# Patient Record
Sex: FEMALE | Race: WHITE | NOT HISPANIC OR LATINO | URBAN - METROPOLITAN AREA
[De-identification: names, ages, dates, MRNs, and addresses within clinical notes are randomized per-mention and may not be internally consistent; named-entity substitution may affect disease eponyms.]

---

## 2022-10-28 ENCOUNTER — APPOINTMENT (OUTPATIENT)
Dept: RADIOLOGY | Facility: MEDICAL CENTER | Age: 42
DRG: 964 | End: 2022-10-28
Attending: EMERGENCY MEDICINE
Payer: COMMERCIAL

## 2022-10-28 ENCOUNTER — APPOINTMENT (OUTPATIENT)
Dept: RADIOLOGY | Facility: MEDICAL CENTER | Age: 42
DRG: 964 | End: 2022-10-28
Attending: SURGERY
Payer: COMMERCIAL

## 2022-10-28 ENCOUNTER — HOSPITAL ENCOUNTER (INPATIENT)
Facility: MEDICAL CENTER | Age: 42
LOS: 5 days | DRG: 964 | End: 2022-11-02
Attending: EMERGENCY MEDICINE | Admitting: SURGERY
Payer: COMMERCIAL

## 2022-10-28 DIAGNOSIS — S06.5XAA SDH (SUBDURAL HEMATOMA) (HCC): ICD-10-CM

## 2022-10-28 DIAGNOSIS — S73.005A DISLOCATION OF LEFT HIP, INITIAL ENCOUNTER (HCC): ICD-10-CM

## 2022-10-28 DIAGNOSIS — T14.90XA TRAUMA: ICD-10-CM

## 2022-10-28 DIAGNOSIS — S22.43XA CLOSED FRACTURE OF MULTIPLE RIBS OF BOTH SIDES, INITIAL ENCOUNTER: ICD-10-CM

## 2022-10-28 DIAGNOSIS — S82.832A OTHER CLOSED FRACTURE OF DISTAL END OF LEFT FIBULA, INITIAL ENCOUNTER: ICD-10-CM

## 2022-10-28 DIAGNOSIS — S32.000A LUMBAR COMPRESSION FRACTURE, CLOSED, INITIAL ENCOUNTER (HCC): ICD-10-CM

## 2022-10-28 DIAGNOSIS — S12.9XXA FRACTURE OF CERVICAL SPINOUS PROCESS, INITIAL ENCOUNTER (HCC): ICD-10-CM

## 2022-10-28 PROBLEM — S06.0XAA CONCUSSION WITH UNKNOWN LOSS OF CONSCIOUSNESS STATUS: Status: ACTIVE | Noted: 2022-10-28

## 2022-10-28 PROBLEM — Z53.09 CONTRAINDICATION TO DEEP VEIN THROMBOSIS (DVT) PROPHYLAXIS: Status: ACTIVE | Noted: 2022-10-28

## 2022-10-28 PROBLEM — R91.8 ABNORMAL FINDING ON LUNG IMAGING: Status: ACTIVE | Noted: 2022-10-28

## 2022-10-28 PROBLEM — S02.2XXA NASAL SEPTUM FRACTURE, CLOSED, INITIAL ENCOUNTER: Status: ACTIVE | Noted: 2022-10-28

## 2022-10-28 LAB
ABO + RH BLD: NORMAL
ABO GROUP BLD: NORMAL
ALBUMIN SERPL BCP-MCNC: 4 G/DL (ref 3.2–4.9)
ALBUMIN/GLOB SERPL: 1.9 G/DL
ALP SERPL-CCNC: 48 U/L (ref 30–99)
ALT SERPL-CCNC: 82 U/L (ref 2–50)
ANION GAP SERPL CALC-SCNC: 13 MMOL/L (ref 7–16)
APTT PPP: 23.5 SEC (ref 24.7–36)
AST SERPL-CCNC: 99 U/L (ref 12–45)
BILIRUB SERPL-MCNC: 0.2 MG/DL (ref 0.1–1.5)
BLD GP AB SCN SERPL QL: NORMAL
BUN SERPL-MCNC: 17 MG/DL (ref 8–22)
CALCIUM SERPL-MCNC: 8.6 MG/DL (ref 8.5–10.5)
CHLORIDE SERPL-SCNC: 102 MMOL/L (ref 96–112)
CO2 SERPL-SCNC: 20 MMOL/L (ref 20–33)
CREAT SERPL-MCNC: 0.7 MG/DL (ref 0.5–1.4)
ERYTHROCYTE [DISTWIDTH] IN BLOOD BY AUTOMATED COUNT: 42.3 FL (ref 35.9–50)
ETHANOL BLD-MCNC: <10.1 MG/DL
GFR SERPLBLD CREATININE-BSD FMLA CKD-EPI: 110 ML/MIN/1.73 M 2
GLOBULIN SER CALC-MCNC: 2.1 G/DL (ref 1.9–3.5)
GLUCOSE SERPL-MCNC: 193 MG/DL (ref 65–99)
HCG SERPL QL: NEGATIVE
HCT VFR BLD AUTO: 42.1 % (ref 37–47)
HGB BLD-MCNC: 14.2 G/DL (ref 12–16)
INR PPP: 1.1 (ref 0.87–1.13)
MCH RBC QN AUTO: 32.7 PG (ref 27–33)
MCHC RBC AUTO-ENTMCNC: 33.7 G/DL (ref 33.6–35)
MCV RBC AUTO: 97 FL (ref 81.4–97.8)
PLATELET # BLD AUTO: 183 K/UL (ref 164–446)
PMV BLD AUTO: 9.7 FL (ref 9–12.9)
POTASSIUM SERPL-SCNC: 3.5 MMOL/L (ref 3.6–5.5)
PROT SERPL-MCNC: 6.1 G/DL (ref 6–8.2)
PROTHROMBIN TIME: 14.1 SEC (ref 12–14.6)
RBC # BLD AUTO: 4.34 M/UL (ref 4.2–5.4)
RH BLD: NORMAL
SODIUM SERPL-SCNC: 135 MMOL/L (ref 135–145)
WBC # BLD AUTO: 19.2 K/UL (ref 4.8–10.8)

## 2022-10-28 PROCEDURE — 90471 IMMUNIZATION ADMIN: CPT

## 2022-10-28 PROCEDURE — 85730 THROMBOPLASTIN TIME PARTIAL: CPT

## 2022-10-28 PROCEDURE — 770022 HCHG ROOM/CARE - ICU (200)

## 2022-10-28 PROCEDURE — 700102 HCHG RX REV CODE 250 W/ 637 OVERRIDE(OP): Performed by: SURGERY

## 2022-10-28 PROCEDURE — 86901 BLOOD TYPING SEROLOGIC RH(D): CPT

## 2022-10-28 PROCEDURE — 0SSBXZZ REPOSITION LEFT HIP JOINT, EXTERNAL APPROACH: ICD-10-PCS | Performed by: EMERGENCY MEDICINE

## 2022-10-28 PROCEDURE — 700105 HCHG RX REV CODE 258: Performed by: SURGERY

## 2022-10-28 PROCEDURE — 86900 BLOOD TYPING SEROLOGIC ABO: CPT

## 2022-10-28 PROCEDURE — 82077 ASSAY SPEC XCP UR&BREATH IA: CPT

## 2022-10-28 PROCEDURE — 72128 CT CHEST SPINE W/O DYE: CPT

## 2022-10-28 PROCEDURE — 700117 HCHG RX CONTRAST REV CODE 255: Performed by: EMERGENCY MEDICINE

## 2022-10-28 PROCEDURE — 96374 THER/PROPH/DIAG INJ IV PUSH: CPT

## 2022-10-28 PROCEDURE — 80053 COMPREHEN METABOLIC PANEL: CPT

## 2022-10-28 PROCEDURE — 72131 CT LUMBAR SPINE W/O DYE: CPT

## 2022-10-28 PROCEDURE — 51798 US URINE CAPACITY MEASURE: CPT

## 2022-10-28 PROCEDURE — 36415 COLL VENOUS BLD VENIPUNCTURE: CPT

## 2022-10-28 PROCEDURE — 70450 CT HEAD/BRAIN W/O DYE: CPT

## 2022-10-28 PROCEDURE — 700105 HCHG RX REV CODE 258: Performed by: EMERGENCY MEDICINE

## 2022-10-28 PROCEDURE — 3E0234Z INTRODUCTION OF SERUM, TOXOID AND VACCINE INTO MUSCLE, PERCUTANEOUS APPROACH: ICD-10-PCS | Performed by: EMERGENCY MEDICINE

## 2022-10-28 PROCEDURE — 700101 HCHG RX REV CODE 250: Performed by: SURGERY

## 2022-10-28 PROCEDURE — 73501 X-RAY EXAM HIP UNI 1 VIEW: CPT | Mod: LT

## 2022-10-28 PROCEDURE — 71045 X-RAY EXAM CHEST 1 VIEW: CPT

## 2022-10-28 PROCEDURE — 85610 PROTHROMBIN TIME: CPT

## 2022-10-28 PROCEDURE — 72170 X-RAY EXAM OF PELVIS: CPT

## 2022-10-28 PROCEDURE — 84703 CHORIONIC GONADOTROPIN ASSAY: CPT

## 2022-10-28 PROCEDURE — 96375 TX/PRO/DX INJ NEW DRUG ADDON: CPT

## 2022-10-28 PROCEDURE — 99233 SBSQ HOSP IP/OBS HIGH 50: CPT | Performed by: SURGERY

## 2022-10-28 PROCEDURE — 90715 TDAP VACCINE 7 YRS/> IM: CPT | Performed by: EMERGENCY MEDICINE

## 2022-10-28 PROCEDURE — 72125 CT NECK SPINE W/O DYE: CPT

## 2022-10-28 PROCEDURE — 700111 HCHG RX REV CODE 636 W/ 250 OVERRIDE (IP): Performed by: EMERGENCY MEDICINE

## 2022-10-28 PROCEDURE — A9270 NON-COVERED ITEM OR SERVICE: HCPCS | Performed by: SURGERY

## 2022-10-28 PROCEDURE — G0390 TRAUMA RESPONS W/HOSP CRITI: HCPCS

## 2022-10-28 PROCEDURE — 73030 X-RAY EXAM OF SHOULDER: CPT | Mod: RT

## 2022-10-28 PROCEDURE — 73610 X-RAY EXAM OF ANKLE: CPT | Mod: LT

## 2022-10-28 PROCEDURE — 99291 CRITICAL CARE FIRST HOUR: CPT

## 2022-10-28 PROCEDURE — 86850 RBC ANTIBODY SCREEN: CPT

## 2022-10-28 PROCEDURE — 71260 CT THORAX DX C+: CPT

## 2022-10-28 PROCEDURE — 85027 COMPLETE CBC AUTOMATED: CPT

## 2022-10-28 PROCEDURE — 27250 TREAT HIP DISLOCATION: CPT

## 2022-10-28 PROCEDURE — 99223 1ST HOSP IP/OBS HIGH 75: CPT | Performed by: SURGERY

## 2022-10-28 RX ORDER — ONDANSETRON 4 MG/1
4 TABLET, ORALLY DISINTEGRATING ORAL EVERY 4 HOURS PRN
Status: DISCONTINUED | OUTPATIENT
Start: 2022-10-28 | End: 2022-11-02 | Stop reason: HOSPADM

## 2022-10-28 RX ORDER — SODIUM CHLORIDE, SODIUM LACTATE, POTASSIUM CHLORIDE, CALCIUM CHLORIDE 600; 310; 30; 20 MG/100ML; MG/100ML; MG/100ML; MG/100ML
INJECTION, SOLUTION INTRAVENOUS CONTINUOUS
Status: DISCONTINUED | OUTPATIENT
Start: 2022-10-28 | End: 2022-10-30

## 2022-10-28 RX ORDER — DOCUSATE SODIUM 100 MG/1
100 CAPSULE, LIQUID FILLED ORAL 2 TIMES DAILY
Status: DISCONTINUED | OUTPATIENT
Start: 2022-10-28 | End: 2022-11-02 | Stop reason: HOSPADM

## 2022-10-28 RX ORDER — OXYCODONE HYDROCHLORIDE 10 MG/1
10 TABLET ORAL
Status: DISCONTINUED | OUTPATIENT
Start: 2022-10-28 | End: 2022-11-02 | Stop reason: HOSPADM

## 2022-10-28 RX ORDER — AMOXICILLIN 250 MG
1 CAPSULE ORAL NIGHTLY
Status: DISCONTINUED | OUTPATIENT
Start: 2022-10-28 | End: 2022-11-02 | Stop reason: HOSPADM

## 2022-10-28 RX ORDER — OXYCODONE HYDROCHLORIDE 5 MG/1
5 TABLET ORAL
Status: DISCONTINUED | OUTPATIENT
Start: 2022-10-28 | End: 2022-11-02 | Stop reason: HOSPADM

## 2022-10-28 RX ORDER — POLYETHYLENE GLYCOL 3350 17 G/17G
1 POWDER, FOR SOLUTION ORAL 2 TIMES DAILY
Status: DISCONTINUED | OUTPATIENT
Start: 2022-10-28 | End: 2022-11-02 | Stop reason: HOSPADM

## 2022-10-28 RX ORDER — LIDOCAINE 50 MG/G
1 PATCH TOPICAL EVERY 24 HOURS
Status: DISCONTINUED | OUTPATIENT
Start: 2022-10-28 | End: 2022-10-30

## 2022-10-28 RX ORDER — METAXALONE 800 MG/1
800 TABLET ORAL 3 TIMES DAILY
Status: DISCONTINUED | OUTPATIENT
Start: 2022-10-28 | End: 2022-11-02 | Stop reason: HOSPADM

## 2022-10-28 RX ORDER — ONDANSETRON 2 MG/ML
4 INJECTION INTRAMUSCULAR; INTRAVENOUS ONCE
Status: COMPLETED | OUTPATIENT
Start: 2022-10-28 | End: 2022-10-28

## 2022-10-28 RX ORDER — ACETAMINOPHEN 325 MG/1
650 TABLET ORAL 4 TIMES DAILY
Status: DISCONTINUED | OUTPATIENT
Start: 2022-10-28 | End: 2022-11-02 | Stop reason: HOSPADM

## 2022-10-28 RX ORDER — MODAFINIL 200 MG/1
200 TABLET ORAL
Status: ON HOLD | COMMUNITY
End: 2022-11-02

## 2022-10-28 RX ORDER — AMOXICILLIN 250 MG
1 CAPSULE ORAL
Status: DISCONTINUED | OUTPATIENT
Start: 2022-10-28 | End: 2022-11-02 | Stop reason: HOSPADM

## 2022-10-28 RX ORDER — BISACODYL 10 MG
10 SUPPOSITORY, RECTAL RECTAL
Status: DISCONTINUED | OUTPATIENT
Start: 2022-10-28 | End: 2022-11-02 | Stop reason: HOSPADM

## 2022-10-28 RX ORDER — GABAPENTIN 100 MG/1
100 CAPSULE ORAL EVERY 8 HOURS
Status: DISCONTINUED | OUTPATIENT
Start: 2022-10-28 | End: 2022-10-30

## 2022-10-28 RX ORDER — SODIUM CHLORIDE 9 MG/ML
1000 INJECTION, SOLUTION INTRAVENOUS ONCE
Status: COMPLETED | OUTPATIENT
Start: 2022-10-28 | End: 2022-10-28

## 2022-10-28 RX ORDER — NAPROXEN SODIUM 220 MG
440 TABLET ORAL
Status: ON HOLD | COMMUNITY
End: 2022-11-02

## 2022-10-28 RX ORDER — ENEMA 19; 7 G/133ML; G/133ML
1 ENEMA RECTAL
Status: DISCONTINUED | OUTPATIENT
Start: 2022-10-28 | End: 2022-11-02 | Stop reason: HOSPADM

## 2022-10-28 RX ORDER — MORPHINE SULFATE 4 MG/ML
4 INJECTION INTRAVENOUS
Status: DISCONTINUED | OUTPATIENT
Start: 2022-10-28 | End: 2022-10-28

## 2022-10-28 RX ORDER — HYDROMORPHONE HYDROCHLORIDE 1 MG/ML
0.5 INJECTION, SOLUTION INTRAMUSCULAR; INTRAVENOUS; SUBCUTANEOUS
Status: DISCONTINUED | OUTPATIENT
Start: 2022-10-28 | End: 2022-10-30

## 2022-10-28 RX ORDER — ENOXAPARIN SODIUM 100 MG/ML
30 INJECTION SUBCUTANEOUS EVERY 12 HOURS
Status: DISCONTINUED | OUTPATIENT
Start: 2022-10-29 | End: 2022-11-02 | Stop reason: HOSPADM

## 2022-10-28 RX ORDER — ONDANSETRON 2 MG/ML
4 INJECTION INTRAMUSCULAR; INTRAVENOUS EVERY 4 HOURS PRN
Status: DISCONTINUED | OUTPATIENT
Start: 2022-10-28 | End: 2022-10-30

## 2022-10-28 RX ADMIN — CLOSTRIDIUM TETANI TOXOID ANTIGEN (FORMALDEHYDE INACTIVATED), CORYNEBACTERIUM DIPHTHERIAE TOXOID ANTIGEN (FORMALDEHYDE INACTIVATED), BORDETELLA PERTUSSIS TOXOID ANTIGEN (GLUTARALDEHYDE INACTIVATED), BORDETELLA PERTUSSIS FILAMENTOUS HEMAGGLUTININ ANTIGEN (FORMALDEHYDE INACTIVATED), BORDETELLA PERTUSSIS PERTACTIN ANTIGEN, AND BORDETELLA PERTUSSIS FIMBRIAE 2/3 ANTIGEN 0.5 ML: 5; 2; 2.5; 5; 3; 5 INJECTION, SUSPENSION INTRAMUSCULAR at 06:28

## 2022-10-28 RX ADMIN — METAXALONE 800 MG: 800 TABLET ORAL at 17:04

## 2022-10-28 RX ADMIN — ONDANSETRON 4 MG: 2 INJECTION INTRAMUSCULAR; INTRAVENOUS at 05:45

## 2022-10-28 RX ADMIN — OXYCODONE HYDROCHLORIDE 10 MG: 10 TABLET ORAL at 23:57

## 2022-10-28 RX ADMIN — OXYCODONE 5 MG: 5 TABLET ORAL at 10:15

## 2022-10-28 RX ADMIN — ACETAMINOPHEN 650 MG: 325 TABLET, FILM COATED ORAL at 21:04

## 2022-10-28 RX ADMIN — SENNOSIDES AND DOCUSATE SODIUM 1 TABLET: 50; 8.6 TABLET ORAL at 21:04

## 2022-10-28 RX ADMIN — OXYCODONE HYDROCHLORIDE 10 MG: 10 TABLET ORAL at 15:26

## 2022-10-28 RX ADMIN — ACETAMINOPHEN 650 MG: 325 TABLET, FILM COATED ORAL at 12:36

## 2022-10-28 RX ADMIN — PROPOFOL 50 MG: 10 INJECTION, EMULSION INTRAVENOUS at 05:00

## 2022-10-28 RX ADMIN — SODIUM CHLORIDE 1000 ML: 9 INJECTION, SOLUTION INTRAVENOUS at 06:00

## 2022-10-28 RX ADMIN — IOHEXOL 100 ML: 350 INJECTION, SOLUTION INTRAVENOUS at 05:45

## 2022-10-28 RX ADMIN — GABAPENTIN 100 MG: 100 CAPSULE ORAL at 08:38

## 2022-10-28 RX ADMIN — GABAPENTIN 100 MG: 100 CAPSULE ORAL at 13:36

## 2022-10-28 RX ADMIN — SODIUM CHLORIDE, POTASSIUM CHLORIDE, SODIUM LACTATE AND CALCIUM CHLORIDE: 600; 310; 30; 20 INJECTION, SOLUTION INTRAVENOUS at 18:58

## 2022-10-28 RX ADMIN — MORPHINE SULFATE 4 MG: 4 INJECTION, SOLUTION INTRAMUSCULAR; INTRAVENOUS at 05:32

## 2022-10-28 RX ADMIN — METAXALONE 800 MG: 800 TABLET ORAL at 08:38

## 2022-10-28 RX ADMIN — METAXALONE 800 MG: 800 TABLET ORAL at 12:36

## 2022-10-28 RX ADMIN — ACETAMINOPHEN 650 MG: 325 TABLET, FILM COATED ORAL at 08:38

## 2022-10-28 RX ADMIN — LIDOCAINE 1 PATCH: 50 PATCH TOPICAL at 08:38

## 2022-10-28 RX ADMIN — ACETAMINOPHEN 650 MG: 325 TABLET, FILM COATED ORAL at 17:04

## 2022-10-28 RX ADMIN — SODIUM CHLORIDE, POTASSIUM CHLORIDE, SODIUM LACTATE AND CALCIUM CHLORIDE: 600; 310; 30; 20 INJECTION, SOLUTION INTRAVENOUS at 08:40

## 2022-10-28 RX ADMIN — GABAPENTIN 100 MG: 100 CAPSULE ORAL at 21:04

## 2022-10-28 ASSESSMENT — COGNITIVE AND FUNCTIONAL STATUS - GENERAL
DRESSING REGULAR LOWER BODY CLOTHING: A LITTLE
PERSONAL GROOMING: A LITTLE
EATING MEALS: A LITTLE
CLIMB 3 TO 5 STEPS WITH RAILING: A LITTLE
SUGGESTED CMS G CODE MODIFIER DAILY ACTIVITY: CK
SUGGESTED CMS G CODE MODIFIER MOBILITY: CK
MOVING FROM LYING ON BACK TO SITTING ON SIDE OF FLAT BED: A LITTLE
WALKING IN HOSPITAL ROOM: A LITTLE
MOVING TO AND FROM BED TO CHAIR: A LITTLE
STANDING UP FROM CHAIR USING ARMS: A LITTLE
TURNING FROM BACK TO SIDE WHILE IN FLAT BAD: A LITTLE
DRESSING REGULAR UPPER BODY CLOTHING: A LITTLE
MOBILITY SCORE: 18
HELP NEEDED FOR BATHING: A LITTLE
TOILETING: A LITTLE
DAILY ACTIVITIY SCORE: 18

## 2022-10-28 ASSESSMENT — LIFESTYLE VARIABLES
AVERAGE NUMBER OF DAYS PER WEEK YOU HAVE A DRINK CONTAINING ALCOHOL: 1
EVER HAD A DRINK FIRST THING IN THE MORNING TO STEADY YOUR NERVES TO GET RID OF A HANGOVER: NO
HAVE PEOPLE ANNOYED YOU BY CRITICIZING YOUR DRINKING: NO
DOES PATIENT WANT TO STOP DRINKING: NO
HAVE YOU EVER FELT YOU SHOULD CUT DOWN ON YOUR DRINKING: NO
CONSUMPTION TOTAL: NEGATIVE
ON A TYPICAL DAY WHEN YOU DRINK ALCOHOL HOW MANY DRINKS DO YOU HAVE: 1
HOW MANY TIMES IN THE PAST YEAR HAVE YOU HAD 5 OR MORE DRINKS IN A DAY: 0
TOTAL SCORE: 0
ALCOHOL_USE: YES
TOTAL SCORE: 0
TOTAL SCORE: 0
EVER FELT BAD OR GUILTY ABOUT YOUR DRINKING: NO

## 2022-10-28 ASSESSMENT — ENCOUNTER SYMPTOMS
BACK PAIN: 0
PALPITATIONS: 0
NECK PAIN: 1
MYALGIAS: 1
ABDOMINAL PAIN: 0
SPEECH CHANGE: 0
DIZZINESS: 0
TINGLING: 0
FEVER: 0
CHILLS: 0
SHORTNESS OF BREATH: 0
COUGH: 0
BLURRED VISION: 0
DOUBLE VISION: 0
SENSORY CHANGE: 0
HEADACHES: 0
JOINT SWELLING: 1
TREMORS: 0
NAUSEA: 0
VOMITING: 0

## 2022-10-28 ASSESSMENT — PAIN DESCRIPTION - PAIN TYPE
TYPE: ACUTE PAIN

## 2022-10-28 ASSESSMENT — PATIENT HEALTH QUESTIONNAIRE - PHQ9
2. FEELING DOWN, DEPRESSED, IRRITABLE, OR HOPELESS: NOT AT ALL
SUM OF ALL RESPONSES TO PHQ9 QUESTIONS 1 AND 2: 0
1. LITTLE INTEREST OR PLEASURE IN DOING THINGS: NOT AT ALL

## 2022-10-28 NOTE — ED NOTES
Pt transported to [facility] from CT, Pt placed on cardiac monitor, continuous pulse ox and BP. Call light in reach, side rails up, bed locked and in lowest position, multiple warm blanket provided. C/o chills, answering questions appropriately, c/o pain to R shoulder, lower back, R hip.

## 2022-10-28 NOTE — ASSESSMENT & PLAN NOTE
3.3 mm left posterior subdural hematoma.  mBIG1.  Repeat interval CT imaging brain demonstrated no significant change or progression.  Non-operative management.  Post traumatic pharmacologic seizure prophylaxis for 1 week.  10/31 Speech Language Pathology cognitive evaluation completed, no further needs.  Josias Schreiber MD. Neurosurgeon. [facility].
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
## 2022-10-28 NOTE — ASSESSMENT & PLAN NOTE
Left L4 superior facet fracture. Fracture through superolateral L4 superior endplate. Right T12, L1, L2, and L3 transverse process fractures.  10/29 MR completed.  Non-operative management.  Mobilize as tolerated with bracing. LSO, may don/doff at edge of bed.  Josias Schreiber MD. Neurosurgeon. [facility].
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
## 2022-10-28 NOTE — PROGRESS NOTES
ORTHO RESPONSE TIME:  I was contacted by Dr. Castillo at 6:08am requesting a formal orthopaedic consultation.  I responded to the consultation request at 6:10am.  I physically evaluated the patient at 9:10am.    Ministerio Watts M.D.

## 2022-10-28 NOTE — LETTER
"  FORM C-4:  EMPLOYEE’S CLAIM FOR COMPENSATION/ REPORT OF INITIAL TREATMENT  EMPLOYEE’S CLAIM - PROVIDE ALL INFORMATION REQUESTED   First Name Nerissa Last Name Sonny Birthdate 1980  Sex female Claim Number   Home Address [address]             [place] [address]                                   Age  42 y.o. Height  1.676 m (5' 6\") Weight  72.5 kg (159 lb 13.3 oz) [facility]     Mailing Address [address]              Zip [address] Telephone  [phone] (home)  Primary Language Spoken  ENGLISH   Insurer   Third Party   [employer] Employee's Occupation (Job Title) When Injury or Occupational Disease Occurred     Employer's Name [employer] Telephone [phone]    Employer Address [address]] Zip [address]   Date of Injury  10/27/2022       Hour of Injury  11:00 PM Date Employer Notified  10/27/2022 Last Day of Work after Injury or Occupational Disease  10/27/2022 Supervisor to Whom Injury Reported  Edson Gutierrez   Address or Location of Accident (if applicable) Work [1]   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 Be specific and answer in detail. Use additional sheet if necessary)  Rock fell on us   If you believe that you have an occupational disease, when did you first have knowledge of the disability and it relationship to your employment? N/A Witnesses to the Accident  John   Nature of Injury or Occupational Disease  Workers' Compensation Part(s) of Body Injured or Affected  Soft Tissue, N/A, N/A    I CERTIFY THAT THE ABOVE IS TRUE AND CORRECT TO THE BEST OF MY KNOWLEDGE AND THAT I HAVE PROVIDED THIS INFORMATION IN ORDER TO OBTAIN THE BENEFITS OF [place]  I HEREBY AUTHORIZE ANY PHYSICIAN, " CHIROPRACTOR, SURGEON, PRACTITIONER, OR OTHER PERSON, ANY HOSPITAL, INCLUDING [facility] OR [facility], ANY MEDICAL SERVICE ORGANIZATION, ANY INSURANCE COMPANY, OR OTHER INSTITUTION OR ORGANIZATION TO RELEASE TO EACH OTHER, ANY MEDICAL OR OTHER INFORMATION, INCLUDING BENEFITS PAID OR PAYABLE, PERTINENT TO THIS INJURY OR DISEASE, EXCEPT INFORMATION RELATIVE TO DIAGNOSIS, TREATMENT AND/OR COUNSELING FOR AIDS, PSYCHOLOGICAL CONDITIONS, ALCOHOL OR CONTROLLED SUBSTANCES, FOR WHICH I MUST GIVE SPECIFIC AUTHORIZATION.  A PHOTOSTAT OF THIS AUTHORIZATION SHALL BE AS VALID AS THE ORIGINAL.  Date                                      Place   [facility]                                    Employee’s Signature   THIS REPORT MUST BE COMPLETED AND MAILED WITHIN 3 WORKING DAYS OF TREATMENT   Place ORTHO/SPINE                       Name of Facility [facility]   Date  10/28/2022 Diagnosis  (S06.5XAA) SDH (subdural hematoma)  (S73.005A) Dislocation of left hip, initial encounter ([facility])  (S22.43XA) Closed fracture of multiple ribs of both sides, initial encounter  (S12.9XXA) Fracture of cervical spinous process, initial encounter ([facility])  (S32.000A) Lumbar compression fracture, closed, initial encounter ([facility])  (S82.832A) Other closed fracture of distal end of left fibula, initial encounter Is there evidence the injured employee was under the influence of alcohol and/or another controlled substance at the time of accident?   Hour  4:54 PM Description of Injury or Disease  SDH (subdural hematoma)  Dislocation of left hip, initial encounter ([facility])  Closed fracture of multiple ribs of both sides, initial encounter  Fracture of cervical spinous process, initial encounter ([facility])  Lumbar compression fracture, closed, initial encounter ([facility])  Other closed fracture of distal end of left fibula, initial encounter No   Treatment  Hip dislocation reduction, admission to the hospital  Have you advised the patient to  "remain off work five days or more?         No   X-Ray Findings  Positive If Yes   From Date    To Date      From information given by the employee, together with medical evidence, can you directly connect this injury or occupational disease as job incurred? Yes If No, is employee capable of: Full Duty  No Modified Duty  Yes   Is additional medical care by a physician indicated? Yes If Modified Duty, Specify any Limitations / Restrictions   Per further recommendations of admitting team   Do you know of any previous injury or disease contributing to this condition or occupational disease? No    Date 10/31/2022 Print Doctor’s Name Bob Castillo certify the employer’s copy of this form was mailed on:   Address [address] [place] [id number] [phone] INSURER’S USE ONLY   Provider’s Tax ID Number [id number] Telephone Dept: [phone]    Doctor’s Signature e-SignLAUREL CAMRAGO M.D. Degree     M.D.      Form C-4 (rev.10/07)                                                                         BRIEF DESCRIPTION OF RIGHTS AND BENEFITS  (Pursuant to [place] 616C.050)    Notice of Injury or Occupational Disease (Incident Report Form C-1): If an injury or occupational disease (OD) arises out of and in the course of employment, you must provide written notice to your employer as soon as practicable, but no later than 7 days after the accident or OD. Your employer shall maintain a sufficient supply of the required forms.    Claim for Compensation (Form C-4): If medical treatment is sought, the form C-4 is available at the place of initial treatment. A completed \"Claim for Compensation\" (Form C-4) must be filed within 90 days after an accident or OD. The treating physician or chiropractor must, within 3 working days after treatment, complete and mail to the employer, the employer's insurer and third-party , the Claim for Compensation.    Medical Treatment: If you require medical treatment for your " on-the-job injury or OD, you may be required to select a physician or chiropractor from a list provided by your workers’ compensation insurer, if it has contracted with an Organization for Managed Care (MCO) or Preferred Provider Organization (PPO) or providers of health care. If your employer has not entered into a contract with an MCO or PPO, you may select a physician or chiropractor from the Panel of Physicians and Chiropractors. Any medical costs related to your industrial injury or OD will be paid by your insurer.    Temporary Total Disability (TTD): If your doctor has certified that you are unable to work for a period of at least 5 consecutive days, or 5 cumulative days in a 20-day period, or places restrictions on you that your employer does not accommodate, you may be entitled to TTD compensation.    Temporary Partial Disability (TPD): If the wage you receive upon reemployment is less than the compensation for TTD to which you are entitled, the insurer may be required to pay you TPD compensation to make up the difference. TPD can only be paid for a maximum of 24 months.    Permanent Partial Disability (PPD): When your medical condition is stable and there is an indication of a PPD as a result of your injury or OD, within 30 days, your insurer must arrange for an evaluation by a rating physician or chiropractor to determine the degree of your PPD. The amount of your PPD award depends on the date of injury, the results of the PPD evaluation, your age and wage.    Permanent Total Disability (PTD): If you are medically certified by a treating physician or chiropractor as permanently and totally disabled and have been granted a PTD status by your insurer, you are entitled to receive monthly benefits not to exceed 66 2/3% of your average monthly wage. The amount of your PTD payments is subject to reduction if you previously received a lump-sum PPD award.    Vocational Rehabilitation Services: You may be eligible  for vocational rehabilitation services if you are unable to return to the job due to a permanent physical impairment or permanent restrictions as a result of your injury or occupational disease.    Transportation and Per Michaela Reimbursement: You may be eligible for travel expenses and per michaela associated with medical treatment.    Reopening: You may be able to reopen your claim if your condition worsens after claim closure.     Appeal Process: If you disagree with a written determination issued by the insurer or the insurer does not respond to your request, you may appeal to the Department of Administration, , by following the instructions contained in your determination letter. You must appeal the determination within 70 days from the date of the determination letter at [address], [address], [place], [place] [address], or [address], [place], [place] [address]. If you disagree with the  decision, you may appeal to the Department of Administration, . You must file your appeal within 30 days from the date of the  decision letter at [address], [address], [place], [place] [address], or [address], [address], [place], [place] [address]. If you disagree with a decision of an , you may file a petition for judicial review with the District Court. You must do so within 30 days of the Appeal Officer’s decision. You may be represented by an  at your own expense or you may contact the [facility] for possible representation.    [place]  for Injured Workers (NAIW): If you disagree with a  decision, you may request that NAIW represent you without charge at an  Hearing. For information regarding denial of benefits, you may contact the [facility] at: [address], [address], [place] [address], [phone], or [address], [address], [place] [address],  [phone]    To File a Complaint with the Division: If you wish to file a complaint with the  of the Division of Industrial Relations (DIR),  please contact the Workers’ Compensation Section, [address], [address], [place], [place] [address], telephone [phone], or [address], [address], [place], [place] [address], telephone [phone].    For assistance with Workers’ Compensation Issues: You may contact the [facility] Office for Consumer Health Assistance, [address], [address], [place], [place] [address], Toll Free 1-119.258.5426, Web site: http://[facility].[place].gov/Programs/MICHELLE E-mail: michelle@[facility].nv.gov  D-2 (rev. 10/20)              __________________________________________________________________                                    _________________            Employee Name / Signature                                                                                                                            Date

## 2022-10-28 NOTE — CARE PLAN
The patient is Watcher - Medium risk of patient condition declining or worsening    Shift Goals  Clinical Goals: Q2 neuro, pain control  Patient Goals: Comfort  Family Goals: Comfort    Progress made toward(s) clinical / shift goals:    Problem: Pain - Standard  Goal: Alleviation of pain or a reduction in pain to the patient’s comfort goal  Outcome: Progressing     Problem: Skin Integrity  Goal: Skin integrity is maintained or improved  Outcome: Progressing       Patient is not progressing towards the following goals:

## 2022-10-28 NOTE — ASSESSMENT & PLAN NOTE
Left distal fibula fracture.  Non-operative management.  Weight bearing status - Weightbearing as tolerated LLE in air splint.  Follow up 2-4 weeks.  Ministerio Watts MD. Orthopedic Surgeon. [facility].
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
## 2022-10-28 NOTE — PROGRESS NOTES
"      Mental status adequate for full examination?: Yes    Spine cleared (radiologically and/or clinically): No    REVIEW OF SYSTEMS:  Review of Systems   Constitutional:  Negative for chills and fever.   Eyes:  Negative for blurred vision and double vision.   Respiratory:  Negative for cough and shortness of breath.    Cardiovascular:  Negative for palpitations.   Gastrointestinal:  Negative for abdominal pain, nausea and vomiting.   Musculoskeletal:  Positive for joint pain, myalgias and neck pain. Negative for back pain.   Neurological:  Negative for dizziness, tingling, tremors, sensory change, speech change and headaches.     PHYSICAL EXAMINATION:  Blood Pressure 100/63   Pulse 71   Temperature 36.4 °C (97.5 °F) (Temporal)   Respiration 15   Height 1.676 m (5' 6\")   Weight 63.5 kg (140 lb)   Oxygen Saturation 95%   Body Mass Index 22.60 kg/m²   Physical Exam  Vitals and nursing note reviewed.   Constitutional:       Appearance: She is not toxic-appearing.   HENT:      Head:      Comments: Right sided facial abrasions     Nose:      Comments: Nasal abrasions     Mouth/Throat:      Mouth: Mucous membranes are moist.      Pharynx: Oropharynx is clear.   Eyes:      Conjunctiva/sclera: Conjunctivae normal.   Cardiovascular:      Rate and Rhythm: Normal rate and regular rhythm.      Pulses: Normal pulses.   Pulmonary:      Effort: Pulmonary effort is normal. No respiratory distress.   Chest:      Chest wall: Tenderness present.   Abdominal:      General: There is no distension.      Palpations: Abdomen is soft.      Tenderness: There is no abdominal tenderness. There is no guarding.   Musculoskeletal:         General: Tenderness present.      Cervical back: No tenderness.      Comments: Left hip and ankle tenderness   Skin:     General: Skin is warm and dry.      Capillary Refill: Capillary refill takes less than 2 seconds.   Neurological:      Mental Status: She is alert and oriented to person, place, and " time.       LABORATORY VALUES:  Recent Labs     10/28/22  0451   WBC 19.2*   RBC 4.34   HEMOGLOBIN 14.2   HEMATOCRIT 42.1   MCV 97.0   MCH 32.7   MCHC 33.7   RDW 42.3   PLATELETCT 183   MPV 9.7     Recent Labs     10/28/22  0451   SODIUM 135   POTASSIUM 3.5*   CHLORIDE 102   CO2 20   GLUCOSE 193*   BUN 17   CREATININE 0.70   CALCIUM 8.6     Recent Labs     10/28/22  0451 10/28/22  0712   ASTSGOT 99*  --    ALTSGPT 82*  --    TBILIRUBIN 0.2  --    ALKPHOSPHAT 48  --    GLOBULIN 2.1  --    INR  --  1.10     Recent Labs     10/28/22  0712   APTT 23.5*   INR 1.10       IMAGING:  DX-ANKLE 3+ VIEWS LEFT   Final Result      Fracture of the distal fibula      CT-TSPINE W/O PLUS RECONS   Final Result         1.  Right T12, L1, and L2 transverse process fractures      CT-LSPINE W/O PLUS RECONS   Final Result         1.  Left L4 superior facet fracture   2.  Fracture through the superolateral L4 superior endplate   3.  Right T12, L1, L2, and L3 transverse process fractures.      CT-CHEST,ABDOMEN,PELVIS WITH   Final Result         1.  Posterior left 10th and 11th rib fractures. Right posterior third rib fracture is seen.   2.  Right posterior 12th rib fracture at the costovertebral junction   3.  Thoracic and lumbar spinal fractures, see dedicated spinal CT for further characterization.   4.  Pulmonary nodules, see nodule follow-up recommendations below.      Fleischner Society pulmonary nodule recommendations:   Low Risk: No routine follow-up      High Risk: Optional CT at 12 months      Comments: Use most suspicious nodule as guide to management. Follow-up intervals may vary according to size and risk.      Low Risk - Minimal or absent history of smoking and of other known risk factors.      High Risk - History of smoking or of other known risk factors.      Note: These recommendations do not apply to lung cancer screening, patients with immunosuppression, or patients with known primary cancer.      Fleischner Society 2017  Guidelines for Management of Incidentally Detected Pulmonary Nodules in Adults      CT-HEAD W/O   Final Result         1.  3.3 mm left posterior subdural hematoma.   2.  Bilateral nasal bone fractures      Based solely on CT findings, the brain injury guideline category is mBIG 1.      SDH < 4mm   IPH < 4mm   SAH < 3 sulci and < 1mm      The original BIG retrospective analysis found radiographic progression in 0% of BIG 1 patients and 2.6% BIG 2.      These findings were discussed with the patient's clinician, Bob Castillo, on 10/28/2022 5:55 AM.      CT-CSPINE WITHOUT PLUS RECONS   Final Result         1.  C6 spinous process fracture      DX-HIP-UNILATERAL-WITHOUT PELVIS-1 VIEW LEFT   Final Result         1.  No radiographic evidence of acute traumatic injury.      DX-SHOULDER 2+ RIGHT   Final Result         1.  No acute traumatic bony injury. Examination limited due to overlying foreign bodies         DX-PELVIS-1 OR 2 VIEWS   Final Result         1.  Left hip dislocation.      DX-CHEST-LIMITED (1 VIEW)   Final Result         1.  No acute cardiopulmonary disease.   2.  Evaluation of the lungs is limited due to overlying materials.      CT-HEAD W/O    (Results Pending)   MR-CERVICAL SPINE-WITH & W/O    (Results Pending)   MR-LUMBAR SPINE-WITH & W/O    (Results Pending)       All current laboratory studies/radiology exams reviewed: Yes    Medications reconciliation has been reviewed: Yes    Completed Consultations:  Dr. Schreiber, neurosurgery  Dr. Watts, orthopedic surgery    Pending Consultations:  None    Newly Identified Diagnoses and Injuries:  None noted at time of exam    RAP Score Total: 6    CAGE Results: not completed Blood Alcohol>0.08: no     Discussed patient condition with Family, RN, Patient, and orthopedics and trauma surgery, Dr. Wall and Dr. Watts.

## 2022-10-28 NOTE — LETTER
"  FORM C-4:  EMPLOYEE’S CLAIM FOR COMPENSATION/ REPORT OF INITIAL TREATMENT  EMPLOYEE’S CLAIM - PROVIDE ALL INFORMATION REQUESTED   First Name Butter Last Name Eighty-One Birthdate 1980  Sex female Claim Number   Home Address [address]             [place] [address]                                   Age  42 y.o. Height  1.676 m (5' 6\") Weight  63.5 kg (140 lb) [facility]  [id number]   Mailing Address [address]              Zip [address] Telephone  [phone] (home)  Primary Language Spoken   Insurer  *** Third Party   MISC WORKERS COMP Employee's Occupation (Job Title) When Injury or Occupational Disease Occurred     Employer's Name  Telephone [phone]    Employer Address [address]] Zip [address]   Date of Injury  10/27/2022       Hour of Injury  11:00 PM Date Employer Notified  10/27/2022 Last Day of Work after Injury or Occupational Disease  10/27/2022 Supervisor to Whom Injury Reported  Edson Gutierrez   Address or Location of Accident (if applicable) Work [1]   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 Be specific and answer in detail. Use additional sheet if necessary)  Rock fell on us   If you believe that you have an occupational disease, when did you first have knowledge of the disability and it relationship to your employment? N/A Witnesses to the Accident  John   Nature of Injury or Occupational Disease  Workers' Compensation Part(s) of Body Injured or Affected  Soft Tissue, N/A, N/A    I CERTIFY THAT THE ABOVE IS TRUE AND CORRECT TO THE BEST OF MY KNOWLEDGE AND THAT I HAVE PROVIDED THIS INFORMATION IN ORDER TO OBTAIN THE BENEFITS OF [place]  I HEREBY AUTHORIZE ANY PHYSICIAN, CHIROPRACTOR, " SURGEON, PRACTITIONER, OR OTHER PERSON, ANY HOSPITAL, INCLUDING [facility] OR [facility], ANY MEDICAL SERVICE ORGANIZATION, ANY INSURANCE COMPANY, OR OTHER INSTITUTION OR ORGANIZATION TO RELEASE TO EACH OTHER, ANY MEDICAL OR OTHER INFORMATION, INCLUDING BENEFITS PAID OR PAYABLE, PERTINENT TO THIS INJURY OR DISEASE, EXCEPT INFORMATION RELATIVE TO DIAGNOSIS, TREATMENT AND/OR COUNSELING FOR AIDS, PSYCHOLOGICAL CONDITIONS, ALCOHOL OR CONTROLLED SUBSTANCES, FOR WHICH I MUST GIVE SPECIFIC AUTHORIZATION.  A PHOTOSTAT OF THIS AUTHORIZATION SHALL BE AS VALID AS THE ORIGINAL.  Date                                      Place                                                                             Employee’s Signature   THIS REPORT MUST BE COMPLETED AND MAILED WITHIN 3 WORKING DAYS OF TREATMENT   Place [facility], EMERGENCY DEPT                       Name of Facility [facility]   Date  10/21/2022 Diagnosis  (S06.5XAA) SDH (subdural hematoma)  (S73.005A) Dislocation of left hip, initial encounter ([facility]) Is there evidence the injured employee was under the influence of alcohol and/or another controlled substance at the time of accident?   Hour  6:39 AM Description of Injury or Disease  SDH (subdural hematoma)  Dislocation of left hip, initial encounter ([facility])     Treatment     Have you advised the patient to remain off work five days or more?             X-Ray Findings    If Yes   From Date    To Date      From information given by the employee, together with medical evidence, can you directly connect this injury or occupational disease as job incurred?   If No, is employee capable of: Full Duty    Modified Duty      Is additional medical care by a physician indicated?   If Modified Duty, Specify any Limitations / Restrictions       Do you know of any previous injury or disease contributing to this condition or occupational disease?      Date 10/28/2022 Print Doctor’s  "Name Bob Castillo GISSELLE certify the employer’s copy of this form was mailed on:   Address [address] [place] [address] [phone] INSURER’S USE ONLY   Provider’s Tax ID Number [id number] Telephone Dept: [phone]    Doctor’s Signature   Degree        Form C-4 (rev.10/07)                                                                         BRIEF DESCRIPTION OF RIGHTS AND BENEFITS  (Pursuant to [place] 616C.050)    Notice of Injury or Occupational Disease (Incident Report Form C-1): If an injury or occupational disease (OD) arises out of and in the course of employment, you must provide written notice to your employer as soon as practicable, but no later than 7 days after the accident or OD. Your employer shall maintain a sufficient supply of the required forms.    Claim for Compensation (Form C-4): If medical treatment is sought, the form C-4 is available at the place of initial treatment. A completed \"Claim for Compensation\" (Form C-4) must be filed within 90 days after an accident or OD. The treating physician or chiropractor must, within 3 working days after treatment, complete and mail to the employer, the employer's insurer and third-party , the Claim for Compensation.    Medical Treatment: If you require medical treatment for your on-the-job injury or OD, you may be required to select a physician or chiropractor from a list provided by your workers’ compensation insurer, if it has contracted with an Organization for Managed Care (MCO) or Preferred Provider Organization (PPO) or providers of health care. If your employer has not entered into a contract with an MCO or PPO, you may select a physician or chiropractor from the Panel of Physicians and Chiropractors. Any medical costs related to your industrial injury or OD will be paid by your insurer.    Temporary Total Disability (TTD): If your doctor has certified that you are unable to work for a period of at least 5 consecutive days, or " 5 cumulative days in a 20-day period, or places restrictions on you that your employer does not accommodate, you may be entitled to TTD compensation.    Temporary Partial Disability (TPD): If the wage you receive upon reemployment is less than the compensation for TTD to which you are entitled, the insurer may be required to pay you TPD compensation to make up the difference. TPD can only be paid for a maximum of 24 months.    Permanent Partial Disability (PPD): When your medical condition is stable and there is an indication of a PPD as a result of your injury or OD, within 30 days, your insurer must arrange for an evaluation by a rating physician or chiropractor to determine the degree of your PPD. The amount of your PPD award depends on the date of injury, the results of the PPD evaluation, your age and wage.    Permanent Total Disability (PTD): If you are medically certified by a treating physician or chiropractor as permanently and totally disabled and have been granted a PTD status by your insurer, you are entitled to receive monthly benefits not to exceed 66 2/3% of your average monthly wage. The amount of your PTD payments is subject to reduction if you previously received a lump-sum PPD award.    Vocational Rehabilitation Services: You may be eligible for vocational rehabilitation services if you are unable to return to the job due to a permanent physical impairment or permanent restrictions as a result of your injury or occupational disease.    Transportation and Per Michaela Reimbursement: You may be eligible for travel expenses and per michaela associated with medical treatment.    Reopening: You may be able to reopen your claim if your condition worsens after claim closure.     Appeal Process: If you disagree with a written determination issued by the insurer or the insurer does not respond to your request, you may appeal to the Department of Administration, , by following the instructions  contained in your determination letter. You must appeal the determination within 70 days from the date of the determination letter at [address], [address], [place], [place] [address], or [address], [address], [place], [place] [address]. If you disagree with the  decision, you may appeal to the Department of Administration, . You must file your appeal within 30 days from the date of the  decision letter at [address], [address], [place], [place] [address], or [address], [address], [place], [place] [address]. If you disagree with a decision of an , you may file a petition for judicial review with the District Court. You must do so within 30 days of the Appeal Officer’s decision. You may be represented by an  at your own expense or you may contact the [facility] for possible representation.    [place]  for Injured Workers (NAIW): If you disagree with a  decision, you may request that NAIW represent you without charge at an  Hearing. For information regarding denial of benefits, you may contact the [facility] at: [address], [address], [place] [address], [phone], or [address], [address], [place] [address], [phone]    To File a Complaint with the Division: If you wish to file a complaint with the  of the Division of Industrial Relations (DIR),  please contact the Workers’ Compensation Section, [address], [address], [place], [place] [address], telephone [phone], or [address], [address], [place], [place] [address], telephone [phone].    For assistance with Workers’ Compensation Issues: You may contact the [facility] Office for Consumer Health Assistance, [address], [address], [place], [place] [address], Toll Free 1-355.924.1680, Web site: http://[facility].[place].gov/Programs/MICHELLE E-mail:  marya@govcha.[place].gov  D-2 (rev. 10/20)              __________________________________________________________________                                    _________________            Employee Name / Signature                                                                                                                            Date

## 2022-10-28 NOTE — PROGRESS NOTES
Trauma / Surgical Daily Progress Note    Date of Service  10/28/2022    Chief Complaint  42 y.o. female admitted 10/28/2022 with SDH, rib fractures, C6 fx    Interval Events  Admitted after trauma. Alert. R arm/shoulder pain. On RA. Hemodynamically appropriate. Start diet. Await MRI spine and follow up head CT.     Review of Systems  Review of Systems   Musculoskeletal:  Positive for joint swelling and neck pain.      Vital Signs for last 24 hours  Temp:  [35.2 °C (95.3 °F)-36.4 °C (97.5 °F)] 36.4 °C (97.5 °F)  Pulse:  [52-90] 71  Resp:  [15-30] 15  BP: (100-122)/(54-86) 100/63  SpO2:  [95 %-97 %] 95 %    Hemodynamic parameters for last 24 hours       Respiratory Data     Respiration: 15, Pulse Oximetry: 95 %             Physical Exam  Physical Exam  HENT:      Head:      Comments: Abrasions to left face  Neck:      Comments: C collar  Cardiovascular:      Rate and Rhythm: Normal rate.   Pulmonary:      Effort: Pulmonary effort is normal.   Chest:      Chest wall: Tenderness present.   Abdominal:      General: There is no distension.      Palpations: Abdomen is soft.      Tenderness: There is no abdominal tenderness.   Genitourinary:     Comments: Castillo to gravity  Musculoskeletal:         General: Normal range of motion.      Comments: R arm decreased movement due to pain  No deformity  Moves fingers   Skin:     General: Skin is warm.   Neurological:      General: No focal deficit present.      Mental Status: She is alert.       Laboratory  Recent Results (from the past 24 hour(s))   HCG QUAL SERUM    Collection Time: 10/28/22  4:51 AM   Result Value Ref Range    Beta-Hcg Qualitative Serum Negative Negative   DIAGNOSTIC ALCOHOL    Collection Time: 10/28/22  4:51 AM   Result Value Ref Range    Diagnostic Alcohol <10.1 <10.1 mg/dL   Comp Metabolic Panel    Collection Time: 10/28/22  4:51 AM   Result Value Ref Range    Sodium 135 135 - 145 mmol/L    Potassium 3.5 (L) 3.6 - 5.5 mmol/L    Chloride 102 96 - 112 mmol/L     Co2 20 20 - 33 mmol/L    Anion Gap 13.0 7.0 - 16.0    Glucose 193 (H) 65 - 99 mg/dL    Bun 17 8 - 22 mg/dL    Creatinine 0.70 0.50 - 1.40 mg/dL    Calcium 8.6 8.5 - 10.5 mg/dL    AST(SGOT) 99 (H) 12 - 45 U/L    ALT(SGPT) 82 (H) 2 - 50 U/L    Alkaline Phosphatase 48 30 - 99 U/L    Total Bilirubin 0.2 0.1 - 1.5 mg/dL    Albumin 4.0 3.2 - 4.9 g/dL    Total Protein 6.1 6.0 - 8.2 g/dL    Globulin 2.1 1.9 - 3.5 g/dL    A-G Ratio 1.9 g/dL   CBC WITHOUT DIFFERENTIAL    Collection Time: 10/28/22  4:51 AM   Result Value Ref Range    WBC 19.2 (H) 4.8 - 10.8 K/uL    RBC 4.34 4.20 - 5.40 M/uL    Hemoglobin 14.2 12.0 - 16.0 g/dL    Hematocrit 42.1 37.0 - 47.0 %    MCV 97.0 81.4 - 97.8 fL    MCH 32.7 27.0 - 33.0 pg    MCHC 33.7 33.6 - 35.0 g/dL    RDW 42.3 35.9 - 50.0 fL    Platelet Count 183 164 - 446 K/uL    MPV 9.7 9.0 - 12.9 fL   COD - Adult (Type and Screen)    Collection Time: 10/28/22  4:51 AM   Result Value Ref Range    ABO Grouping Only A     Rh Grouping Only POS     Antibody Screen-Cod NEG    ESTIMATED GFR    Collection Time: 10/28/22  4:51 AM   Result Value Ref Range    GFR (CKD-EPI) 110 >60 mL/min/1.73 m 2   ABO Rh Confirm    Collection Time: 10/28/22  7:08 AM   Result Value Ref Range    ABO Rh Confirm A POS    Prothrombin Time    Collection Time: 10/28/22  7:12 AM   Result Value Ref Range    PT 14.1 12.0 - 14.6 sec    INR 1.10 0.87 - 1.13   APTT    Collection Time: 10/28/22  7:12 AM   Result Value Ref Range    APTT 23.5 (L) 24.7 - 36.0 sec       Fluids    Intake/Output Summary (Last 24 hours) at 10/28/2022 1019  Last data filed at 10/28/2022 1000  Gross per 24 hour   Intake 2133.33 ml   Output 0 ml   Net 2133.33 ml       Core Measures & Quality Metrics  Labs reviewed, Medications reviewed and Radiology images reviewed  Castillo catheter: Critically Ill - Requiring Accurate Measurement of Urinary Output      DVT Prophylaxis: Enoxaparin (Lovenox)  DVT prophylaxis - mechanical: SCDs  Ulcer prophylaxis: Yes    Assessed  for rehab: Patient unable to tolerate rehabilitation therapeutic regimen  RAP Score Total: 0  ETOH Screening    Assessment/Plan  Hip dislocation, left, initial encounter (HCC)- (present on admission)  Assessment & Plan  Left hip dislocation, reduced in ER  Definitive plan pending.  Weight bearing status - Nonweightbearing LLE.  Ministerio Watts MD. Orthopedic Surgeon. [facility].    Lumbar compression fracture, closed, initial encounter (HCC)- (present on admission)  Assessment & Plan  Left L4 superior facet fracture. Fracture through the superolateral L4 superior endplate. Right T12, L1, L2, and L3 transverse process fractures.  Non-operative management. with brace   Logroll precautions.  Josias Schreiber MD. Neurosurgeon. [facility].    Traumatic subdural hemorrhage, initial encounter- (present on admission)  Assessment & Plan  3.3 mm left posterior subdural hematoma, mBIG1  Neuro checks per protocol  Follow up head CT ordered  Speech Language Pathology cognitive evaluation.     Fracture of cervical spinous process, initial encounter (HCC)- (present on admission)  Assessment & Plan  C6 spinous process fracture.  Plan pending MRI.   Cervical spine immobilization  Josias Schreiber MD. Neurosurgeon. [facility].    Closed fracture of multiple ribs of both sides- (present on admission)  Assessment & Plan  Posterior left 10th and 11th rib fractures. Right posterior third rib fracture. Right posterior 12th rib fracture at the costovertebral junction.  Aggressive pulmonary hygiene and multimodal pain management.    Contraindication to deep vein thrombosis (DVT) prophylaxis- (present on admission)  Assessment & Plan  Prophylactic anticoagulation for thrombotic prevention initially contraindicated secondary to elevated bleeding risk.    Nasal septum fracture, closed, initial encounter- (present on admission)  Assessment & Plan  Bilateral nasal bone fractures.  Non operative management.      Abnormal finding on lung imaging- (present on admission)  Assessment & Plan  5 mm right lower lobe pulmonary nodule.  Follow up on outpatient basis.     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   Trauma Green Activation.        Discussed patient condition with RN, RT, Pharmacy, and Patient.  CRITICAL CARE TIME EXCLUDING PROCEDURES: 35    minutes

## 2022-10-28 NOTE — H&P
"    TRAUMA HISTORY AND PHYSICAL    DATE OF SERVICE: 10/28/2022    ACTIVATION LEVEL: Green.     HISTORY OF PRESENT ILLNESS: The patient is a 42 year-old female who was injured on the job after a large boulder fell onto her. She sustained a loss of consciousness, thought she was wearing a helmet.  When she awoke, she complained of left hip, low back, and neck pain.      The patient was triaged to [facility] [place] in accordance with established pre hospital protocols. An expeditious primary and secondary survey with required adjuncts was conducted. See Trauma Narrator for full details.    Upon arrival, the patient had recently received fentanyl and ketamine pre-hospital and propofol for reduction of an identified left hip dislocation, so history could not be obtained.  Her vitals are stable.      PAST MEDICAL HISTORY:   Unable to obtain due to patient condition    PAST SURGICAL HISTORY:   Unable to obtain due to patient condition     ALLERGIES:  Unable to obtain due to patient condition     CURRENT MEDICATIONS:   Unable to obtain due to patient condition    FAMILY HISTORY:   Unable to obtain due to patient condition    SOCIAL HISTORY:   Unable to obtain due to patient condition    REVIEW OF SYSTEMS:   Comprehensive review of systems is not able to be elicited from the patient secondary to the acuity of the clinical situation.    PHYSICAL EXAMINATION:   VITAL SIGNS:   /54   Pulse 85   Temp (!) 35.6 °C (96 °F)   Resp 17   Ht 1.676 m (5' 6\")   Wt 63.5 kg (140 lb)   SpO2 96%     GENERAL:   The patient is in no apparent distress    HEENT:  HEAD: Atraumatic, normocephalic.    EARS: The ear canals and tympanic membranes are normal.Normal pinna bilaterally.    EYES:  The pupils are equal, round, and reactive to light bilaterally.   NOSE: No rhinorrhea.  The bilateral nares are clear.  THROAT: Oral mucosa is moist.  The oropharynx are clear.    FACE:   The midface and jaw are stable. No malocclusion is " evident.    NECK:    The cervical spine was examined utilizing spinal motion restriction.   The cervical spine is immobilized with a hard collar. The trachea is midline. No significant abrasions, lacerations, contusions, punctures, or swelling. No crepitance..    CHEST:    Inspection: Unlabored respirations, no intercostal retractions, paradoxical motion, or accessory muscle use.  Palpation:  The chest is tender bilaterally over the posterior inferior ribs without crepitance. The clavicles are non deformed bilaterally..  Auscultation: clear to auscultation.    CARDIOVASCULAR:    Auscultation: regular rate and rhythm.  Peripheral Pulses: Normal.      ABDOMEN:    Abdomen is soft, nontender, without organomegaly or masses.    BACK/PELVIS:    The thoracolumbar spine was examined utilizing spinal motion restriction.   Inspection and palpation reveal no significant tenderness, swelling, or deformity in the thoracolumbar region.  The pelvis is stable.    RECTAL:  Deferred    GENITOURINARY:  The patient has normal external reproductive anatomy.    EXTREMITIES:  RIGHT ARM: Without deformities, wounds, lacerations, or excoriations.  Full passive and active range of motion without pain.  LEFT ARM: Without deformities, wounds, lacerations, or excoriations.  Full passive and active range of motion without pain.  RIGHT LEG: Without deformities, wounds, lacerations, or excoriations.  Full passive and active range of motion without pain.  LEFT LEG: Without deformities, wounds, lacerations, or excoriations.  Full passive and active range of motion without pain.    NEUROLOGIC:    [place] Coma Scale (GCS) 13 due to recent administration of IV sedation and opiate.   Neurologic examination revealed no focal deficits noted.    SKIN:  The skin is warm, dry, and well purfused.    LABORATORY VALUES:   Recent Labs     10/28/22  0451   WBC 19.2*   RBC 4.34   HEMOGLOBIN 14.2   HEMATOCRIT 42.1   MCV 97.0   MCH 32.7   MCHC 33.7   RDW 42.3    PLATELETCT 183   MPV 9.7     Recent Labs     10/28/22  0451   SODIUM 135   POTASSIUM 3.5*   CHLORIDE 102   CO2 20   GLUCOSE 193*   BUN 17   CREATININE 0.70   CALCIUM 8.6     Recent Labs     10/28/22  0451   ASTSGOT 99*   ALTSGPT 82*   TBILIRUBIN 0.2   ALKPHOSPHAT 48   GLOBULIN 2.1            IMAGING:   CT-TSPINE W/O PLUS RECONS   Final Result         1.  Right T12, L1, and L2 transverse process fractures      CT-LSPINE W/O PLUS RECONS   Final Result         1.  Left L4 superior facet fracture   2.  Fracture through the superolateral L4 superior endplate   3.  Right T12, L1, L2, and L3 transverse process fractures.      CT-CHEST,ABDOMEN,PELVIS WITH   Final Result         1.  Posterior left 10th and 11th rib fractures. Right posterior third rib fracture is seen.   2.  Right posterior 12th rib fracture at the costovertebral junction   3.  Thoracic and lumbar spinal fractures, see dedicated spinal CT for further characterization.   4.  Pulmonary nodules, see nodule follow-up recommendations below.      Fleischner Society pulmonary nodule recommendations:   Low Risk: No routine follow-up      High Risk: Optional CT at 12 months      Comments: Use most suspicious nodule as guide to management. Follow-up intervals may vary according to size and risk.      Low Risk - Minimal or absent history of smoking and of other known risk factors.      High Risk - History of smoking or of other known risk factors.      Note: These recommendations do not apply to lung cancer screening, patients with immunosuppression, or patients with known primary cancer.      Fleischner Society 2017 Guidelines for Management of Incidentally Detected Pulmonary Nodules in Adults      CT-HEAD W/O   Final Result         1.  3.3 mm left posterior subdural hematoma.   2.  Bilateral nasal bone fractures      Based solely on CT findings, the brain injury guideline category is mBIG 1.      SDH < 4mm   IPH < 4mm   SAH < 3 sulci and < 1mm      The original BIG  retrospective analysis found radiographic progression in 0% of BIG 1 patients and 2.6% BIG 2.      These findings were discussed with the patient's clinician, Bob Castillo, on 10/28/2022 5:55 AM.      CT-CSPINE WITHOUT PLUS RECONS   Final Result         1.  C6 spinous process fracture      DX-HIP-UNILATERAL-WITHOUT PELVIS-1 VIEW LEFT   Final Result         1.  No radiographic evidence of acute traumatic injury.      DX-SHOULDER 2+ RIGHT   Final Result         1.  No acute traumatic bony injury. Examination limited due to overlying foreign bodies         DX-PELVIS-1 OR 2 VIEWS   Final Result         1.  Left hip dislocation.      DX-CHEST-LIMITED (1 VIEW)   Final Result         1.  No acute cardiopulmonary disease.   2.  Evaluation of the lungs is limited due to overlying materials.          IMPRESSION AND PLAN:  Hip dislocation, left, initial encounter (HCC)- (present on admission)  Assessment & Plan  Left hip dislocation, reduced in ER  Definitive plan pending.  Weight bearing status - Nonweightbearing LLE.  Ministerio Watts MD. Orthopedic Surgeon. [facility].    Lumbar compression fracture, closed, initial encounter (HCC)- (present on admission)  Assessment & Plan  Left L4 superior facet fracture. Fracture through the superolateral L4 superior endplate. Right T12, L1, L2, and L3 transverse process fractures.  Definitive plan pending.   Logroll precautions.  Josias Schreiber MD. Neurosurgeon. [facility].    Traumatic subdural hemorrhage, initial encounter- (present on admission)  Assessment & Plan  3.3 mm left posterior subdural hematoma, mBIG1  Neuro checks per protocol  Speech Language Pathology cognitive evaluation.     Fracture of cervical spinous process, initial encounter (HCC)- (present on admission)  Assessment & Plan  C6 spinous process fracture.  Definitive plan pending.   Cervical spine immobilization  Josias Schreiber MD. Neurosurgeon. [facility].    Closed  fracture of multiple ribs of both sides- (present on admission)  Assessment & Plan  Posterior left 10th and 11th rib fractures. Right posterior third rib fracture. Right posterior 12th rib fracture at the costovertebral junction.  Aggressive pulmonary hygiene and multimodal pain management.    Contraindication to deep vein thrombosis (DVT) prophylaxis- (present on admission)  Assessment & Plan  Prophylactic anticoagulation for thrombotic prevention initially contraindicated secondary to elevated bleeding risk.    Nasal septum fracture, closed, initial encounter- (present on admission)  Assessment & Plan  Bilateral nasal bone fractures.  Non operative management.     Abnormal finding on lung imaging- (present on admission)  Assessment & Plan  5 mm right lower lobe pulmonary nodule.  Follow up on outpatient basis.     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   Trauma Green Activation.    I independently reviewed pertinent clinical lab tests since admission and ordered additional follow up clinical lab tests.  I independently reviewed pertinent radiographic images and the radiologist's reports since admission and ordered additional follow up radiographic studies.  The details of the available patient records in [facility] (including laboratory tests, culture data, medications, imaging, and other pertinent diagnostic tests) and that information was utilized as warranted in today's medical decision making for this patient.  I personally evaluated the patient condition at bedside.    Care interventions include:   Review of interval medical and surgical history, current medications and outpatient medication reconciliation, interval imaging studies and radiologist interpretation, and interval laboratory values.  Management of rib fractures and orthopedic trauma.  coordination, prioritization, and implementation of therapeutic  interventions for neurosurgical and orthopedic injuries  Management of deep venous thrombosis prophylaxis.    Aggregated care time spent evaluating, reassessing, reviewing documentation, providing care, and managing this patient exclusive of procedures: 65 minutes  ____________________________________   Alex Monge M.D.     [facility] / [facility]

## 2022-10-28 NOTE — ASSESSMENT & PLAN NOTE
Posterior left 10th and 11th rib fractures. Right posterior third rib fracture. Right posterior 12th rib fracture at costovertebral junction.  Aggressive pulmonary hygiene and multimodal pain management.

## 2022-10-28 NOTE — PROGRESS NOTES
Med rec completed per patient at bedside.  Allergies reviewed with patient. NKDA.  No outpatient antibiotics in the last 30 days.  Patient's preferred pharmacy: [facility] in [place], [place].

## 2022-10-28 NOTE — LETTER
"  FORM C-4:  EMPLOYEE’S CLAIM FOR COMPENSATION/ REPORT OF INITIAL TREATMENT  EMPLOYEE’S CLAIM - PROVIDE ALL INFORMATION REQUESTED   First Name Nerissa Last Name Sonny Birthdate 1980  Sex female Claim Number   Home Address [address]             [address] [address]                                   Age  42 y.o. Height  1.676 m (5' 6\") Weight  72.5 kg (159 lb 13.3 oz) [facility]  [id number]  [id number]   Mailing Address [address]              Zip [address] Telephone  [phone] (home)  Primary Language Spoken  English   Insurer   Third Party    Employee's Occupation (Job Title) When Injury or Occupational Disease Occurred     Employer's Name [employer] Telephone [phone]    Employer Address [address]] Zip [address]   Date of Injury  10/27/2022       Hour of Injury  11:00 PM Date Employer Notified  10/27/2022 Last Day of Work after Injury or Occupational Disease  10/27/2022 Supervisor to Whom Injury Reported  Edson Gutierrez   Address or Location of Accident (if applicable) Work [1]   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 Be specific and answer in detail. Use additional sheet if necessary)  Rock fell on us   If you believe that you have an occupational disease, when did you first have knowledge of the disability and it relationship to your employment? N/A Witnesses to the Accident  John   Nature of Injury or Occupational Disease  Workers' Compensation Part(s) of Body Injured or Affected  Soft Tissue, N/A, N/A    I CERTIFY THAT THE ABOVE IS TRUE AND CORRECT TO THE BEST OF MY KNOWLEDGE AND THAT I HAVE PROVIDED THIS INFORMATION IN ORDER TO OBTAIN THE [place]EF[place] CHAPTER 617 OF [place]).  I HEREBY AUTHORIZE ANY " PHYSICIAN, CHIROPRACTOR, SURGEON, PRACTITIONER, OR OTHER PERSON, ANY HOSPITAL, INCLUDING [facility] OR [facility], ANY MEDICAL SERVICE ORGANIZATION, ANY INSURANCE COMPANY, OR OTHER INSTITUTION OR ORGANIZATION TO RELEASE TO EACH OTHER, ANY MEDICAL OR OTHER INFORMATION, INCLUDING BENEFITS PAID OR PAYABLE, PERTINENT TO THIS INJURY OR DISEASE, EXCEPT INFORMATION RELATIVE TO DIAGNOSIS, TREATMENT AND/OR COUNSELING FOR AIDS, PSYCHOLOGICAL CONDITIONS, ALCOHOL OR CONTROLLED SUBSTANCES, FOR WHICH I MUST GIVE SPECIFIC AUTHORIZATION.  A PHOTOSTAT OF THIS AUTHORIZATION SHALL BE AS VALID AS THE ORIGINAL.  Date                                      Place  [facility]                              Employee’s Signature   THIS REPORT MUST BE COMPLETED AND MAILED WITHIN 3 WORKING DAYS OF TREATMENT   Place ORTHO/SPINE                       Name of Facility [facility]   Date  10/21/2022 Diagnosis  (S06.5XAA) SDH (subdural hematoma)  (S73.005A) Dislocation of left hip, initial encounter ([facility])  (S22.43XA) Closed fracture of multiple ribs of both sides, initial encounter  (S12.9XXA) Fracture of cervical spinous process, initial encounter ([facility])  (S32.000A) Lumbar compression fracture, closed, initial encounter ([facility])  (S82.832A) Other closed fracture of distal end of left fibula, initial encounter Is there evidence the injured employee was under the influence of alcohol and/or another controlled substance at the time of accident?   Hour  6:56 AM Description of Injury or Disease  SDH (subdural hematoma)  Dislocation of left hip, initial encounter ([facility])  Closed fracture of multiple ribs of both sides, initial encounter  Fracture of cervical spinous process, initial encounter ([facility])  Lumbar compression fracture, closed, initial encounter ([facility])  Other closed fracture of distal end of left fibula, initial encounter     Treatment     Have you advised the patient to remain off work five days or more?            "  X-Ray Findings    If Yes   From Date    To Date      From information given by the employee, together with medical evidence, can you directly connect this injury or occupational disease as job incurred?   If No, is employee capable of: Full Duty    Modified Duty      Is additional medical care by a physician indicated?   If Modified Duty, Specify any Limitations / Restrictions       Do you know of any previous injury or disease contributing to this condition or occupational disease?      Date 10/31/2022 Print Doctor’s Name Bob Castillo certify the employer’s copy of this form was mailed on:   Address [address] [place] [id number] [phone] INSURER’S USE ONLY   Provider’s Tax ID Number [id number] Telephone Dept: [phone]    Doctor’s Signature   Degree        Form C-4 (rev.10/07)                                                                         BRIEF DESCRIPTION OF RIGHTS AND BENEFITS  (Pursuant to [place]RS 616C.050)    Notice of Injury or Occupational Disease (Incident Report Form C-1): If an injury or occupational disease (OD) arises out of and in the course of employment, you must provide written notice to your employer as soon as practicable, but no later than 7 days after the accident or OD. Your employer shall maintain a sufficient supply of the required forms.    Claim for Compensation (Form C-4): If medical treatment is sought, the form C-4 is available at the place of initial treatment. A completed \"Claim for Compensation\" (Form C-4) must be filed within 90 days after an accident or OD. The treating physician or chiropractor must, within 3 working days after treatment, complete and mail to the employer, the employer's insurer and third-party , the Claim for Compensation.    Medical Treatment: If you require medical treatment for your on-the-job injury or OD, you may be required to select a physician or chiropractor from a list provided by your workers’ compensation insurer, " if it has contracted with an Organization for Managed Care (MCO) or Preferred Provider Organization (PPO) or providers of health care. If your employer has not entered into a contract with an MCO or PPO, you may select a physician or chiropractor from the Panel of Physicians and Chiropractors. Any medical costs related to your industrial injury or OD will be paid by your insurer.    Temporary Total Disability (TTD): If your doctor has certified that you are unable to work for a period of at least 5 consecutive days, or 5 cumulative days in a 20-day period, or places restrictions on you that your employer does not accommodate, you may be entitled to TTD compensation.    Temporary Partial Disability (TPD): If the wage you receive upon reemployment is less than the compensation for TTD to which you are entitled, the insurer may be required to pay you TPD compensation to make up the difference. TPD can only be paid for a maximum of 24 months.    Permanent Partial Disability (PPD): When your medical condition is stable and there is an indication of a PPD as a result of your injury or OD, within 30 days, your insurer must arrange for an evaluation by a rating physician or chiropractor to determine the degree of your PPD. The amount of your PPD award depends on the date of injury, the results of the PPD evaluation, your age and wage.    Permanent Total Disability (PTD): If you are medically certified by a treating physician or chiropractor as permanently and totally disabled and have been granted a PTD status by your insurer, you are entitled to receive monthly benefits not to exceed 66 2/3% of your average monthly wage. The amount of your PTD payments is subject to reduction if you previously received a lump-sum PPD award.    Vocational Rehabilitation Services: You may be eligible for vocational rehabilitation services if you are unable to return to the job due to a permanent physical impairment or permanent restrictions  as a result of your injury or occupational disease.    Transportation and Per Michaela Reimbursement: You may be eligible for travel expenses and per michaela associated with medical treatment.    Reopening: You may be able to reopen your claim if your condition worsens after claim closure.     Appeal Process: If you disagree with a written determination issued by the insurer or the insurer does not respond to your request, you may appeal to the Department of Administration, , by following the instructions contained in your determination letter. You must appeal the determination within 70 days from the date of the determination letter at [address], [address], [place], [place] [address], or [address], [address], [place], [place] [address]. If you disagree with the  decision, you may appeal to the Department of Administration, . You must file your appeal within 30 days from the date of the  decision letter at [address], [address], [place], [place] [address], or [address], [place], [place] [address]. If you disagree with a decision of an , you may file a petition for judicial review with the District Court. You must do so within 30 days of the Appeal Officer’s decision. You may be represented by an  at your own expense or you may contact the [facility] for possible representation.    [place]  for Injured Workers (NAIW): If you disagree with a  decision, you may request that NAIW represent you without charge at an  Hearing. For information regarding denial of benefits, you may contact the [facility] at: [address], [address], [place] [address], [phone], or [address], [address], [place] [address], [phone]    To File a Complaint with the Division: If you wish to file a complaint with the  of the Division of Industrial  Relations (DIR),  please contact the Workers’ Compensation Section, [address], [address], [place], [place] [address], telephone [phone], or [address], [address], [place], [place] [address], telephone [phone].    For assistance with Workers’ Compensation Issues: You may contact the [facility] Office for Consumer Health Assistance, [address], [address], [place], [place] [address], Toll Free 1-156.987.8897, Web site: http://[facility].[place].gov/Programs/MICHELLE E-mail: [email or web address].gov  D-2 (rev. 10/20)              __________________________________________________________________                                    _________________            Employee Name / Signature                                                                                                                            Date

## 2022-10-28 NOTE — ASSESSMENT & PLAN NOTE
Left hip dislocation.  Reduced in ED.  Non-operative management.  Weight bearing status - Weightbearing as tolerated LLE.  Posterior hip precautions.  Follow up 2-4 weeks.  Ministerio Watts MD. Orthopedic Surgeon. [facility].

## 2022-10-28 NOTE — ASSESSMENT & PLAN NOTE
Prophylactic anticoagulation for thrombotic prevention initially contraindicated secondary to elevated bleeding risk.  10/29 Prophylactic dose enoxaparin initiated.

## 2022-10-28 NOTE — ASSESSMENT & PLAN NOTE
C6 spinous process fracture.  10/29 MR with interspinous ligamentous stretch injury, no sign of nerve root injury.  Cervical spine immobilization for 6 weeks.  Josias Schreiber MD. Neurosurgeon. [facility].

## 2022-10-28 NOTE — PROGRESS NOTES
Hr: 80  BP: 110/64  Spo2: 97   Temp: 97.5f  Weight: 68.7 kg          Belongings:  Gold earrings, Pants, underwear, backpack with various items including clothes, food, and self care items.       4 Eyes Skin Assessment Completed by Jeff RN and Tamia RN.    Covered in black soot, skin assessment challenging.    Head multiple abrasions to forehead, right eye, right cheek, left cheek,   EarsWDL  Nose WDL  Mouth WDL  Neck R neck abrasion  Breast/Chest: underneath L breat abrasion, right flank abrasion.   Shoulder Blades: big abrasion to right shoulder, laceration to right clavicle.   Spine: abrasion and bruising in thoracic chest.  (R) Arm/Elbow/Hand: R shoulder bruising.   (L) Arm/Elbow/Hand: L forearm abrasion.   Abdomen WDL  Groin WDL  Scrotum/Coccyx/Buttocks WDL  (R) Leg WDL  (L) Leg: knee immobilizer padded with Mepilex  (R) Heel/Foot/Toe:    (L) Heel/Foot/Toe: Lateral malleolus swelling. Abrasion to the L heel          Devices In Places ECG, Blood Pressure Cuff, Pulse Ox, and SCD's      Interventions In Place Sacral Mepilex and Q2 Turns    Possible Skin Injury Yes    Pictures Uploaded Into Epic Yes  Wound Consult Placed Yes  RN Wound Prevention Protocol Ordered Yes

## 2022-10-28 NOTE — LETTER
"  FORM C-4:  EMPLOYEE’S CLAIM FOR COMPENSATION/ REPORT OF INITIAL TREATMENT  EMPLOYEE’S CLAIM - PROVIDE ALL INFORMATION REQUESTED   First Name Nerissa Last Name Sonny Birthdate 1980  Sex female Claim Number   Home Address [address]             [place] [address]                                   Age  42 y.o. Height  1.676 m (5' 6\") Weight  72.5 kg (159 lb 13.3 oz) [facility]     Mailing Address [address]              Zip [address] Telephone  [phone] (home)  Primary Language Spoken  ENGLISH   Insurer   Third Party   [employer] Employee's Occupation (Job Title) When Injury or Occupational Disease Occurred     Employer's Name [employer] Telephone [phone]    Employer Address [address]] Zip [address]   Date of Injury  10/27/2022       Hour of Injury  11:00 PM Date Employer Notified  10/27/2022 Last Day of Work after Injury or Occupational Disease  10/27/2022 Supervisor to Whom Injury Reported  Edson Gutierrez   Address or Location of Accident (if applicable) Work [1]   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 Be specific and answer in detail. Use additional sheet if necessary)  Rock fell on us   If you believe that you have an occupational disease, when did you first have knowledge of the disability and it relationship to your employment? N/A Witnesses to the Accident  John   Nature of Injury or Occupational Disease  Workers' Compensation Part(s) of Body Injured or Affected  Soft Tissue, N/A, N/A    I CERTIFY THAT THE ABOVE IS TRUE AND CORRECT TO THE BEST OF MY KNOWLEDGE AND THAT I HAVE PROVIDED THIS INFORMATION IN ORDER TO OBTAIN THE B[place]EF[place] OF [place], [place] CHAPTER 617 [place]).  I HEREBY AUTHORIZE ANY PHYSICIAN, " CHIROPRACTOR, SURGEON, PRACTITIONER, OR OTHER PERSON, ANY HOSPITAL, INCLUDING [facility] OR [facility], ANY MEDICAL SERVICE ORGANIZATION, ANY INSURANCE COMPANY, OR OTHER INSTITUTION OR ORGANIZATION TO RELEASE TO EACH OTHER, ANY MEDICAL OR OTHER INFORMATION, INCLUDING BENEFITS PAID OR PAYABLE, PERTINENT TO THIS INJURY OR DISEASE, EXCEPT INFORMATION RELATIVE TO DIAGNOSIS, TREATMENT AND/OR COUNSELING FOR AIDS, PSYCHOLOGICAL CONDITIONS, ALCOHOL OR CONTROLLED SUBSTANCES, FOR WHICH I MUST GIVE SPECIFIC AUTHORIZATION.  A PHOTOSTAT OF THIS AUTHORIZATION SHALL BE AS VALID AS THE ORIGINAL.  Date    10/31/2022              Place   [facility]                                           Employee’s Signature   THIS REPORT MUST BE COMPLETED AND MAILED WITHIN 3 WORKING DAYS OF TREATMENT   Place ORTHO/SPINE                       Name of Facility [facility]   Date  10/21/2022 Diagnosis  (S06.5XAA) SDH (subdural hematoma)  (S73.005A) Dislocation of left hip, initial encounter ([facility])  (S22.43XA) Closed fracture of multiple ribs of both sides, initial encounter  (S12.9XXA) Fracture of cervical spinous process, initial encounter ([facility])  (S32.000A) Lumbar compression fracture, closed, initial encounter ([facility])  (S82.832A) Other closed fracture of distal end of left fibula, initial encounter Is there evidence the injured employee was under the influence of alcohol and/or another controlled substance at the time of accident?   Hour  7:54 AM Description of Injury or Disease  SDH (subdural hematoma)  Dislocation of left hip, initial encounter ([facility])  Closed fracture of multiple ribs of both sides, initial encounter  Fracture of cervical spinous process, initial encounter ([facility])  Lumbar compression fracture, closed, initial encounter ([facility])  Other closed fracture of distal end of left fibula, initial encounter     Treatment     Have you advised the patient to remain off work five days or more?             X-Ray  "Findings    If Yes   From Date    To Date      From information given by the employee, together with medical evidence, can you directly connect this injury or occupational disease as job incurred?   If No, is employee capable of: Full Duty    Modified Duty      Is additional medical care by a physician indicated?   If Modified Duty, Specify any Limitations / Restrictions       Do you know of any previous injury or disease contributing to this condition or occupational disease?      Date 10/31/2022 Print Doctor’s Name Bob Castillo certify the employer’s copy of this form was mailed on:   Address [address] [place] [id number] [phone] INSURER’S USE ONLY   Provider’s Tax ID Number [id number] Telephone Dept: [phone]    Doctor’s Signature   Degree  M.D.      Form C-4 (rev.10/07)                                                                         BRIEF DESCRIPTION OF RIGHTS AND BENEFITS  ([place]ursuant to [place] 616C.050)    Notice of Injury or Occupational Disease (Incident Report Form C-1): If an injury or occupational disease (OD) arises out of and in the course of employment, you must provide written notice to your employer as soon as practicable, but no later than 7 days after the accident or OD. Your employer shall maintain a sufficient supply of the required forms.    Claim for Compensation (Form C-4): If medical treatment is sought, the form C-4 is available at the place of initial treatment. A completed \"Claim for Compensation\" (Form C-4) must be filed within 90 days after an accident or OD. The treating physician or chiropractor must, within 3 working days after treatment, complete and mail to the employer, the employer's insurer and third-party , the Claim for Compensation.    Medical Treatment: If you require medical treatment for your on-the-job injury or OD, you may be required to select a physician or chiropractor from a list provided by your workers’ compensation insurer, if " it has contracted with an Organization for Managed Care (MCO) or Preferred Provider Organization (PPO) or providers of health care. If your employer has not entered into a contract with an MCO or PPO, you may select a physician or chiropractor from the Panel of Physicians and Chiropractors. Any medical costs related to your industrial injury or OD will be paid by your insurer.    Temporary Total Disability (TTD): If your doctor has certified that you are unable to work for a period of at least 5 consecutive days, or 5 cumulative days in a 20-day period, or places restrictions on you that your employer does not accommodate, you may be entitled to TTD compensation.    Temporary Partial Disability (TPD): If the wage you receive upon reemployment is less than the compensation for TTD to which you are entitled, the insurer may be required to pay you TPD compensation to make up the difference. TPD can only be paid for a maximum of 24 months.    Permanent Partial Disability (PPD): When your medical condition is stable and there is an indication of a PPD as a result of your injury or OD, within 30 days, your insurer must arrange for an evaluation by a rating physician or chiropractor to determine the degree of your PPD. The amount of your PPD award depends on the date of injury, the results of the PPD evaluation, your age and wage.    Permanent Total Disability (PTD): If you are medically certified by a treating physician or chiropractor as permanently and totally disabled and have been granted a PTD status by your insurer, you are entitled to receive monthly benefits not to exceed 66 2/3% of your average monthly wage. The amount of your PTD payments is subject to reduction if you previously received a lump-sum PPD award.    Vocational Rehabilitation Services: You may be eligible for vocational rehabilitation services if you are unable to return to the job due to a permanent physical impairment or permanent restrictions as  a result of your injury or occupational disease.    Transportation and Per Michaela Reimbursement: You may be eligible for travel expenses and per michaela associated with medical treatment.    Reopening: You may be able to reopen your claim if your condition worsens after claim closure.     Appeal Process: If you disagree with a written determination issued by the insurer or the insurer does not respond to your request, you may appeal to the Department of Administration, , by following the instructions contained in your determination letter. You must appeal the determination within 70 days from the date of the determination letter at [address], [address], [place], [place] [address], or [address], [place], [place] [address]. If you disagree with the  decision, you may appeal to the Department of Administration, . You must file your appeal within 30 days from the date of the  decision letter at [address], [address], [place], [place] [address], or [address], [place], [place] [address]. If you disagree with a decision of an , you may file a petition for judicial review with the District Court. You must do so within 30 days of the Appeal Officer’s decision. You may be represented by an  at your own expense or you may contact the [facility] for possible representation.    [place]  for Injured Workers (NAIW): If you disagree with a  decision, you may request that NAIW represent you without charge at an  Hearing. For information regarding denial of benefits, you may contact the [facility] at: [address], [address], [place] [address], [phone], or [address] [address], [phone]    To File a Complaint with the Division: If you wish to file a complaint with the  of the Division of Industrial  Relations (DIR),  please contact the Workers’ Compensation Section, [address], [place], [place] [address], telephone ([phone]) [phone], or [address], [address], [place], [place] [address], telephone [phone].    For assistance with Workers’ Compensation Issues: You may contact the [facility] Office for Consumer Health Assistance, [address], [address], [place], [place] [address], Toll Free 1-584.102.7668, Web site: http://[facility].nv.gov/Programs/MICHELLE E-mail: michelle@[email or web address].[email or web address].gov  D-2 (rev. 10/20)              __________________________________________________________________                                    _________________            Employee Name / Signature                                                                                                                            Date

## 2022-10-28 NOTE — ASSESSMENT & PLAN NOTE
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   Trauma Green Activation.  Alex Bryant MD. Trauma Surgery.

## 2022-10-29 ENCOUNTER — APPOINTMENT (OUTPATIENT)
Dept: RADIOLOGY | Facility: MEDICAL CENTER | Age: 42
DRG: 964 | End: 2022-10-29
Attending: SURGERY
Payer: COMMERCIAL

## 2022-10-29 PROBLEM — R33.9 URINARY RETENTION: Status: ACTIVE | Noted: 2022-10-29

## 2022-10-29 PROBLEM — M25.511 ACUTE PAIN OF RIGHT SHOULDER: Status: ACTIVE | Noted: 2022-10-29

## 2022-10-29 PROBLEM — Z78.9 NO CONTRAINDICATION TO DEEP VEIN THROMBOSIS (DVT) PROPHYLAXIS: Status: ACTIVE | Noted: 2022-10-28

## 2022-10-29 LAB
ANION GAP SERPL CALC-SCNC: 9 MMOL/L (ref 7–16)
BASOPHILS # BLD AUTO: 0.5 % (ref 0–1.8)
BASOPHILS # BLD: 0.03 K/UL (ref 0–0.12)
BUN SERPL-MCNC: 12 MG/DL (ref 8–22)
CALCIUM SERPL-MCNC: 8.3 MG/DL (ref 8.5–10.5)
CHLORIDE SERPL-SCNC: 105 MMOL/L (ref 96–112)
CO2 SERPL-SCNC: 24 MMOL/L (ref 20–33)
CREAT SERPL-MCNC: 0.77 MG/DL (ref 0.5–1.4)
EOSINOPHIL # BLD AUTO: 0.04 K/UL (ref 0–0.51)
EOSINOPHIL NFR BLD: 0.7 % (ref 0–6.9)
ERYTHROCYTE [DISTWIDTH] IN BLOOD BY AUTOMATED COUNT: 44.4 FL (ref 35.9–50)
GFR SERPLBLD CREATININE-BSD FMLA CKD-EPI: 98 ML/MIN/1.73 M 2
GLUCOSE SERPL-MCNC: 103 MG/DL (ref 65–99)
HCT VFR BLD AUTO: 34.6 % (ref 37–47)
HGB BLD-MCNC: 11.5 G/DL (ref 12–16)
IMM GRANULOCYTES # BLD AUTO: 0.01 K/UL (ref 0–0.11)
IMM GRANULOCYTES NFR BLD AUTO: 0.2 % (ref 0–0.9)
LYMPHOCYTES # BLD AUTO: 2.02 K/UL (ref 1–4.8)
LYMPHOCYTES NFR BLD: 34.9 % (ref 22–41)
MCH RBC QN AUTO: 33.2 PG (ref 27–33)
MCHC RBC AUTO-ENTMCNC: 33.2 G/DL (ref 33.6–35)
MCV RBC AUTO: 100 FL (ref 81.4–97.8)
MONOCYTES # BLD AUTO: 0.46 K/UL (ref 0–0.85)
MONOCYTES NFR BLD AUTO: 7.9 % (ref 0–13.4)
NEUTROPHILS # BLD AUTO: 3.23 K/UL (ref 2–7.15)
NEUTROPHILS NFR BLD: 55.8 % (ref 44–72)
NRBC # BLD AUTO: 0 K/UL
NRBC BLD-RTO: 0 /100 WBC
PLATELET # BLD AUTO: 204 K/UL (ref 164–446)
PMV BLD AUTO: 9 FL (ref 9–12.9)
POTASSIUM SERPL-SCNC: 3.9 MMOL/L (ref 3.6–5.5)
RBC # BLD AUTO: 3.46 M/UL (ref 4.2–5.4)
SODIUM SERPL-SCNC: 138 MMOL/L (ref 135–145)
WBC # BLD AUTO: 5.8 K/UL (ref 4.8–10.8)

## 2022-10-29 PROCEDURE — 302135 SEQUENTIAL COMPRESSION MACHINE: Performed by: SURGERY

## 2022-10-29 PROCEDURE — 72158 MRI LUMBAR SPINE W/O & W/DYE: CPT

## 2022-10-29 PROCEDURE — A9576 INJ PROHANCE MULTIPACK: HCPCS | Performed by: SURGERY

## 2022-10-29 PROCEDURE — A9270 NON-COVERED ITEM OR SERVICE: HCPCS | Performed by: NURSE PRACTITIONER

## 2022-10-29 PROCEDURE — 72156 MRI NECK SPINE W/O & W/DYE: CPT

## 2022-10-29 PROCEDURE — 99233 SBSQ HOSP IP/OBS HIGH 50: CPT | Performed by: SURGERY

## 2022-10-29 PROCEDURE — 770001 HCHG ROOM/CARE - MED/SURG/GYN PRIV*

## 2022-10-29 PROCEDURE — A9270 NON-COVERED ITEM OR SERVICE: HCPCS | Performed by: SURGERY

## 2022-10-29 PROCEDURE — 85025 COMPLETE CBC W/AUTO DIFF WBC: CPT

## 2022-10-29 PROCEDURE — 700117 HCHG RX CONTRAST REV CODE 255: Performed by: SURGERY

## 2022-10-29 PROCEDURE — 700105 HCHG RX REV CODE 258: Performed by: SURGERY

## 2022-10-29 PROCEDURE — 71045 X-RAY EXAM CHEST 1 VIEW: CPT

## 2022-10-29 PROCEDURE — 700102 HCHG RX REV CODE 250 W/ 637 OVERRIDE(OP): Performed by: SURGERY

## 2022-10-29 PROCEDURE — 80048 BASIC METABOLIC PNL TOTAL CA: CPT

## 2022-10-29 PROCEDURE — 700102 HCHG RX REV CODE 250 W/ 637 OVERRIDE(OP): Performed by: NURSE PRACTITIONER

## 2022-10-29 PROCEDURE — 700111 HCHG RX REV CODE 636 W/ 250 OVERRIDE (IP): Performed by: SURGERY

## 2022-10-29 RX ORDER — LEVETIRACETAM 500 MG/1
500 TABLET ORAL 2 TIMES DAILY
Status: DISCONTINUED | OUTPATIENT
Start: 2022-10-29 | End: 2022-11-02 | Stop reason: HOSPADM

## 2022-10-29 RX ADMIN — METAXALONE 800 MG: 800 TABLET ORAL at 11:59

## 2022-10-29 RX ADMIN — DOCUSATE SODIUM 100 MG: 100 CAPSULE, LIQUID FILLED ORAL at 05:04

## 2022-10-29 RX ADMIN — GABAPENTIN 100 MG: 100 CAPSULE ORAL at 13:34

## 2022-10-29 RX ADMIN — GABAPENTIN 100 MG: 100 CAPSULE ORAL at 21:58

## 2022-10-29 RX ADMIN — ACETAMINOPHEN 650 MG: 325 TABLET, FILM COATED ORAL at 08:44

## 2022-10-29 RX ADMIN — HYDROMORPHONE HYDROCHLORIDE 0.5 MG: 1 INJECTION, SOLUTION INTRAMUSCULAR; INTRAVENOUS; SUBCUTANEOUS at 21:00

## 2022-10-29 RX ADMIN — OXYCODONE HYDROCHLORIDE 10 MG: 10 TABLET ORAL at 12:13

## 2022-10-29 RX ADMIN — ACETAMINOPHEN 650 MG: 325 TABLET, FILM COATED ORAL at 17:21

## 2022-10-29 RX ADMIN — ACETAMINOPHEN 650 MG: 325 TABLET, FILM COATED ORAL at 21:58

## 2022-10-29 RX ADMIN — OXYCODONE HYDROCHLORIDE 10 MG: 10 TABLET ORAL at 15:27

## 2022-10-29 RX ADMIN — LEVETIRACETAM 500 MG: 500 TABLET, FILM COATED ORAL at 17:21

## 2022-10-29 RX ADMIN — OXYCODONE HYDROCHLORIDE 10 MG: 10 TABLET ORAL at 08:42

## 2022-10-29 RX ADMIN — SODIUM CHLORIDE, POTASSIUM CHLORIDE, SODIUM LACTATE AND CALCIUM CHLORIDE: 600; 310; 30; 20 INJECTION, SOLUTION INTRAVENOUS at 04:57

## 2022-10-29 RX ADMIN — METAXALONE 800 MG: 800 TABLET ORAL at 17:21

## 2022-10-29 RX ADMIN — OXYCODONE HYDROCHLORIDE 10 MG: 10 TABLET ORAL at 04:57

## 2022-10-29 RX ADMIN — ENOXAPARIN SODIUM 30 MG: 30 INJECTION SUBCUTANEOUS at 17:21

## 2022-10-29 RX ADMIN — GABAPENTIN 100 MG: 100 CAPSULE ORAL at 05:04

## 2022-10-29 RX ADMIN — OXYCODONE HYDROCHLORIDE 10 MG: 10 TABLET ORAL at 19:00

## 2022-10-29 RX ADMIN — GADOTERIDOL 15 ML: 279.3 INJECTION, SOLUTION INTRAVENOUS at 20:17

## 2022-10-29 RX ADMIN — POLYETHYLENE GLYCOL 3350 1 PACKET: 17 POWDER, FOR SOLUTION ORAL at 05:04

## 2022-10-29 RX ADMIN — ACETAMINOPHEN 650 MG: 325 TABLET, FILM COATED ORAL at 12:13

## 2022-10-29 RX ADMIN — OXYCODONE 5 MG: 5 TABLET ORAL at 22:03

## 2022-10-29 RX ADMIN — METAXALONE 800 MG: 800 TABLET ORAL at 05:04

## 2022-10-29 ASSESSMENT — PAIN DESCRIPTION - PAIN TYPE
TYPE: ACUTE PAIN
TYPE: ACUTE PAIN;SURGICAL PAIN
TYPE: ACUTE PAIN;SURGICAL PAIN
TYPE: ACUTE PAIN
TYPE: ACUTE PAIN
TYPE: ACUTE PAIN;SURGICAL PAIN
TYPE: ACUTE PAIN

## 2022-10-29 ASSESSMENT — ENCOUNTER SYMPTOMS
NECK PAIN: 1
JOINT SWELLING: 1

## 2022-10-29 ASSESSMENT — FIBROSIS 4 INDEX: FIB4 SCORE: 2.25

## 2022-10-29 NOTE — CARE PLAN
Problem: Knowledge Deficit - Standard  Goal: Patient and family/care givers will demonstrate understanding of plan of care, disease process/condition, diagnostic tests and medications  Outcome: Progressing     Problem: Pain - Standard  Goal: Alleviation of pain or a reduction in pain to the patient’s comfort goal  Outcome: Progressing     Problem: Skin Integrity  Goal: Skin integrity is maintained or improved  Outcome: Progressing   The patient is Stable - Low risk of patient condition declining or worsening    Shift Goals  Clinical Goals: Pain control, stable neuro  Patient Goals: Comfort  Family Goals:  (No family present)    Progress made toward(s) clinical / shift goals:  Pt reports pain meds are controlling pain, pt verbalizes understanding of condition and treatment plan, skin integrity maintained.    Patient is not progressing towards the following goals:N/A

## 2022-10-29 NOTE — CARE PLAN
The patient is Watcher - Medium risk of patient condition declining or worsening    Shift Goals  Clinical Goals: Stable neuro, stable pain control  Patient Goals: Comfort  Family Goals: Updates, comfort    Progress made toward(s) clinical / shift goals:    Patient understands plan of care, pain managed per MAR and appropriate interventions, skin integrity maintained.    Problem: Knowledge Deficit - Standard  Goal: Patient and family/care givers will demonstrate understanding of plan of care, disease process/condition, diagnostic tests and medications  Outcome: Progressing     Problem: Pain - Standard  Goal: Alleviation of pain or a reduction in pain to the patient’s comfort goal  Outcome: Progressing     Problem: Skin Integrity  Goal: Skin integrity is maintained or improved  Outcome: Progressing       Patient is not progressing towards the following goals:

## 2022-10-29 NOTE — CARE PLAN
The patient is Watcher - Medium risk of patient condition declining or worsening    Shift Goals  Clinical Goals: Pain control, rest  Patient Goals: Pain control, rest.  Family Goals:  (No family present)    Progress made toward(s) clinical / shift goals:    Problem: Knowledge Deficit - Standard  Goal: Patient and family/care givers will demonstrate understanding of plan of care, disease process/condition, diagnostic tests and medications  Outcome: Progressing     Problem: Pain - Standard  Goal: Alleviation of pain or a reduction in pain to the patient’s comfort goal  Outcome: Progressing     Problem: Skin Integrity  Goal: Skin integrity is maintained or improved  Outcome: Progressing       Patient is not progressing towards the following goals:

## 2022-10-29 NOTE — PROGRESS NOTES
Pt transported via bed to Room T333 William, JEMMA.  at bedside. All personal belongings transported with pt. Pt wished well.

## 2022-10-29 NOTE — PROGRESS NOTES
Trauma / Surgical Daily Progress Note    Date of Service  10/29/2022    Chief Complaint  42 y.o. female admitted 10/28/2022 with SDH, rib fractures, C6 fx    Interval Events  No changes overnight. Pain issues. Awaiting MRI. Still  R arm/shoulder pain/numbness.  On RA. Hemodynamically appropriate. Approaching sexton care.      Review of Systems  Review of Systems   Musculoskeletal:  Positive for joint swelling and neck pain.      Vital Signs for last 24 hours  Temp:  [36.8 °C (98.2 °F)-37.4 °C (99.4 °F)] 37.3 °C (99.1 °F)  Pulse:  [67-97] 76  Resp:  [14-22] 19  BP: ()/(47-76) 105/76  SpO2:  [92 %-97 %] 94 %    Hemodynamic parameters for last 24 hours       Respiratory Data     Respiration: 19, Pulse Oximetry: 94 %        RUL Breath Sounds: Clear, RML Breath Sounds: Clear, RLL Breath Sounds: Diminished, ELAINE Breath Sounds: Clear, LLL Breath Sounds: Diminished    Physical Exam  Physical Exam  HENT:      Head:      Comments: Abrasions to left face  Neck:      Comments: C collar  Cardiovascular:      Rate and Rhythm: Normal rate.   Pulmonary:      Effort: Pulmonary effort is normal.   Chest:      Chest wall: Tenderness present.   Abdominal:      General: There is no distension.      Palpations: Abdomen is soft.      Tenderness: There is no abdominal tenderness.   Genitourinary:     Comments: Castillo to gravity  Musculoskeletal:         General: Normal range of motion.      Comments: R arm decreased movement due to pain at shoulder/elbow.  No deformity  Moves fingers   Skin:     General: Skin is warm.   Neurological:      General: No focal deficit present.      Mental Status: She is alert.       Laboratory  Recent Results (from the past 24 hour(s))   CBC WITH DIFFERENTIAL    Collection Time: 10/29/22  5:07 AM   Result Value Ref Range    WBC 5.8 4.8 - 10.8 K/uL    RBC 3.46 (L) 4.20 - 5.40 M/uL    Hemoglobin 11.5 (L) 12.0 - 16.0 g/dL    Hematocrit 34.6 (L) 37.0 - 47.0 %    .0 (H) 81.4 - 97.8 fL    MCH 33.2 (H)  27.0 - 33.0 pg    MCHC 33.2 (L) 33.6 - 35.0 g/dL    RDW 44.4 35.9 - 50.0 fL    Platelet Count 204 164 - 446 K/uL    MPV 9.0 9.0 - 12.9 fL    Neutrophils-Polys 55.80 44.00 - 72.00 %    Lymphocytes 34.90 22.00 - 41.00 %    Monocytes 7.90 0.00 - 13.40 %    Eosinophils 0.70 0.00 - 6.90 %    Basophils 0.50 0.00 - 1.80 %    Immature Granulocytes 0.20 0.00 - 0.90 %    Nucleated RBC 0.00 /100 WBC    Neutrophils (Absolute) 3.23 2.00 - 7.15 K/uL    Lymphs (Absolute) 2.02 1.00 - 4.80 K/uL    Monos (Absolute) 0.46 0.00 - 0.85 K/uL    Eos (Absolute) 0.04 0.00 - 0.51 K/uL    Baso (Absolute) 0.03 0.00 - 0.12 K/uL    Immature Granulocytes (abs) 0.01 0.00 - 0.11 K/uL    NRBC (Absolute) 0.00 K/uL   Basic Metabolic Panel    Collection Time: 10/29/22  5:07 AM   Result Value Ref Range    Sodium 138 135 - 145 mmol/L    Potassium 3.9 3.6 - 5.5 mmol/L    Chloride 105 96 - 112 mmol/L    Co2 24 20 - 33 mmol/L    Glucose 103 (H) 65 - 99 mg/dL    Bun 12 8 - 22 mg/dL    Creatinine 0.77 0.50 - 1.40 mg/dL    Calcium 8.3 (L) 8.5 - 10.5 mg/dL    Anion Gap 9.0 7.0 - 16.0   ESTIMATED GFR    Collection Time: 10/29/22  5:07 AM   Result Value Ref Range    GFR (CKD-EPI) 98 >60 mL/min/1.73 m 2       Fluids    Intake/Output Summary (Last 24 hours) at 10/29/2022 0950  Last data filed at 10/29/2022 0800  Gross per 24 hour   Intake 2883.33 ml   Output 3725 ml   Net -841.67 ml       Core Measures & Quality Metrics  Labs reviewed, Medications reviewed and Radiology images reviewed  Castillo catheter: Critically Ill - Requiring Accurate Measurement of Urinary Output      DVT Prophylaxis: Enoxaparin (Lovenox)  DVT prophylaxis - mechanical: SCDs  Ulcer prophylaxis: Yes    Assessed for rehab: Patient unable to tolerate rehabilitation therapeutic regimen  RAP Score Total: 6  CAGE Results: not completed Blood Alcohol>0.08: no     Assessment/Plan  Lumbar compression fracture, closed, initial encounter (HCC)- (present on admission)  Assessment & Plan  Left L4 superior  facet fracture. Fracture through superolateral L4 superior endplate. Right T12, L1, L2, and L3 transverse process fractures.  MRI pending  Non-operative management. with abdominal binder  Logroll precautions.  Josias Schreiber MD. Neurosurgeon. [facility].    Traumatic subdural hemorrhage, initial encounter- (present on admission)  Assessment & Plan  3.3 mm left posterior subdural hematoma, mBIG1  Repeat interval CT imaging brain demonstrated no significant change or progression.  Speech Language Pathology cognitive evaluation.     Fracture of cervical spinous process, initial encounter ([facility])- (present on admission)  Assessment & Plan  C6 spinous process fracture.  Pending MRI.   Cervical spine immobilization  Josias Schreiber MD. Neurosurgeon. [facility].    Closed fracture of multiple ribs of both sides- (present on admission)  Assessment & Plan  Posterior left 10th and 11th rib fractures. Right posterior third rib fracture. Right posterior 12th rib fracture at costovertebral junction.  Aggressive pulmonary hygiene and multimodal pain management.     Acute pain of right shoulder  Assessment & Plan  R shoulder pain/numbness  XR neg for fracture  Await C spine MRI  Pain management    Closed fracture of distal end of left fibula- (present on admission)  Assessment & Plan  Left distal fibula fracture  Non-operative management. Splint placed.  Weight bearing status - Weightbearing as tolerated LLE.  Ministerio Watts MD. Orthopedic Surgeon. [facility].     Contraindication to deep vein thrombosis (DVT) prophylaxis- (present on admission)  Assessment & Plan  Prophylactic anticoagulation for thrombotic prevention initially contraindicated secondary to elevated bleeding risk.    Hip dislocation, left, initial encounter (HCC)- (present on admission)  Assessment & Plan  Left hip dislocation, reduced in ER  Non-operative management.  Weight bearing status - Weightbearing as tolerated LLE.  Maintain posterior hip precautions  Ministerio Watts MD. Orthopedic Surgeon. [facility].    Nasal septum fracture, closed, initial encounter- (present on admission)  Assessment & Plan  Bilateral nasal bone fractures.  Non operative management.     Abnormal finding on lung imaging- (present on admission)  Assessment & Plan  5 mm right lower lobe pulmonary nodule.  Follow up on outpatient basis.     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   Trauma Green Activation.      Discussed patient condition with RN, RT, Pharmacy, and Patient.  CRITICAL CARE TIME EXCLUDING PROCEDURES: 37    minutes

## 2022-10-29 NOTE — ASSESSMENT & PLAN NOTE
Right shoulder pain/numbness. Motor function intact.  Plain film negative for fracture.  Analgesia.

## 2022-10-30 ENCOUNTER — APPOINTMENT (OUTPATIENT)
Dept: RADIOLOGY | Facility: MEDICAL CENTER | Age: 42
DRG: 964 | End: 2022-10-30
Attending: SURGERY
Payer: COMMERCIAL

## 2022-10-30 PROBLEM — Z02.9 DISCHARGE PLANNING ISSUES: Status: ACTIVE | Noted: 2022-10-30

## 2022-10-30 LAB
ANION GAP SERPL CALC-SCNC: 10 MMOL/L (ref 7–16)
BASOPHILS # BLD AUTO: 0.3 % (ref 0–1.8)
BASOPHILS # BLD: 0.02 K/UL (ref 0–0.12)
BUN SERPL-MCNC: 10 MG/DL (ref 8–22)
CALCIUM SERPL-MCNC: 8.4 MG/DL (ref 8.5–10.5)
CHLORIDE SERPL-SCNC: 102 MMOL/L (ref 96–112)
CO2 SERPL-SCNC: 26 MMOL/L (ref 20–33)
CREAT SERPL-MCNC: 0.67 MG/DL (ref 0.5–1.4)
EOSINOPHIL # BLD AUTO: 0.16 K/UL (ref 0–0.51)
EOSINOPHIL NFR BLD: 2.7 % (ref 0–6.9)
ERYTHROCYTE [DISTWIDTH] IN BLOOD BY AUTOMATED COUNT: 43.1 FL (ref 35.9–50)
GFR SERPLBLD CREATININE-BSD FMLA CKD-EPI: 112 ML/MIN/1.73 M 2
GLUCOSE SERPL-MCNC: 87 MG/DL (ref 65–99)
HCT VFR BLD AUTO: 34.7 % (ref 37–47)
HGB BLD-MCNC: 11.3 G/DL (ref 12–16)
IMM GRANULOCYTES # BLD AUTO: 0.03 K/UL (ref 0–0.11)
IMM GRANULOCYTES NFR BLD AUTO: 0.5 % (ref 0–0.9)
LYMPHOCYTES # BLD AUTO: 1.27 K/UL (ref 1–4.8)
LYMPHOCYTES NFR BLD: 21.7 % (ref 22–41)
MCH RBC QN AUTO: 32.5 PG (ref 27–33)
MCHC RBC AUTO-ENTMCNC: 32.6 G/DL (ref 33.6–35)
MCV RBC AUTO: 99.7 FL (ref 81.4–97.8)
MONOCYTES # BLD AUTO: 0.39 K/UL (ref 0–0.85)
MONOCYTES NFR BLD AUTO: 6.7 % (ref 0–13.4)
NEUTROPHILS # BLD AUTO: 3.98 K/UL (ref 2–7.15)
NEUTROPHILS NFR BLD: 68.1 % (ref 44–72)
NRBC # BLD AUTO: 0 K/UL
NRBC BLD-RTO: 0 /100 WBC
PLATELET # BLD AUTO: 190 K/UL (ref 164–446)
PMV BLD AUTO: 8.9 FL (ref 9–12.9)
POTASSIUM SERPL-SCNC: 3.9 MMOL/L (ref 3.6–5.5)
RBC # BLD AUTO: 3.48 M/UL (ref 4.2–5.4)
SODIUM SERPL-SCNC: 138 MMOL/L (ref 135–145)
WBC # BLD AUTO: 5.9 K/UL (ref 4.8–10.8)

## 2022-10-30 PROCEDURE — 97166 OT EVAL MOD COMPLEX 45 MIN: CPT

## 2022-10-30 PROCEDURE — 85025 COMPLETE CBC W/AUTO DIFF WBC: CPT

## 2022-10-30 PROCEDURE — 700102 HCHG RX REV CODE 250 W/ 637 OVERRIDE(OP): Performed by: NURSE PRACTITIONER

## 2022-10-30 PROCEDURE — 700102 HCHG RX REV CODE 250 W/ 637 OVERRIDE(OP): Performed by: SURGERY

## 2022-10-30 PROCEDURE — 97162 PT EVAL MOD COMPLEX 30 MIN: CPT

## 2022-10-30 PROCEDURE — 80048 BASIC METABOLIC PNL TOTAL CA: CPT

## 2022-10-30 PROCEDURE — 97535 SELF CARE MNGMENT TRAINING: CPT

## 2022-10-30 PROCEDURE — 36415 COLL VENOUS BLD VENIPUNCTURE: CPT

## 2022-10-30 PROCEDURE — A9270 NON-COVERED ITEM OR SERVICE: HCPCS | Performed by: SURGERY

## 2022-10-30 PROCEDURE — 770001 HCHG ROOM/CARE - MED/SURG/GYN PRIV*

## 2022-10-30 PROCEDURE — A9270 NON-COVERED ITEM OR SERVICE: HCPCS | Performed by: NURSE PRACTITIONER

## 2022-10-30 PROCEDURE — 99232 SBSQ HOSP IP/OBS MODERATE 35: CPT | Performed by: NURSE PRACTITIONER

## 2022-10-30 PROCEDURE — 71045 X-RAY EXAM CHEST 1 VIEW: CPT

## 2022-10-30 PROCEDURE — 99233 SBSQ HOSP IP/OBS HIGH 50: CPT | Performed by: NURSE PRACTITIONER

## 2022-10-30 PROCEDURE — 700111 HCHG RX REV CODE 636 W/ 250 OVERRIDE (IP): Performed by: SURGERY

## 2022-10-30 RX ORDER — GABAPENTIN 300 MG/1
300 CAPSULE ORAL EVERY 8 HOURS
Status: DISCONTINUED | OUTPATIENT
Start: 2022-10-30 | End: 2022-11-02 | Stop reason: HOSPADM

## 2022-10-30 RX ORDER — LIDOCAINE 50 MG/G
1-3 PATCH TOPICAL EVERY 24 HOURS
Status: DISCONTINUED | OUTPATIENT
Start: 2022-10-31 | End: 2022-11-02 | Stop reason: HOSPADM

## 2022-10-30 RX ADMIN — ACETAMINOPHEN 650 MG: 325 TABLET, FILM COATED ORAL at 22:19

## 2022-10-30 RX ADMIN — ACETAMINOPHEN 650 MG: 325 TABLET, FILM COATED ORAL at 17:26

## 2022-10-30 RX ADMIN — SENNOSIDES AND DOCUSATE SODIUM 1 TABLET: 50; 8.6 TABLET ORAL at 22:19

## 2022-10-30 RX ADMIN — METAXALONE 800 MG: 800 TABLET ORAL at 04:11

## 2022-10-30 RX ADMIN — METAXALONE 800 MG: 800 TABLET ORAL at 13:39

## 2022-10-30 RX ADMIN — DOCUSATE SODIUM 100 MG: 100 CAPSULE, LIQUID FILLED ORAL at 17:26

## 2022-10-30 RX ADMIN — ACETAMINOPHEN 650 MG: 325 TABLET, FILM COATED ORAL at 09:22

## 2022-10-30 RX ADMIN — ACETAMINOPHEN 650 MG: 325 TABLET, FILM COATED ORAL at 13:39

## 2022-10-30 RX ADMIN — OXYCODONE HYDROCHLORIDE 10 MG: 10 TABLET ORAL at 18:22

## 2022-10-30 RX ADMIN — METAXALONE 800 MG: 800 TABLET ORAL at 17:26

## 2022-10-30 RX ADMIN — GABAPENTIN 300 MG: 300 CAPSULE ORAL at 22:19

## 2022-10-30 RX ADMIN — LEVETIRACETAM 500 MG: 500 TABLET, FILM COATED ORAL at 17:29

## 2022-10-30 RX ADMIN — OXYCODONE HYDROCHLORIDE 10 MG: 10 TABLET ORAL at 22:19

## 2022-10-30 RX ADMIN — OXYCODONE HYDROCHLORIDE 10 MG: 10 TABLET ORAL at 13:40

## 2022-10-30 RX ADMIN — ENOXAPARIN SODIUM 30 MG: 30 INJECTION SUBCUTANEOUS at 17:25

## 2022-10-30 RX ADMIN — DOCUSATE SODIUM 100 MG: 100 CAPSULE, LIQUID FILLED ORAL at 04:12

## 2022-10-30 RX ADMIN — GABAPENTIN 100 MG: 100 CAPSULE ORAL at 04:12

## 2022-10-30 RX ADMIN — OXYCODONE HYDROCHLORIDE 10 MG: 10 TABLET ORAL at 04:12

## 2022-10-30 RX ADMIN — LEVETIRACETAM 500 MG: 500 TABLET, FILM COATED ORAL at 04:12

## 2022-10-30 RX ADMIN — ENOXAPARIN SODIUM 30 MG: 30 INJECTION SUBCUTANEOUS at 04:11

## 2022-10-30 RX ADMIN — OXYCODONE HYDROCHLORIDE 10 MG: 10 TABLET ORAL at 09:23

## 2022-10-30 RX ADMIN — GABAPENTIN 300 MG: 300 CAPSULE ORAL at 13:39

## 2022-10-30 RX ADMIN — POLYETHYLENE GLYCOL 3350 1 PACKET: 17 POWDER, FOR SOLUTION ORAL at 17:29

## 2022-10-30 ASSESSMENT — COGNITIVE AND FUNCTIONAL STATUS - GENERAL
EATING MEALS: A LITTLE
PERSONAL GROOMING: A LOT
HELP NEEDED FOR BATHING: A LOT
MOVING FROM LYING ON BACK TO SITTING ON SIDE OF FLAT BED: UNABLE
DRESSING REGULAR UPPER BODY CLOTHING: A LOT
TOILETING: A LOT
SUGGESTED CMS G CODE MODIFIER MOBILITY: CM
MOVING TO AND FROM BED TO CHAIR: UNABLE
TURNING FROM BACK TO SIDE WHILE IN FLAT BAD: UNABLE
CLIMB 3 TO 5 STEPS WITH RAILING: TOTAL
WALKING IN HOSPITAL ROOM: TOTAL
DAILY ACTIVITIY SCORE: 13
DRESSING REGULAR LOWER BODY CLOTHING: A LOT
MOBILITY SCORE: 8
STANDING UP FROM CHAIR USING ARMS: A LITTLE
SUGGESTED CMS G CODE MODIFIER DAILY ACTIVITY: CL

## 2022-10-30 ASSESSMENT — GAIT ASSESSMENTS: GAIT LEVEL OF ASSIST: UNABLE TO PARTICIPATE

## 2022-10-30 ASSESSMENT — ENCOUNTER SYMPTOMS
SPEECH CHANGE: 0
SHORTNESS OF BREATH: 0
BLURRED VISION: 0
DOUBLE VISION: 0
CHILLS: 0
NECK PAIN: 1
HEADACHES: 0
BACK PAIN: 1
MYALGIAS: 1
FOCAL WEAKNESS: 0
NAUSEA: 0
CONSTIPATION: 1
ABDOMINAL PAIN: 0
FEVER: 0
TINGLING: 0
DIZZINESS: 0
SENSORY CHANGE: 1
VOMITING: 0

## 2022-10-30 ASSESSMENT — PAIN DESCRIPTION - PAIN TYPE
TYPE: ACUTE PAIN

## 2022-10-30 ASSESSMENT — ACTIVITIES OF DAILY LIVING (ADL): TOILETING: INDEPENDENT

## 2022-10-30 NOTE — PROGRESS NOTES
4 Eyes Skin Assessment Completed by AZRA Lawrence and AZRA Aguilar.    Head Bruising and Scratch  Ears WDL  Nose WDL  Mouth WDL  Neck Redness and Blanching  Breast/Chest WDL  Shoulder Blades   Spine Bruising and Abrasions  (R) Arm/Elbow/Hand WDL  (L) Arm/Elbow/Hand Bruising and Swelling  Abdomen WDL  Groin WDL  Scrotum/Coccyx/Buttocks Redness and Blanching  (R) Leg WDL  (L) Leg Swelling  (R) Heel/Foot/Toe WDL  (L) Heel/Foot/Toe Swelling          Devices In Places L Ankle Brace, Blood Pressure Cuff, Pulse Ox, Castillo, and Cervical Collar      Interventions In Place Mepilex under C Collar, Sacral Mepilex, Pillows, Dri-Cody Pads, and Pressure Redistribution Mattress    Possible Skin Injury No    Pictures Uploaded Into Epic N/A  Wound Consult Placed N/A  RN Wound Prevention Protocol Ordered No

## 2022-10-30 NOTE — CARE PLAN
The patient is Watcher - Medium risk of patient condition declining or worsening    Shift Goals  Clinical Goals: Pain management, q 4 neuro checks, safety  Patient Goals: pain control, rest  Family Goals: DONAVON    Progress made toward(s) clinical / shift goals:       Problem: Knowledge Deficit - Standard  Goal: Patient and family/care givers will demonstrate understanding of plan of care, disease process/condition, diagnostic tests and medications  Outcome: Progressing     Problem: Pain - Standard  Goal: Alleviation of pain or a reduction in pain to the patient’s comfort goal  Outcome: Progressing     Problem: Skin Integrity  Goal: Skin integrity is maintained or improved  Outcome: Progressing     Problem: Gastrointestinal Irritability  Goal: Nausea and vomiting will be absent or improve  Outcome: Progressing       Patient is not progressing towards the following goals:

## 2022-10-30 NOTE — THERAPY
Physical Therapy   Initial Evaluation     Patient Name: Nerissa Dennis  Age:  42 y.o., Sex:  female  Medical Record #: [id number]  Today's Date: 10/30/2022     Precautions  Precautions: Fall Risk;Weight Bearing As Tolerated Left Lower Extremity;Posterior Hip Precautions;Cervical Collar  ;Spinal / Back Precautions ;Lumbosacral Orthosis;  Comments: c-colar on AAT, LSO donned EOB, LLE posterior hip precautions s/p closed reduction    Assessment  Patient is 42 y.o. female admitted after a large rock fell on her while [place] the [place]. Pt sustained SDH, bilateral nasal bone fxs, C6 SP fracture, bilateral rib fractures, L4 facet/endplate fractures, T12-L3 TP fractures, L posterior hip dislocation, and L distal fibula fracture. All injuries are currently being managed non-op with bracing. Pt had yet to mobilize. Mod assist required for bed mobility via log roll. Min assist with STS and SPT to recliner with report of significant pain in L LE with attempt to weight bear. Pain and weakness noted in R UE which limited use on FWW to offload L LE. PT will cont while pt is in acute care setting.     Additional education provided in the form of post op lumbar spine packet to review spine precautions, brace, and log roll as well as posterior hip precaution handout. Pt encouraged and educated on use of ice and elevation on L ankle as well as gentle ROM exercises to introduce as pain allows. Discussed possibility of dc home with family assist and WC vs rehab. Pt and SO currently interested in pursuing option of rehab at this time.     Plan    Recommend Physical Therapy 4 times per week until therapy goals are met for the following treatments:  Bed Mobility, Gait Training, Neuro Re-Education / Balance, Self Care/Home Evaluation, Stair Training, Therapeutic Activities, and Therapeutic Exercises    DC Equipment Recommendations: Unable to determine at this time  Discharge Recommendations: Recommend post-acute placement for additional  physical therapy services prior to discharge home          10/30/22 0915   Prior Living Situation   Prior Services Home-Independent   Housing / Facility 1 Story House   Steps Into Home 2   Steps In Home 0   Bathroom Set up Walk In Shower;Bathtub / Shower Combination   Equipment Owned None   Lives with - Patient's Self Care Capacity Spouse   Comments father lives close and can help some if needed   Prior Level of Functional Mobility   Bed Mobility Independent   Transfer Status Independent   Ambulation Independent   Distance Ambulation (Feet)   (community)   Assistive Devices Used None   Stairs Independent   Comments working in the [employer]   Cognition    Level of Consciousness Alert   Comments receptive   Active ROM Lower Body    Active ROM Lower Body  X   Comments L ankle ROM limited by pain and edema   Strength Lower Body   Lower Body Strength  X   Comments L LE not assessed due to restrictions and precautions   Sensation Lower Body   Lower Extremity Sensation   WDL   Balance Assessment   Sitting Balance (Static) Fair   Sitting Balance (Dynamic) Fair -   Standing Balance (Static) Poor +   Standing Balance (Dynamic) Poor   Weight Shift Sitting Fair   Weight Shift Standing Poor   Comments FWW, difficulty weightbearing on L LE   Gait Analysis   Gait Level Of Assist Unable to Participate   Weight Bearing Status WBAT LLE   Bed Mobility    Supine to Sit Moderate Assist   Scooting Moderate Assist   Rolling Moderate Assist to Lt.   Comments limited by pain   Functional Mobility   Sit to Stand Minimal Assist   Bed, Chair, Wheelchair Transfer Minimal Assist   Transfer Method Stand Pivot   Mobility transfer to recliner with FWW   Patient / Family Goals    Patient / Family Goal #1 none stated   Short Term Goals    Short Term Goal # 1 pt will be able to compelte supine<>Sitting from flat bed with SPV in 6tx in order to dc home   Short Term Goal # 2 pt will be able to compelte functional transfers with FWW and SPV in 6tx in order  to dc home   Short Term Goal # 3 pt will be able to ambulate 50ft with FWW and SPV in 6tx in order to dc home   Short Term Goal # 4 pt will be able to negotiate 2 steps with min assist in 6tx in order to enter and exit home with spouse   Education Group   Education Provided Role of Physical Therapist;Cervical Precautions;Spine Precautions;Weight Bearing Status   Spine Precautions Patient Response Patient;Acceptance;Explanation;Demonstration;Handout;Verbal Demonstration;Action Demonstration;Reinforcement Needed   Cervical Precautions Patient Response Patient;Acceptance;Explanation;Verbal Demonstration   Role of Physical Therapist Patient Response Patient;Acceptance;Explanation;Verbal Demonstration   Weight Bearing Status Patient Response Patient;Acceptance;Demonstration;Explanation;Verbal Demonstration;Action Demonstration   Brace Wear & Care Patient Response Patient;Acceptance;Demonstration;Action Demonstration   Additional Comments encouraged and educated pt to apply ice to L ankle and perform gentle ROM exercises as well as elevate   Anticipated Discharge Equipment and Recommendations   DC Equipment Recommendations Unable to determine at this time   Discharge Recommendations Recommend post-acute placement for additional physical therapy services prior to discharge home

## 2022-10-30 NOTE — DISCHARGE SUMMARY
Trauma Discharge Summary    DATE OF ADMISSION: 10/28/2022    DATE OF DISCHARGE: 11/2/2022    LENGTH OF STAY: 5 days    ATTENDING PHYSICIAN: Alex Monge M.D.    CONSULTING PHYSICIAN:   1. Dr. Josias Schreiber, neurosurgery  2. Dr. Ministerio Watts, orthopedic surgery    DISCHARGE DIAGNOSIS:  Active Problems:    Closed fracture of multiple ribs of both sides POA: Yes    Fracture of cervical spinous process, initial encounter ([facility]) POA: Yes    Traumatic subdural hemorrhage, initial encounter POA: Yes    Lumbar compression fracture, closed, initial encounter ([facility]) POA: Yes    Hip dislocation, left, initial encounter ([facility]) POA: Yes    Closed fracture of distal end of left fibula POA: Yes    Acute pain of right shoulder POA: Yes    Urinary retention POA: Yes    Trauma POA: Yes    Abnormal finding on lung imaging POA: Yes    Nasal septum fracture, closed, initial encounter POA: Yes    No contraindication to deep vein thrombosis (DVT) prophylaxis POA: Yes    Discharge planning issues POA: Yes  Resolved Problems:    * No resolved hospital problems. *      PROCEDURES: None    HISTORY OF PRESENT ILLNESS: The patient is a 42 y.o. 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 She was transferred to [facility] in [place], [place].    HOSPITAL COURSE: The patient was triaged as a consult activation. She had a subdural hematoma, bilateral nasal bone fractures, C6 spinous process fracture, bilateral rib fractures, L4 facet/endplate fractures, T12-L3 transverse process fractures, left posterior hip dislocation, and left distal fibula fracture.  Dislocation was reduced in the emergency department.  The patient was transported to the trauma intensive care unit.  She was evaluated by Dr. Ministerio Watts with orthopedic surgery.  Posterior hip precautions were continued and her left distal fibula fracture was managed nonoperatively.  She may be weightbearing as  tolerated to the left lower extremity in an ankle splint.  She was evaluated by Dr. Josias Schreiber with neurosurgery and an MRI of her spine was completed.  Her head bleed was managed nonoperatively and a repeat head CT was stable.  She was placed on posttraumatic pharmacologic seizure prophylaxis.  Her C-spine fracture was managed nonoperatively with cervical immobilization.  Her thoracic and L-spine transverse process fractures were managed nonoperatively and she may have an LSO brace when out of bed.  She may don and doff the brace at the edge of bed..  She did have a nasal bone fracture and a facial surgery consult was not indicated.  For her blunt chest trauma she was provided aggressive pulmonary hygiene and a multimodal pain regimen.  She was made sexton status and transferred to the orthospine unit.  An LSO brace was ordered for comfort and the patient mobilized with physical and occupational therapies.  A cognitive evaluation was completed and demonstrates no further acute therapy needs.  Patient was seen by physiatry and an excellent candidate for postacute services.    Date of discharge she is tolerating room air and a regular diet.  She is ambulating with the use of a front wheel walker reporting adequate pain control with the current regimen.  She has been provided education regarding her cervical collar.  She is to wear this at all times and may remove it for hygiene.  She may wear an LSO when out of bed and may don and doff the brace at the edge of bed.     HOSPITAL PROBLEM LIST:  Brachial plexus injury- (present on admission)  Assessment & Plan  Plan film negative for acute fracture.  10/31 MR shoulder completed. Decadron initiated per neurosurgery.  Plan for EMG in 3 weeks if not improving.  Right arm weakness.  Josias Schreiber MD. Neurosurgeon. [facility].    Closed fracture of distal end of left fibula- (present on admission)  Assessment & Plan  Left distal fibula  fracture.  Non-operative management.  Weight bearing status - Weightbearing as tolerated LLE in air splint.  Follow up 2-4 weeks.  Ministerio Watts MD. Orthopedic Surgeon. [facility].    Hip dislocation, left, initial encounter (HCC)- (present on admission)  Assessment & Plan  Left hip dislocation.  Reduced in ED.  Non-operative management.  Weight bearing status - Weightbearing as tolerated LLE.  Posterior hip precautions.  Follow up 2-4 weeks.  Ministerio Watts MD. Orthopedic Surgeon. [facility].    Lumbar compression fracture, closed, initial encounter (HCC)- (present on admission)  Assessment & Plan  Left L4 superior facet fracture. Fracture through superolateral L4 superior endplate. Right T12, L1, L2, and L3 transverse process fractures.  10/29 MR completed.  Non-operative management.  Mobilize as tolerated with bracing. LSO, may don/doff at edge of bed.  Josias Schreiber MD. Neurosurgeon. [facility].    Traumatic subdural hemorrhage, initial encounter- (present on admission)  Assessment & Plan  3.3 mm left posterior subdural hematoma.  mBIG1.  Repeat interval CT imaging brain demonstrated no significant change or progression.  Non-operative management.  Post traumatic pharmacologic seizure prophylaxis for 1 week.  10/31 Speech Language Pathology cognitive evaluation completed, no further needs.  Josias Schreiber MD. Neurosurgeon. [facility].    Fracture of cervical spinous process, initial encounter (HCC)- (present on admission)  Assessment & Plan  C6 spinous process fracture.  10/29 MR with interspinous ligamentous stretch injury, no sign of nerve root injury.  Cervical spine immobilization for 6 weeks.  Josias Schreiber MD. Neurosurgeon. [facility].    Closed fracture of multiple ribs of both sides- (present on admission)  Assessment & Plan  Posterior left 10th and 11th rib fractures. Right posterior third rib fracture. Right posterior 12th rib  fracture at costovertebral junction.  Aggressive pulmonary hygiene and multimodal pain management.    Urinary retention-resolved as of 11/2/2022, (present on admission)  Assessment & Plan  10/28 Beck placed for retention.  11/1 Trial beck removal  Voiding    Discharge planning issues- (present on admission)  Assessment & Plan  Date of admission: 10/28/2022.  10/29 Transfer orders from SICU.  10/30 Rehab referral.  Workman's Comp case.  Cleared for discharge: Yes - Date: 11/1.  Discharge delayed: No.  Discharge date: tbd.    No contraindication to deep vein thrombosis (DVT) prophylaxis- (present on admission)  Assessment & Plan  Prophylactic anticoagulation for thrombotic prevention initially contraindicated secondary to elevated bleeding risk.  10/29 Prophylactic dose enoxaparin initiated.     Nasal septum fracture, closed, initial encounter- (present on admission)  Assessment & Plan  Bilateral nasal bone fractures.  Non operative management.    Abnormal finding on lung imaging- (present on admission)  Assessment & Plan  Incidental finding of 5 mm right lower lobe pulmonary nodule.  Follow up with PCP.    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   Trauma Green Activation.  Alex Bryant MD. Trauma Surgery.        DISPOSITION: Discharged to rehab on 11/2/2022. The patient and family were counseled and questions were answered. Specifically, signs and symptoms of infection, respiratory decompensation, change in condition or worsening condition and persistent or worsening pain were discussed and the patient agrees to seek medical attention if any of these develop.    DISCHARGE MEDICATIONS:  The patients controlled substance history was reviewed and a controlled substance use informed consent (if applicable) was provided by [facility] and the patient has been prescribed.     Medication  List        START taking these medications        Instructions   acetaminophen 325 MG Tabs  Commonly known as: Tylenol   Take 2 Tablets by mouth 4 times a day.  Dose: 650 mg     bisacodyl 10 MG Supp  Commonly known as: DULCOLAX   Insert 1 Suppository into the rectum every 24 hours as needed (if magnesium hydroxide ineffective).  Dose: 10 mg     * dexamethasone 6 MG Tabs  Commonly known as: DECADRON   Take 1 Tablet by mouth every 12 hours.  Dose: 6 mg     * dexamethasone 4 MG Tabs  Start taking on: November 3, 2022  Commonly known as: DECADRON   Take 1 Tablet by mouth every 12 hours.  Dose: 4 mg     * dexamethasone 2 MG tablet  Start taking on: November 4, 2022  Commonly known as: DECADRON   Take 1 Tablet by mouth every 12 hours.  Dose: 2 mg     * dexamethasone 2 MG tablet  Start taking on: November 6, 2022  Commonly known as: DECADRON   Take 1 Tablet by mouth every day.  Dose: 2 mg     docusate sodium 100 MG Caps   Take 100 mg by mouth 2 times a day.  Dose: 100 mg     enoxaparin 30 MG/0.3ML Sosy inj  Commonly known as: Lovenox   Inject 0.3 mL under the skin every 12 hours.  Dose: 30 mg     gabapentin 300 MG Caps  Commonly known as: NEURONTIN   Take 1 Capsule by mouth every 8 hours.  Dose: 300 mg     levETIRAcetam 500 MG Tabs  Commonly known as: KEPPRA   Take 1 Tablet by mouth 2 times a day for 3 days.  Dose: 500 mg     lidocaine 5 % Ptch  Start taking on: November 3, 2022  Commonly known as: LIDODERM   Place 1-3 Patches on the skin every 24 hours.  Dose: 1-3 Patch     magnesium hydroxide 400 MG/5ML Susp  Start taking on: November 3, 2022  Commonly known as: MILK OF MAGNESIA   Take 30 mL by mouth every day.  Dose: 30 mL     metaxalone 800 MG Tabs  Commonly known as: Skelaxin   Take 1 Tablet by mouth 3 times a day.  Dose: 800 mg     ondansetron 4 MG Tbdp  Commonly known as: ZOFRAN ODT   Take 1 Tablet by mouth every four hours as needed for Nausea.  Dose: 4 mg     oxyCODONE immediate-release 5 MG Tabs  Commonly known  as: ROXICODONE   Take 1 Tablet by mouth every 3 hours as needed for Severe Pain for up to 7 days.  Dose: 5 mg     polyethylene glycol/lytes 17 g Pack  Commonly known as: MIRALAX   Take 1 Packet by mouth 2 times a day.  Dose: 17 g     * senna-docusate 8.6-50 MG Tabs  Commonly known as: PERICOLACE or SENOKOT S   Take 1 Tablet by mouth every evening.  Dose: 1 Tablet     * senna-docusate 8.6-50 MG Tabs  Commonly known as: PERICOLACE or SENOKOT S   Take 1 Tablet by mouth every 24 hours as needed for Constipation.  Dose: 1 Tablet     sodium phosphate 7-19 GM/118ML Enem   Insert 133 mL into the rectum one time as needed (if bisacodyl ineffective).  Dose: 1 Each           * This list has 6 medication(s) that are the same as other medications prescribed for you. Read the directions carefully, and ask your doctor or other care provider to review them with you.                STOP taking these medications      Aleve 220 MG tablet  Generic drug: naproxen     modafinil 200 MG Tabs  Commonly known as: PROVIGIL              ACTIVITY:  Must wear cervical collar at all times. WBAT in LLE air splint until by orthopedics    WOUND CARE:  Topical ABX ointment twice a day for 7 days    DIET:  Orders Placed This Encounter   Procedures    Diet Order Diet: Regular     Standing Status:   Standing     Number of Occurrences:   1     Order Specific Question:   Diet:     Answer:   Regular [1]       FOLLOW UP:  Josias Schreiber M.D.  [address]  [facility] [address]  [phone]    Schedule an appointment as soon as possible for a visit in 6 week(s)  Continue cervical collar at all times, LSO when out of bed (may don/doff at edge of bed).    Ministerio Watts M.D.  [address]  [address]  [facility] [id number] [phone]    Schedule an appointment as soon as possible for a visit in 4 week(s)  May continue weightbearing as tolerated to the left lower extremity in an ankle splint. Continue left posterior hip precautions.    Primary  Care Provider    Schedule an appointment as soon as possible for a visit  Follow up with primary care provider regarding incidental finding of small pulmonary nodule.    TIME SPENT ON DISCHARGE: 35 minutes      ____________________________________________  PATRICIA Barry    DD: 10/30/2022 12:06 PM

## 2022-10-30 NOTE — DISCHARGE PLANNING
Case Management Discharge Planning    Admission Date: 10/28/2022  GMLOS: 4.3  ALOS: 2    6-Clicks ADL Score: 13  6-Clicks Mobility Score: 8  PT and/or OT Eval ordered: Yes  Post-acute Referrals Ordered: Yes  Post-acute Choice Obtained: Yes  Has referral(s) been sent to post-acute provider:  Yes      Anticipated Discharge Dispo: Discharge Disposition: Disch to  rehab facility or distinct part unit (62).  Patient wants to research rehab facilities.  Advised Rehab referral placed by MD but patient has choice.      Home address verified as correct.   Discharge Address: [address] [place]  [address]  Discharge Contact Phone Number: [phone]    DME Needed: No, Unknown at this time.   DME Ordered: No    Action(s) Taken: DC Assessment Complete (See below)    Escalations Completed: None    Medically Clear: No    Next Steps: Pending patient's progress.  Anticipate Acute Rehab prior to returning home.  CM will follow.     Barriers to Discharge: Medical clearance    Is the patient up for discharge tomorrow: No

## 2022-10-30 NOTE — PROGRESS NOTES
Neurosurgery Progress Note    Subjective:  No events    bedside   Feels sore all over   Sitting in chair bedside, C collar and LSO     Exam:  AAO, fluent speech   Facial abrasions   3 PERRL, EOMI   RUE  2/5, tricep/bicep 2/5, deltoid 3/5   Stated numb before the accident, continues    LUE /tricep/bicep 4/5   RLE 5/5  LLE IP/quad 5/5, pf/df 4/5 Left ankle brace   Cast[place]     BP  Min: 97/62  Max: 117/89  Pulse  Av.2  Min: 68  Max: 86  Resp  Av  Min: 16  Max: 24  Temp  Av.3 °C (97.3 °F)  Min: 36.1 °C (97 °F)  Max: 36.7 °C (98 °F)  Monitored Temp 2  Av.9 °C (98.4 °F)  Min: 36.9 °C (98.4 °F)  Max: 36.9 °C (98.4 °F)  SpO2  Av.3 %  Min: 91 %  Max: 97 %    No data recorded    Recent Labs     10/28/22  0451 10/29/22  0507 10/30/22  0643   WBC 19.2* 5.8 5.9   RBC 4.34 3.46* 3.48*   HEMOGLOBIN 14.2 11.5* 11.3*   HEMATOCRIT 42.1 34.6* 34.7*   MCV 97.0 100.0* 99.7*   MCH 32.7 33.2* 32.5   MCHC 33.7 33.2* 32.6*   RDW 42.3 44.4 43.1   PLATELETCT 183 204 190   MPV 9.7 9.0 8.9*     Recent Labs     10/28/22  0451 10/29/22  0507 10/30/22  0643   SODIUM 135 138 138   POTASSIUM 3.5* 3.9 3.9   CHLORIDE 102 105 102   CO2 20 24 26   GLUCOSE 193* 103* 87   BUN 17 12 10   CREATININE 0.70 0.77 0.67   CALCIUM 8.6 8.3* 8.4*     Recent Labs     10/28/22  0712   APTT 23.5*   INR 1.10           Intake/Output                         10/29/22 0700 - 10/30/22 0659 10/30/22 07 - 10/31/22 0659     1900-0659 Total  Total                 Intake    P.O.  --  240 240  --  -- --    P.O. -- 240 240 -- -- --    I.V.  471.5  -- 471.5  --  -- --    Volume (mL) (LR infusion) 471.5 -- 471.5 -- -- --    Total Intake 471.5 240 711.5 -- -- --       Output    Urine  850  1280 2130  --  -- --    Output (mL) (Urethral Catheter) 850 1280 2130 -- -- --    Total Output 850 1280 2130 -- -- --       Net I/O     -378.5 -1040 -1418.5 -- -- --              Intake/Output Summary (Last 24 hours) at 10/30/2022  1223  Last data filed at 10/30/2022 0528  Gross per 24 hour   Intake 258.17 ml   Output 1730 ml   Net -1471.83 ml             gabapentin  300 mg Q8HRS    [START ON 10/31/2022] lidocaine  1-3 Patch Q24HRS    levETIRAcetam  500 mg BID    Respiratory Therapy Consult   Continuous RT    Pharmacy Consult Request  1 Each PHARMACY TO DOSE    ondansetron  4 mg Q4HRS PRN    docusate sodium  100 mg BID    senna-docusate  1 Tablet Nightly    senna-docusate  1 Tablet Q24HRS PRN    polyethylene glycol/lytes  1 Packet BID    bisacodyl  10 mg Q24HRS PRN    sodium phosphate  1 Each Once PRN    enoxaparin (LOVENOX) injection  30 mg Q12HRS    oxyCODONE immediate-release  5 mg Q3HRS PRN    Or    oxyCODONE immediate-release  10 mg Q3HRS PRN    metaxalone  800 mg TID    acetaminophen  650 mg 4X/DAY       Assessment and Plan:  Hospital day #2  POD #NA   Prophylactic anticoagulation: no         Start date/time: TBD     Plan:  Stable neuro  MRI Cspine  reviewed by Dr. Camarena-stable   Cont. C collar at all times   LSO when OOB   PT/OT   Pain control per trauma   Cont Keppra

## 2022-10-30 NOTE — PROGRESS NOTES
Trauma / Surgical Daily Progress Note    Date of Service  10/30/2022    Chief Complaint  42 y.o. 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    Interval Events  Transfer from SICU to [facility]  Modest pain control, hasn't mobilized yet, no appetite, beck placed for retention    - Gabapentin increased  - LSO ordered  - May mobilize with staff  - PT/OT  - SLP for cog eval  - Shower  - Continue beck for now  - Physiatry consulted ordered, Workman's Comp case    Review of Systems  Review of Systems   Constitutional:  Positive for malaise/fatigue. Negative for chills and fever.   HENT:  Negative for hearing loss.    Eyes:  Negative for blurred vision and double vision.   Respiratory:  Negative for shortness of breath.    Cardiovascular:  Negative for chest pain.   Gastrointestinal:  Positive for constipation (BM prior to arrival). Negative for abdominal pain, nausea and vomiting.   Musculoskeletal:  Positive for back pain, joint pain, myalgias and neck pain.   Skin:  Negative for rash.   Neurological:  Positive for sensory change (RUE). Negative for dizziness, tingling, speech change, focal weakness and headaches.      Vital Signs  Temp:  [36.1 °C (97 °F)] 36.1 °C (97 °F)  Pulse:  [68-86] 75  Resp:  [11-24] 18  BP: ()/(53-89) 102/67  SpO2:  [91 %-96 %] 95 %    Physical Exam  Physical Exam  Vitals and nursing note reviewed.   Constitutional:       General: She is awake. She is not in acute distress.     Appearance: She is well-developed. She is not ill-appearing.      Interventions: Cervical collar in place.   HENT:      Head: Normocephalic.      Comments: Scattered facial abrasions     Nose: Signs of injury and nasal tenderness present.       Mouth/Throat:      Mouth: Mucous membranes are moist.      Pharynx: Oropharynx is clear.   Eyes:      Pupils: Pupils are equal, round, and reactive to light.   Pulmonary:      Effort: Pulmonary effort is normal. No respiratory distress.   Chest:      Chest wall: Tenderness present.   Musculoskeletal:         General: Swelling, tenderness and signs of injury present.      Left lower leg: Edema present.      Comments: Moves all extremities   Skin:     General: Skin is warm and dry.      Capillary Refill: Capillary refill takes less than 2 seconds.      Comments: Scattered abrasions and ecchymosis   Neurological:      Mental Status: She is alert.      GCS: GCS eye subscore is 4. GCS verbal subscore is 5. GCS motor subscore is 6.   Psychiatric:         Mood and Affect: Mood normal. Affect is flat.         Behavior: Behavior normal. Behavior is cooperative.       Laboratory  Recent Results (from the past 24 hour(s))   CBC WITH DIFFERENTIAL    Collection Time: 10/30/22  6:43 AM   Result Value Ref Range    WBC 5.9 4.8 - 10.8 K/uL    RBC 3.48 (L) 4.20 - 5.40 M/uL    Hemoglobin 11.3 (L) 12.0 - 16.0 g/dL    Hematocrit 34.7 (L) 37.0 - 47.0 %    MCV 99.7 (H) 81.4 - 97.8 fL    MCH 32.5 27.0 - 33.0 pg    MCHC 32.6 (L) 33.6 - 35.0 g/dL    RDW 43.1 35.9 - 50.0 fL    Platelet Count 190 164 - 446 K/uL    MPV 8.9 (L) 9.0 - 12.9 fL    Neutrophils-Polys 68.10 44.00 - 72.00 %    Lymphocytes 21.70 (L) 22.00 - 41.00 %    Monocytes 6.70 0.00 - 13.40 %    Eosinophils 2.70 0.00 - 6.90 %    Basophils 0.30 0.00 - 1.80 %    Immature Granulocytes 0.50 0.00 - 0.90 %    Nucleated RBC 0.00 /100 WBC    Neutrophils (Absolute) 3.98 2.00 - 7.15 K/uL    Lymphs (Absolute) 1.27 1.00 - 4.80 K/uL    Monos (Absolute) 0.39 0.00 - 0.85 K/uL    Eos (Absolute) 0.16 0.00 - 0.51 K/uL    Baso (Absolute) 0.02 0.00 - 0.12 K/uL    Immature Granulocytes (abs) 0.03 0.00 - 0.11 K/uL    NRBC (Absolute) 0.00 K/uL   Basic Metabolic Panel    Collection Time: 10/30/22   6:43 AM   Result Value Ref Range    Sodium 138 135 - 145 mmol/L    Potassium 3.9 3.6 - 5.5 mmol/L    Chloride 102 96 - 112 mmol/L    Co2 26 20 - 33 mmol/L    Glucose 87 65 - 99 mg/dL    Bun 10 8 - 22 mg/dL    Creatinine 0.67 0.50 - 1.40 mg/dL    Calcium 8.4 (L) 8.5 - 10.5 mg/dL    Anion Gap 10.0 7.0 - 16.0   ESTIMATED GFR    Collection Time: 10/30/22  6:43 AM   Result Value Ref Range    GFR (CKD-EPI) 112 >60 mL/min/1.73 m 2       Fluids    Intake/Output Summary (Last 24 hours) at 10/30/2022 0843  Last data filed at 10/30/2022 0528  Gross per 24 hour   Intake 511.51 ml   Output 2005 ml   Net -1493.49 ml       Core Measures & Quality Metrics  Labs reviewed, Medications reviewed and Radiology images reviewed  Castillo catheter: Urinary Tract Retention or Urinary Tract Obstruction      DVT Prophylaxis: Enoxaparin (Lovenox)  DVT prophylaxis - mechanical: SCDs  Ulcer prophylaxis: Not indicated    Assessed for rehab: Patient was assess for and/or received rehabilitation services during this hospitalization  RAP Score Total: 9  CAGE Results: negative Blood Alcohol>0.08: no     Assessment/Plan  Lumbar compression fracture, closed, initial encounter ([facility])- (present on admission)  Assessment & Plan  Left L4 superior facet fracture. Fracture through superolateral L4 superior endplate. Right T12, L1, L2, and L3 transverse process fractures.  10/29 MR completed, awaiting neurosurgery review.  Non-operative management.  Mobilize as tolerated with bracing. LSO, may don/doff at edge of bed.  Josias Schreiber MD. Neurosurgeon. [facility].    Traumatic subdural hemorrhage, initial encounter- (present on admission)  Assessment & Plan  3.3 mm left posterior subdural hematoma.  mBIG1.  Repeat interval CT imaging brain demonstrated no significant change or progression.  Non-operative management.  Post traumatic pharmacologic seizure prophylaxis for 1 week.  Speech Language Pathology cognitive evaluation.  Josias Schreiber MD.  Neurosurgeon. [facility].    Fracture of cervical spinous process, initial encounter (HCC)- (present on admission)  Assessment & Plan  C6 spinous process fracture.  10/29 MR completed, awaiting neurosurgery review.  Cervical spine immobilization.  Josias Schreiber MD. Neurosurgeon. [facility].    Closed fracture of multiple ribs of both sides- (present on admission)  Assessment & Plan  Posterior left 10th and 11th rib fractures. Right posterior third rib fracture. Right posterior 12th rib fracture at costovertebral junction.  Aggressive pulmonary hygiene and multimodal pain management.    Urinary retention- (present on admission)  Assessment & Plan  10/28 Castillo placed for retention.  Continue Castillo until mobilizing more.    Acute pain of right shoulder- (present on admission)  Assessment & Plan  Right shoulder pain/numbness. Motor function intact.  Plain film negative for fracture.  Analgesia.    Closed fracture of distal end of left fibula- (present on admission)  Assessment & Plan  Left distal fibula fracture.  Non-operative management.  Weight bearing status - Weightbearing as tolerated LLE in air splint.  Follow up 2-4 weeks.  Ministerio Watts MD. Orthopedic Surgeon. [facility].    Hip dislocation, left, initial encounter (HCC)- (present on admission)  Assessment & Plan  Left hip dislocation.  Reduced in ED.  Non-operative management.  Weight bearing status - Weightbearing as tolerated LLE.  Posterior hip precautions.  Follow up 2-4 weeks.  Ministerio Watts MD. Orthopedic Surgeon. [facility].    Discharge planning issues- (present on admission)  Assessment & Plan  Date of admission: 10/28/2022.  10/29 Transfer orders from SICU.  10/30 Rehab referral.  Workman's Comp case.  Cleared for discharge: No.  Discharge delayed: No.  Discharge date: tbd.    No contraindication to deep vein thrombosis (DVT) prophylaxis- (present on admission)  Assessment &  Plan  Prophylactic anticoagulation for thrombotic prevention initially contraindicated secondary to elevated bleeding risk.  10/29 Prophylactic dose enoxaparin initiated.     Nasal septum fracture, closed, initial encounter- (present on admission)  Assessment & Plan  Bilateral nasal bone fractures.  Non operative management.    Abnormal finding on lung imaging- (present on admission)  Assessment & Plan  Incidental finding of 5 mm right lower lobe pulmonary nodule.  Follow up with PCP.    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   Trauma Green Activation.  Alex Bryant MD. Trauma Surgery.      Discussed patient condition with RN, , , Patient, and trauma surgery. Dr. Monge

## 2022-10-30 NOTE — DISCHARGE PLANNING
Care Transition Team Assessment    Information Source  Orientation Level: Oriented X4  Information Given By: Patient  Informant's Name: BONITA BURDEN  Who is responsible for making decisions for patient? : Patient    Readmission Evaluation  Is this a readmission?: No    Elopement Risk  Legal Hold: No  Ambulatory or Self Mobile in Wheelchair: No-Not an Elopement Risk  Disoriented: No  Psychiatric Symptoms: None  History of Wandering: No  Elopement this Admit: No  Vocalizing Wanting to Leave: No  Displays Behaviors, Body Language Wanting to Leave: No-Not at Risk for Elopement  Elopement Risk: Not at Risk for Elopement    Interdisciplinary Discharge Planning  Primary Care Physician: MARYBETH MAJANO IN [place].  Lives with - Patient's Self Care Capacity: Spouse  Patient or legal guardian wants to designate a caregiver: No  Support Systems: Spouse / Significant Other  Housing / Facility: [address] [facility]  Prior Services: None  Patient Prefers to be Discharged to:: Rehab but wants to research facilities.  Assistance Needed: Unknown at this Time  Durable Medical Equipment: Not Applicable    Discharge Preparedness  What is your plan after discharge?: Uncertain - pending medical team collaboration  What are your discharge supports?: Spouse  Prior Functional Level: Ambulatory    Functional Assesment  Prior Functional Level: Ambulatory         Vision / Hearing Impairment  Vision Impairment : No  Hearing Impairment : No         Advance Directive  Advance Directive?: None    Domestic Abuse  Have you ever been the victim of abuse or violence?: No  Physical Abuse or Sexual Abuse: No  Verbal Abuse or Emotional Abuse: No  Possible Abuse/Neglect Reported to:: Not Applicable              Anticipated Discharge Information  Discharge Disposition: Disch to  rehab facility or distinct part unit (62)  Discharge Address: [address] [place] [address]  [address]  Discharge Contact Phone Number: [phone]

## 2022-10-30 NOTE — THERAPY
"Occupational Therapy   Initial Evaluation     Patient Name: Nerissa Dennis  Age:  42 y.o., Sex:  female  Medical Record #: [id number]  Today's Date: 10/30/2022     Precautions: Fall Risk, Lumbosacral Orthosis, Left Ankle Splint, Cervical Collar, Posterior Hip Precautions, Weight Bearing As Tolerated Left Lower Extremity    Assessment    Patient is 42 y.o. 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 Reviewed PHP, neutral spine and weight bearing precautions, brace wearing instructions and provided education on compensatory strategies for ADLs including equipment recommendations. Information packets provided for reference of education. Pt assisted to bedside chair, required extra time and cues for breathing strategies to minimize pain. Pt spouse at bedside reports hope to bring pt home following acute stay, provided spouse DME recommendations to submit to worker's comp claim.    Plan    Recommend Occupational Therapy 4 times per week until therapy goals are met for the following treatments:  Adaptive Equipment, Self Care/Activities of Daily Living, Therapeutic Activities, and Therapeutic Exercises.    DC Equipment Recommendations: Tub / Shower Seat, Bed Side Commode, Other: hip kit  Discharge Recommendations: Recommend post-acute placement for additional occupational therapy services prior to discharge home (will update recommendation pending progress in acute setting, pt prefers to DC home)     Subjective    \"I am so stiff from laying in that bed.\"     Objective       10/30/22 0921   Prior Living Situation   Prior Services None   Housing / Facility [address]   Steps Into " "Home 2   Steps In Home 0   Bathroom Set up Walk In Shower;Bathtub / Shower Combination   Equipment Owned None   Lives with - Patient's Self Care Capacity Spouse   Prior Level of ADL Function   Self Feeding Independent   Grooming / Hygiene Independent   Bathing Independent   Dressing Independent   Toileting Independent   Prior Level of IADL Function   Medication Management Independent   Laundry Independent   Kitchen Mobility Independent   Finances Independent   Home Management Independent   Shopping Independent   Prior Level Of Mobility Independent Without Device in Community   Driving / Transportation Driving Independent   Occupation (Pre-Hospital Vocational) Employed Full Time  ([employer] near [employer])   Precautions   Precautions Fall Risk   Pain 0 - 10 Group   Therapist Pain Assessment During Activity;Nurse Notified  (\"everywhere\" low back, ribs, L ankle and hip)   Cognition    Level of Consciousness Alert   Strength Upper Body   Upper Body Strength  X   Comments RUE limited by pain   Balance Assessment   Sitting Balance (Static) Fair   Sitting Balance (Dynamic) Fair -   Standing Balance (Static) Poor +   Standing Balance (Dynamic) Poor   Weight Shift Sitting Fair   Weight Shift Standing Poor   Comments FWW in standing   Bed Mobility    Supine to Sit Moderate Assist   Scooting Maximal Assist   Rolling Moderate Assist to Lt.   Comments unable to roll to R d/t pain   ADL Assessment   Eating Supervision   Grooming Maximal Assist  (hair care)   Upper Body Dressing Maximal Assist   Lower Body Dressing Maximal Assist   Comments edu regarding AE to ADLs d/t PHP   How much help from another person does the patient currently need...   Putting on and taking off regular lower body clothing? 2   Bathing (including washing, rinsing, and drying)? 2   Toileting, which includes using a toilet, bedpan, or urinal? 2   Putting on and taking off regular upper body clothing? 2   Taking care of personal grooming such as brushing teeth? 2 " "  Eating meals? 3   6 Clicks Daily Activity Score 13   Functional Mobility   Sit to Stand Minimal Assist   Bed, Chair, Wheelchair Transfer Minimal Assist   Transfer Method Stand Pivot   Mobility w/FWW   Activity Tolerance   Sitting in Chair 10+min   Sitting Edge of Bed 6min   Standing transfer only   Patient / Family Goals   Patient / Family Goal #1 \"I want to get cleaned up\"   Short Term Goals   Short Term Goal # 1 pt will demo functional transfers at SPV level   Short Term Goal # 2 pt will demo LB dress with AE in order to maintain PHP without cues   Short Term Goal # 3 pt will demo toileting ADL with SPV   Education Group   Education Provided Home Safety;Role of Occupational Therapist;Hip Precautions;Brace Wear and Care;Weight Bearing Precautions;Adaptive Equipment;Activities of Daily Living   Role of Occupational Therapist Patient Response Patient;Significant Other;Acceptance;Explanation;Verbal Demonstration   Hip Precautions Patient Response Patient;Significant Other;Acceptance;Explanation;Verbal Demonstration;Handout   Brace Wear & Care Patient Response Patient;Significant Other;Acceptance;Explanation;Verbal Demonstration;Handout   Home Safety Patient Response Patient;Significant Other;Acceptance;Explanation;Verbal Demonstration;Handout   ADL Patient Response Patient;Significant Other;Acceptance;Explanation;Verbal Demonstration;Handout   Weight Bearing Precautions Patient Response Patient;Significant Other;Acceptance;Explanation;Verbal Demonstration   Problem List   Problem List Decreased Homemaking Skills;Decreased Active Daily Living Skills;Decreased Upper Extremity Strength Right;Decreased Upper Extremity AROM Right;Decreased Functional Mobility;Decreased Activity Tolerance;Impaired Coordination Right Upper Extremity;Limited Knowledge of Post Op Precautions   Anticipated Discharge Equipment and Recommendations   DC Equipment Recommendations Tub / Shower Seat;Bed Side Commode;Other (Comments)  (hip kit) "   Discharge Recommendations Recommend post-acute placement for additional occupational therapy services prior to discharge home  (will update recommendation pending progress in acute setting, pt prefers to DC home)

## 2022-10-30 NOTE — ASSESSMENT & PLAN NOTE
Date of admission: 10/28/2022.  10/29 Transfer orders from SICU.  10/30 Rehab referral.  Workman's Comp case.  Cleared for discharge: Yes - Date: 11/1.  Discharge delayed: No.  Discharge date: tbd.

## 2022-10-31 ENCOUNTER — APPOINTMENT (OUTPATIENT)
Dept: RADIOLOGY | Facility: MEDICAL CENTER | Age: 42
DRG: 964 | End: 2022-10-31
Attending: NURSE PRACTITIONER
Payer: COMMERCIAL

## 2022-10-31 PROBLEM — R29.898 WEAKNESS OF RIGHT UPPER EXTREMITY: Status: ACTIVE | Noted: 2022-10-31

## 2022-10-31 LAB
ANION GAP SERPL CALC-SCNC: 10 MMOL/L (ref 7–16)
BASOPHILS # BLD AUTO: 0.8 % (ref 0–1.8)
BASOPHILS # BLD: 0.03 K/UL (ref 0–0.12)
BUN SERPL-MCNC: 11 MG/DL (ref 8–22)
CALCIUM SERPL-MCNC: 8.4 MG/DL (ref 8.5–10.5)
CHLORIDE SERPL-SCNC: 102 MMOL/L (ref 96–112)
CO2 SERPL-SCNC: 25 MMOL/L (ref 20–33)
CREAT SERPL-MCNC: 0.71 MG/DL (ref 0.5–1.4)
EOSINOPHIL # BLD AUTO: 0.2 K/UL (ref 0–0.51)
EOSINOPHIL NFR BLD: 5.1 % (ref 0–6.9)
ERYTHROCYTE [DISTWIDTH] IN BLOOD BY AUTOMATED COUNT: 44 FL (ref 35.9–50)
GFR SERPLBLD CREATININE-BSD FMLA CKD-EPI: 109 ML/MIN/1.73 M 2
GLUCOSE SERPL-MCNC: 109 MG/DL (ref 65–99)
HCT VFR BLD AUTO: 33 % (ref 37–47)
HGB BLD-MCNC: 10.7 G/DL (ref 12–16)
IMM GRANULOCYTES # BLD AUTO: 0.01 K/UL (ref 0–0.11)
IMM GRANULOCYTES NFR BLD AUTO: 0.3 % (ref 0–0.9)
LYMPHOCYTES # BLD AUTO: 1.39 K/UL (ref 1–4.8)
LYMPHOCYTES NFR BLD: 35.2 % (ref 22–41)
MAGNESIUM SERPL-MCNC: 2 MG/DL (ref 1.5–2.5)
MCH RBC QN AUTO: 32.2 PG (ref 27–33)
MCHC RBC AUTO-ENTMCNC: 32.4 G/DL (ref 33.6–35)
MCV RBC AUTO: 99.4 FL (ref 81.4–97.8)
MONOCYTES # BLD AUTO: 0.38 K/UL (ref 0–0.85)
MONOCYTES NFR BLD AUTO: 9.6 % (ref 0–13.4)
NEUTROPHILS # BLD AUTO: 1.94 K/UL (ref 2–7.15)
NEUTROPHILS NFR BLD: 49 % (ref 44–72)
NRBC # BLD AUTO: 0 K/UL
NRBC BLD-RTO: 0 /100 WBC
PHOSPHATE SERPL-MCNC: 4.1 MG/DL (ref 2.5–4.5)
PLATELET # BLD AUTO: 204 K/UL (ref 164–446)
PMV BLD AUTO: 8.9 FL (ref 9–12.9)
POTASSIUM SERPL-SCNC: 3.7 MMOL/L (ref 3.6–5.5)
RBC # BLD AUTO: 3.32 M/UL (ref 4.2–5.4)
SODIUM SERPL-SCNC: 137 MMOL/L (ref 135–145)
WBC # BLD AUTO: 4 K/UL (ref 4.8–10.8)

## 2022-10-31 PROCEDURE — 84100 ASSAY OF PHOSPHORUS: CPT

## 2022-10-31 PROCEDURE — 770001 HCHG ROOM/CARE - MED/SURG/GYN PRIV*

## 2022-10-31 PROCEDURE — 80048 BASIC METABOLIC PNL TOTAL CA: CPT

## 2022-10-31 PROCEDURE — A9270 NON-COVERED ITEM OR SERVICE: HCPCS | Performed by: SURGERY

## 2022-10-31 PROCEDURE — 83735 ASSAY OF MAGNESIUM: CPT

## 2022-10-31 PROCEDURE — A9270 NON-COVERED ITEM OR SERVICE: HCPCS | Performed by: NURSE PRACTITIONER

## 2022-10-31 PROCEDURE — 36415 COLL VENOUS BLD VENIPUNCTURE: CPT

## 2022-10-31 PROCEDURE — 700111 HCHG RX REV CODE 636 W/ 250 OVERRIDE (IP): Performed by: NEUROLOGICAL SURGERY

## 2022-10-31 PROCEDURE — 99232 SBSQ HOSP IP/OBS MODERATE 35: CPT | Performed by: NURSE PRACTITIONER

## 2022-10-31 PROCEDURE — 700102 HCHG RX REV CODE 250 W/ 637 OVERRIDE(OP): Performed by: SURGERY

## 2022-10-31 PROCEDURE — 92523 SPEECH SOUND LANG COMPREHEN: CPT

## 2022-10-31 PROCEDURE — 85025 COMPLETE CBC W/AUTO DIFF WBC: CPT

## 2022-10-31 PROCEDURE — 700102 HCHG RX REV CODE 250 W/ 637 OVERRIDE(OP): Performed by: NURSE PRACTITIONER

## 2022-10-31 PROCEDURE — 99255 IP/OBS CONSLTJ NEW/EST HI 80: CPT | Performed by: PHYSICAL MEDICINE & REHABILITATION

## 2022-10-31 PROCEDURE — 700111 HCHG RX REV CODE 636 W/ 250 OVERRIDE (IP): Performed by: SURGERY

## 2022-10-31 PROCEDURE — 99233 SBSQ HOSP IP/OBS HIGH 50: CPT | Performed by: NURSE PRACTITIONER

## 2022-10-31 PROCEDURE — 73221 MRI JOINT UPR EXTREM W/O DYE: CPT | Mod: RT

## 2022-10-31 RX ORDER — DEXAMETHASONE SODIUM PHOSPHATE 4 MG/ML
4 INJECTION, SOLUTION INTRA-ARTICULAR; INTRALESIONAL; INTRAMUSCULAR; INTRAVENOUS; SOFT TISSUE EVERY 6 HOURS
Status: DISCONTINUED | OUTPATIENT
Start: 2022-10-31 | End: 2022-11-01

## 2022-10-31 RX ADMIN — METAXALONE 800 MG: 800 TABLET ORAL at 04:24

## 2022-10-31 RX ADMIN — OXYCODONE 5 MG: 5 TABLET ORAL at 12:06

## 2022-10-31 RX ADMIN — DEXAMETHASONE SODIUM PHOSPHATE 4 MG: 4 INJECTION, SOLUTION INTRA-ARTICULAR; INTRALESIONAL; INTRAMUSCULAR; INTRAVENOUS; SOFT TISSUE at 23:02

## 2022-10-31 RX ADMIN — OXYCODONE 5 MG: 5 TABLET ORAL at 15:21

## 2022-10-31 RX ADMIN — ENOXAPARIN SODIUM 30 MG: 30 INJECTION SUBCUTANEOUS at 04:24

## 2022-10-31 RX ADMIN — SENNOSIDES AND DOCUSATE SODIUM 1 TABLET: 50; 8.6 TABLET ORAL at 22:59

## 2022-10-31 RX ADMIN — METAXALONE 800 MG: 800 TABLET ORAL at 13:41

## 2022-10-31 RX ADMIN — DEXAMETHASONE SODIUM PHOSPHATE 4 MG: 4 INJECTION, SOLUTION INTRA-ARTICULAR; INTRALESIONAL; INTRAMUSCULAR; INTRAVENOUS; SOFT TISSUE at 17:29

## 2022-10-31 RX ADMIN — ENOXAPARIN SODIUM 30 MG: 30 INJECTION SUBCUTANEOUS at 17:29

## 2022-10-31 RX ADMIN — METAXALONE 800 MG: 800 TABLET ORAL at 17:29

## 2022-10-31 RX ADMIN — LEVETIRACETAM 500 MG: 500 TABLET, FILM COATED ORAL at 17:29

## 2022-10-31 RX ADMIN — GABAPENTIN 300 MG: 300 CAPSULE ORAL at 23:02

## 2022-10-31 RX ADMIN — ACETAMINOPHEN 650 MG: 325 TABLET, FILM COATED ORAL at 22:59

## 2022-10-31 RX ADMIN — OXYCODONE HYDROCHLORIDE 10 MG: 10 TABLET ORAL at 09:00

## 2022-10-31 RX ADMIN — GABAPENTIN 300 MG: 300 CAPSULE ORAL at 13:41

## 2022-10-31 RX ADMIN — ACETAMINOPHEN 650 MG: 325 TABLET, FILM COATED ORAL at 13:41

## 2022-10-31 RX ADMIN — ACETAMINOPHEN 650 MG: 325 TABLET, FILM COATED ORAL at 17:29

## 2022-10-31 RX ADMIN — POLYETHYLENE GLYCOL 3350 1 PACKET: 17 POWDER, FOR SOLUTION ORAL at 04:24

## 2022-10-31 RX ADMIN — ACETAMINOPHEN 650 MG: 325 TABLET, FILM COATED ORAL at 08:59

## 2022-10-31 RX ADMIN — GABAPENTIN 300 MG: 300 CAPSULE ORAL at 04:24

## 2022-10-31 RX ADMIN — POLYETHYLENE GLYCOL 3350 1 PACKET: 17 POWDER, FOR SOLUTION ORAL at 17:27

## 2022-10-31 RX ADMIN — DOCUSATE SODIUM 100 MG: 100 CAPSULE, LIQUID FILLED ORAL at 17:27

## 2022-10-31 RX ADMIN — LEVETIRACETAM 500 MG: 500 TABLET, FILM COATED ORAL at 04:24

## 2022-10-31 RX ADMIN — DOCUSATE SODIUM 100 MG: 100 CAPSULE, LIQUID FILLED ORAL at 04:24

## 2022-10-31 RX ADMIN — OXYCODONE 5 MG: 5 TABLET ORAL at 23:11

## 2022-10-31 ASSESSMENT — PAIN DESCRIPTION - PAIN TYPE
TYPE: ACUTE PAIN

## 2022-10-31 ASSESSMENT — ENCOUNTER SYMPTOMS
ABDOMINAL PAIN: 0
SENSORY CHANGE: 1
CONSTIPATION: 1
SPEECH CHANGE: 0
DIZZINESS: 0
CHILLS: 0
VOMITING: 0
DOUBLE VISION: 0
FOCAL WEAKNESS: 1
NECK PAIN: 1
TINGLING: 0
MYALGIAS: 1
NAUSEA: 0
BLURRED VISION: 0
SHORTNESS OF BREATH: 0
FEVER: 0
HEADACHES: 0
BACK PAIN: 1

## 2022-10-31 NOTE — DISCHARGE PLANNING
Case Management Discharge Planning    Admission Date: 10/28/2022  GMLOS: 4.3  ALOS: 3    6-Clicks ADL Score: 13  6-Clicks Mobility Score: 8  PT and/or OT Eval ordered: Yes  Post-acute Referrals Ordered: Yes  Post-acute Choice Obtained: Yes  Has referral(s) been sent to post-acute provider:  Yes      Anticipated Discharge Dispo: Discharge Disposition: Disch to  rehab facility or distinct part unit (62)  Discharge Address: [address] [place], [place] [address]  [address]  Discharge Contact Phone Number: [phone]    DME Needed: No    Action(s) Taken: Updated Provider/Nurse on Discharge Plan    Escalations Completed: None    Medically Clear: No    Next Steps: Neo Olsen, Worker's Comp CM at bedside. She states that Rehab has been auth'd when medically cleared. Requested updated progress notes to be faxed to her at . Documents faxed.    Barriers to Discharge: Medical clearance    Is the patient up for discharge tomorrow: No, awaiting medical clearance.

## 2022-10-31 NOTE — THERAPY
Speech Language Pathology   Initial Assessment     Patient Name: Nerissa Dennis  AGE:  42 y.o., SEX:  female  Medical Record #: [id number]  Today's Date: 10/31/2022     Precautions  Precautions: Fall Risk  Comments: c-colar on AAT, LSO donned EOB, LLE posterior hip precautions s/p closed reduction    Assessment:    Patient was seen on this date for a cognitive-linguistic evaluation 2/2 SDH. Pt reports that she is at her baseline cognition.     Portions of the COGNISTAT (Neurobehavioral Cognitive Status Assessment) were administered in conjunction with non-standardized assessments.     Patient scored the following on the Cognistat:  Orientation: WNL  Attention: WNL  Comprehension (Language):WNL  Repetition (Language):WNL  Naming (Language):WNL  Memory:WNL  Calculations:WNL  Similarities (Reasoning):WNL  Judgment (Reasoning):WNL    Further testing revealed medication management and reading comprehension are within normal limits. The patient and her  both report that the patient is at her baseline cognition. They were provided education on concussions and given recommendations for safe d/c home once medically cleared. Both patient and family member verbalized understanding.    Pt presenting with a functional cognition and appears to be at her baseline.   Supervision Needs Upon Discharge: None for the following IADLs: N/A.     Plan    Recommend Speech Therapy for Evaluation only for the following treatments:  Cognitive-Linguistic Training.    Discharge Recommendations: Anticipate that the patient will have no further speech therapy needs after discharge from the hospital       Objective       10/31/22 0851   Initial Contact Note    Initial Contact Note  Order Received and Verified, Speech Therapy Evaluation in Progress with Full Report to Follow.   Precautions   Precautions Fall Risk   Vitals   O2 Delivery Device None - Room Air   Pain 0 - 10 Group   Therapist Pain Assessment Nurse Notified;Post Activity Pain Same as  Prior to Activity;0   Prior Living Situation   Lives with - Patient's Self Care Capacity Spouse   Prior Level Of Function   Patient's Primary Language English   Education Some College or Trade School   Occupation (Pre-Hospital Vocational) Employed Full Time   Anticipated Discharge Needs   Discharge Recommendations Anticipate that the patient will have no further speech therapy needs after discharge from the hospital   Therapy Recommendations Upon DC Not Indicated

## 2022-10-31 NOTE — CARE PLAN
The patient is Stable - Low risk of patient condition declining or worsening    Shift Goals  Clinical Goals: pain management, BM protocols, mobility  Patient Goals: rest, pain control  Family Goals: DONAVON    Progress made toward(s) clinical / shift goals:      Problem: Knowledge Deficit - Standard  Goal: Patient and family/care givers will demonstrate understanding of plan of care, disease process/condition, diagnostic tests and medications  Outcome: Progressing     Problem: Pain - Standard  Goal: Alleviation of pain or a reduction in pain to the patient’s comfort goal  Outcome: Progressing     Problem: Skin Integrity  Goal: Skin integrity is maintained or improved  Outcome: Progressing     Problem: Gastrointestinal Irritability  Goal: Nausea and vomiting will be absent or improve  Outcome: Progressing       Patient is not progressing towards the following goals:

## 2022-10-31 NOTE — PREADMISSION SCREENING NOTE
Updated Pre-Screen Assessment     Name: Nerissa Dennis  MRN: [id number]  : 1980    Medical Status/ Changes:     Trauma / Surgical Daily Progress Note     Date of Service  2022     Chief Complaint  42 y.o. female admitted 10/28/2022 as a trauma green - crush injury - right brachial plexus injury, SDH, lumbar compression fx, cervical fx, left hip dislocation, bilateral post rib fractures, left fibular fracture.     Interval Events  Sad regarding her right arm   Adequate pain control  Voiding post beck removal  IS 1750 cc  Constipation addressed   Rehab pending work comp approval  Medically cleared to attend      Review of Systems  Review of Systems   Constitutional:  Positive for malaise/fatigue.   HENT: Negative.     Respiratory: Negative.     Gastrointestinal:         BM PTA   Genitourinary:         Voiding   Musculoskeletal:  Positive for myalgias.   Neurological:  Positive for focal weakness.   Psychiatric/Behavioral: Negative.     All other systems reviewed and are negative.      Vital Signs  Temp:  [36.3 °C (97.3 °F)-36.7 °C (98 °F)] 36.6 °C (97.9 °F)  Pulse:  [58-79] 58  Resp:  [15-17] 16  BP: (103-118)/(59-80) 118/80  SpO2:  [94 %-98 %] 95 %     Physical Exam  Physical Exam  Vitals and nursing note reviewed.   Constitutional:       General: She is not in acute distress.     Appearance: Normal appearance.      Interventions: Cervical collar in place.   HENT:      Head:      Comments: Various facial abrasions     Right Ear: External ear normal.      Left Ear: External ear normal.      Nose: Nose normal.      Mouth/Throat:      Mouth: Mucous membranes are moist.      Pharynx: Oropharynx is clear.   Eyes:      General:         Right eye: No discharge.         Left eye: No discharge.      Pupils: Pupils are equal, round, and reactive to light.   Cardiovascular:      Pulses: Normal pulses.   Pulmonary:      Effort: Pulmonary effort is normal. No respiratory distress.      Comments: Diminished bibasilar    Chest:      Chest wall: Tenderness present.   Abdominal:      General: There is no distension.      Palpations: Abdomen is soft.      Tenderness: There is no abdominal tenderness.   Musculoskeletal:      Cervical back: Tenderness present. No muscular tenderness.   Skin:     General: Skin is warm.      Capillary Refill: Capillary refill takes less than 2 seconds.   Neurological:      General: No focal deficit present.      Mental Status: She is alert and oriented to person, place, and time.      Motor: Weakness (right arm weakness, right hand with some movement) present.   Psychiatric:         Mood and Affect: Mood normal.         Behavior: Behavior normal.         Thought Content: Thought content normal.      Core Measures & Quality Metrics  Labs reviewed and Medications reviewed  Beck catheter: No Beck        DVT Prophylaxis: Enoxaparin (Lovenox)  DVT prophylaxis - mechanical: SCDs  Ulcer prophylaxis: Not indicated     Assessed for rehab: Patient was assess for and/or received rehabilitation services during this hospitalization  RAP Score Total: 9  CAGE Results: negative Blood Alcohol>0.08: no      Assessment/Plan  Brachial plexus injury- (present on admission)  Assessment & Plan  Plan film negative for acute fracture.  10/31 MR shoulder completed. Decadron initiated per neurosurgery.  Plan for EMG in 3 weeks if not improving.  Right arm weakness.  Josias Schreiber MD. Neurosurgeon. [facility].     Urinary retention- (present on admission)  Assessment & Plan  10/28 Beck placed for retention.  11/1 Trial beck removal.     Closed fracture of distal end of left fibula- (present on admission)  Assessment & Plan  Left distal fibula fracture.  Non-operative management.  Weight bearing status - Weightbearing as tolerated LLE in air splint.  Follow up 2-4 weeks.  Ministerio Watts MD. Orthopedic Surgeon. [facility].     Hip dislocation, left, initial encounter (HCC)- (present on  admission)  Assessment & Plan  Left hip dislocation.  Reduced in ED.  Non-operative management.  Weight bearing status - Weightbearing as tolerated LLE.  Posterior hip precautions.  Follow up 2-4 weeks.  Ministerio Watts MD. Orthopedic Surgeon. [facility].     Lumbar compression fracture, closed, initial encounter (HCC)- (present on admission)  Assessment & Plan  Left L4 superior facet fracture. Fracture through superolateral L4 superior endplate. Right T12, L1, L2, and L3 transverse process fractures.  10/29 MR completed.  Non-operative management.  Mobilize as tolerated with bracing. LSO, may don/doff at edge of bed.  Josias Schreiber MD. Neurosurgeon. [facility].     Traumatic subdural hemorrhage, initial encounter- (present on admission)  Assessment & Plan  3.3 mm left posterior subdural hematoma.  mBIG1.  Repeat interval CT imaging brain demonstrated no significant change or progression.  Non-operative management.  Post traumatic pharmacologic seizure prophylaxis for 1 week.  10/31 Speech Language Pathology cognitive evaluation completed, no further needs.  Josias Schreiber MD. Neurosurgeon. [facility].     Fracture of cervical spinous process, initial encounter (HCC)- (present on admission)  Assessment & Plan  C6 spinous process fracture.  10/29 MR with interspinous ligamentous stretch injury, no sign of nerve root injury.  Cervical spine immobilization for 6 weeks.  Josias Schreiber MD. Neurosurgeon. [facility].     Closed fracture of multiple ribs of both sides- (present on admission)  Assessment & Plan  Posterior left 10th and 11th rib fractures. Right posterior third rib fracture. Right posterior 12th rib fracture at costovertebral junction.  Aggressive pulmonary hygiene and multimodal pain management.     Discharge planning issues- (present on admission)  Assessment & Plan  Date of admission: 10/28/2022.  10/29 Transfer orders from SICU.  10/30 Rehab  referral.  Workman's Comp case.  Cleared for discharge: Yes - Date: 11/1.  Discharge delayed: No.  Discharge date: tbd.     No contraindication to deep vein thrombosis (DVT) prophylaxis- (present on admission)  Assessment & Plan  Prophylactic anticoagulation for thrombotic prevention initially contraindicated secondary to elevated bleeding risk.  10/29 Prophylactic dose enoxaparin initiated.      Nasal septum fracture, closed, initial encounter- (present on admission)  Assessment & Plan  Bilateral nasal bone fractures.  Non operative management.     Abnormal finding on lung imaging- (present on admission)  Assessment & Plan  Incidental finding of 5 mm right lower lobe pulmonary nodule.  Follow up with PCP.     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   Trauma Green Activation.  Alex Bryant MD. Trauma Surgery.     Discussed patient condition with RN, Patient, and Dr. Monge .      Functional Status/ Changes:     Physical Therapy   Daily Treatment     Patient Name: Nerissa Dennis  Age:  42 y.o., Sex:  female  Medical Record #: [id number]  Today's Date: 11/1/2022     Precautions  Precautions: Fall Risk;Weight Bearing As Tolerated Left Lower Extremity;Posterior Hip Precautions;Cervical Collar  ;Spinal / Back Precautions ;Lumbosacral Orthosis  Comments: c-collar on AAT, LSO donned EOB, L LE posterior hip precuations s/p closed reduction     Assessment     Pt greeted, unable to recall precautions. Pt ed on spinal precautions, hip precautions, log roll technique and don/dof brace, handouts given. Pt required Paul to log roll to the L and come to sit EOB. Pt req'd assist with don/dof brace; brace appears too large for pt, this therapist contacted traction. Pt able to stand and ambulate 3 feet to transfer to chair w/ FWW and CGA. Pt will continue to benefit from skilled PT to improve functional mobility.      Plan      Continue current treatment plan.     DC Equipment Recommendations: Unable to determine at this time  Discharge Recommendations: Recommend post-acute placement for additional physical therapy services prior to discharge home          11/01/22 0901   Balance   Sitting Balance (Static) Fair   Sitting Balance (Dynamic) Fair -   Standing Balance (Static) Fair -   Standing Balance (Dynamic) Poor +   Weight Shift Sitting Fair   Weight Shift Standing Poor   Skilled Intervention Verbal Cuing;Sequencing;Tactile Cuing   Comments w/ FWW   Gait Analysis   Gait Level Of Assist Contact Guard Assist   Assistive Device Front Wheel Walker   Distance (Feet) 3   # of Times Distance was Traveled 1   Deviation Antalgic;Bradykinetic   Weight Bearing Status WBAT LLE   Skilled Intervention Verbal Cuing;Tactile Cuing;Sequencing   Comments pt able to transfers from bed to chair ambulating 3ft   Bed Mobility    Supine to Sit Minimal Assist   Scooting Standby Assist   Rolling Minimum Assist to Lt.   Skilled Intervention Sequencing;Tactile Cuing;Verbal Cuing;Postural Facilitation   Comments log roll to left due to shoulder pain. req'd heavy cuing for sequencing. pt remained up in chair.   Functional Mobility   Sit to Stand Contact Guard Assist   Bed, Chair, Wheelchair Transfer Contact Guard Assist   Transfer Method Stand Step   Skilled Intervention Verbal Cuing;Tactile Cuing;Sequencing   Comments Pt unable to use R UE for support with STS or on walker. pt discussing this with neuro at end of PT session   Short Term Goals    Short Term Goal # 1 pt will be able to compelte supine<>Sitting from flat bed with SPV in 6tx in order to dc home   Goal Outcome # 1 goal not met   Short Term Goal # 2 pt will be able to compelte functional transfers with FWW and SPV in 6tx in order to dc home   Goal Outcome # 2 Goal not met   Short Term Goal # 3 pt will be able to ambulate 50ft with FWW and SPV in 6tx in order to dc home   Goal Outcome # 3 Goal not met    Short Term Goal # 4 pt will be able to negotiate 2 steps with min assist in 6tx in order to enter and exit home with spouse   Goal Outcome # 4 Goal not met   Education Group   Spine Precautions Patient Response Patient;Acceptance;Eager;Demonstration;Explanation;Handout;Verbal Demonstration;Action Demonstration   Cervical Precautions Patient Response Patient;Acceptance;Eager;Explanation;Demonstration;Handout;Verbal Demonstration;Action Demonstration   Weight Bearing Status Patient Response Patient;Acceptance;Eager;Explanation;Demonstration;Verbal Demonstration;Action Demonstration   Brace Wear & Care Patient Response Patient;Acceptance;Eager;Explanation;Demonstration;Handout;Verbal Demonstration;Action Demonstration   Additional Comments pt ed on ankle pumps   Supervising Physical Therapist (PTA Treatments Only)   Supervising Physical Therapist Kimmy Mckeon            Reviewer: Janell Onofre  Date: 2022  Time: 1:50 PM  Pre-Admission Screening Form    Patient Information:   Name: Nerissa Dennis     MRN: [id number]       : 1980      Age: 42 y.o.   Gender: female      Race: White [7]       Marital Status:  [2]  Family Contact: Kamran Dennis        Relationship: Spouse [17]  Home Phone: [phone]           Cell Phone:   Advanced Directives: Copy in Chart  Code Status:  FULL  Current Attending Provider: Alex Monge M.D.  Referring Physician: Skyler CARRERA      Physiatrist Consult: Dr. Jean       Referral Date: 10/31/22  Primary Payor Source:  [facility]  Secondary Payor Source:      Medical Information:   Date of Admission to Acute Care Setting:10/28/2022  Room Number: T333/02  Rehabilitation Diagnosis: 0014.9 - Major Multiple Trauma: Other Multiple Trauma  Immunization History   Administered Date(s) Administered    Influenza Vaccine Quad Inj (Pf) 2020    Margarita SARS-CoV-2 Vaccine 2021    Tdap Vaccine 10/28/2022     No Known Allergies  History reviewed. No pertinent past medical  history.  History reviewed. No pertinent surgical history.    History Leading to Admission, Conditions that Caused the Need for Rehab (CMS): Major Multiple Trauma  Co-morbidities:  As noted above and below  Potential Risk - Complications: Cognitive Impairment, Deep Vein Thrombosis, Pain, Pressure Ulcer, and Urinary Tract Infection  Level of Risk: Medium    Ongoing Medical Management Needed (Medical/Nursing Needs):   Patient Active Problem List    Diagnosis Date Noted    Weakness of right upper extremity 10/31/2022    Discharge planning issues 10/30/2022    Acute pain of right shoulder 10/29/2022    Urinary retention 10/29/2022    Trauma 10/28/2022    Closed fracture of multiple ribs of both sides 10/28/2022    Abnormal finding on lung imaging 10/28/2022    Fracture of cervical spinous process, initial encounter ([facility]) 10/28/2022    Traumatic subdural hemorrhage, initial encounter 10/28/2022    Nasal septum fracture, closed, initial encounter 10/28/2022    Lumbar compression fracture, closed, initial encounter ([facility]) 10/28/2022    Hip dislocation, left, initial encounter ([facility]) 10/28/2022    No contraindication to deep vein thrombosis (DVT) prophylaxis 10/28/2022    Closed fracture of distal end of left fibula 10/28/2022   Date of initial consultation: 10/31/2022  Consulting provider: PATRICIA Barry  Reason for consultation: assess for acute inpatient rehab appropriateness  LOS: 3 Day(s)     Chief complaint: polytrauma      HPI: The patient is a 42 y.o. right hand dominant female with a past medical history;  who presented on 10/28/2022  4:47 AM with head and neck trauma secondary to a rock fall event while at work.  Patient is a  from [place], a 3 x 4 boulder broke loose and cracked her hard hat and knocked her out. Patient had hyperparesthesia in the right C8 dermatome.  Work-up at [facility] found C6 spinous process fracture, 3.3 mm left posterior subdural hematoma, multiple TP fractures, L4 compression  fracture, multiple rib fractures and left posterior hip dislocation.  Hip fracture was reduced in ED, all spine fractures were nonoperative.  Patient was seen by PT and OT and found to have difficulty with mobility and ADLs, therefore was referred for physiatry consult.     The patient currently reports pain in all limbs, numbness and tingling in the right arm. She is joined in the room by her  who is supportive. He also works in the mines and is available to help on discharge.      ROS  Pertinent positives are mentioned in the HPI, all others reviewed and are negative.     Social Hx:  1 SH  2 SIOMARA  With: Spouse     THERAPY:  Restrictions: Posterior hip precautions, c-collar, LSO at edge of bed, fall risk  PT: Functional mobility   10/30: Unable to participate in gait, min assist transfers, min assist sit to stand     OT: ADLs  10/30: Max assist dressing     SLP:   None     IMAGING:   L-spine 10/29/2022  1.  Anterior compression fractures at L3 and L4 vertebral bodies.  2.  There are multiple right-sided transverse process fractures. This is better visualized on the recent CT scan.  3.  Posterior paraspinal and right psoas muscular strain adjacent to the transverse process fractures.  4.  There is increased T2 signal intensity at the L3-4 interspinous space likely representing ligament injury.  5.  There is also disruption of the right ligamentum flavum at the level of L3-4.  6.  Minimal epidural hemorrhage at the levels of L3 and L4.     PROCEDURES:  None     PMH:  Past Medical History  History reviewed. No pertinent past medical history.       PSH:  Past Surgical History  History reviewed. No pertinent surgical history.       FHX:  Non-pertinent to today's issues     Medications:  Current Facility-Administered Medications  Medication Dose   gabapentin (NEURONTIN) capsule 300 mg  300 mg   lidocaine (LIDODERM) 5 % 1-3 Patch  1-3 Patch   levETIRAcetam (KEPPRA) tablet 500 mg  500 mg   Respiratory Therapy  "Consult     Pharmacy Consult Request ...Pain Management Review 1 Each  1 Each   ondansetron (ZOFRAN ODT) dispertab 4 mg  4 mg   docusate sodium (COLACE) capsule 100 mg  100 mg   senna-docusate (PERICOLACE or SENOKOT S) 8.6-50 MG per tablet 1 Tablet  1 Tablet   senna-docusate (PERICOLACE or SENOKOT S) 8.6-50 MG per tablet 1 Tablet  1 Tablet   polyethylene glycol/lytes (MIRALAX) PACKET 1 Packet  1 Packet   bisacodyl (DULCOLAX) suppository 10 mg  10 mg   sodium phosphate (Fleet) enema 133 mL  1 Each   enoxaparin (Lovenox) inj 30 mg  30 mg   oxyCODONE immediate-release (ROXICODONE) tablet 5 mg  5 mg    Or   oxyCODONE immediate release (ROXICODONE) tablet 10 mg  10 mg   metaxalone (Skelaxin) tablet 800 mg  800 mg   acetaminophen (Tylenol) tablet 650 mg  650 mg        Allergies:  No Known Allergies        Physical Exam:  Vitals: BP (!) 98/63   Pulse 88   Temp 35.9 °C (96.7 °F) (Temporal)   Resp 16   Ht 1.676 m (5' 6\")   Wt 72.5 kg (159 lb 13.3 oz)   SpO2 93%   Gen: NAD  Head: Superficial abrasions on head and face   Eyes/ Nose/ Mouth: moist mucous membranes  Cardio: RRR, good distal perfusion, warm extremities  Pulm: normal respiratory effort, no cyanosis   Abd: Soft NTND, negative borborygmi   Ext: left ankle in soft cast, 2+ pedal edema      Mental status:  A&Ox4 (person, place, date, situation) answers questions appropriately follows commands  Speech: fluent, no aphasia or dysarthria     Motor:                            Upper Extremity  Myotome R L  Shoulder flexion C5 0/5 5  Elbow flexion C5 2/5 5  Wrist extension C6 4/5 5  Elbow extension C7 3/5 5  Finger flexion C8 4/5 5  Finger abduction T1 4/5 5     Lower Extremity Myotome R L  Hip flexion L2 4/5 3/5  Knee extension L3 5 4/5  Ankle dorsiflexion L4 5 4/5  Toe extension L5 5 4/5  Ankle plantarflexion S1 5 4/5     Sensory:   Patient reports altered sensation to light touch in the upper right arm. Testing with sharp and dull is intact from C2 to C8.       "   DTRs:  Right  Left   Brachioradialis  1+  1+  Patella tendon  2+ 2+     Negative Reyes b/l      Tone: no spasticity noted, no cogwheeling noted     Labs: Reviewed and significant for   Recent Labs    10/28/22  0712 10/29/22  0507 10/30/22  0643 10/31/22  0540  RBC  --  3.46* 3.48* 3.32*  HEMOGLOBIN  --  11.5* 11.3* 10.7*  HEMATOCRIT  --  34.6* 34.7* 33.0*  PLATELETCT  --  204 190 204  PROTHROMBTM 14.1  --   --   --   APTT 23.5*  --   --   --   INR 1.10  --   --   --      Recent Labs    10/29/22  0507 10/30/22  0643  SODIUM 138 138  POTASSIUM 3.9 3.9  CHLORIDE 105 102  CO2 24 26  GLUCOSE 103* 87  BUN 12 10  CREATININE 0.77 0.67  CALCIUM 8.3* 8.4*     Recent Results  Recent Results (from the past 24 hour(s))  CBC WITH DIFFERENTIAL    Collection Time: 10/30/22  6:43 AM  Result Value Ref Range    WBC 5.9 4.8 - 10.8 K/uL    RBC 3.48 (L) 4.20 - 5.40 M/uL    Hemoglobin 11.3 (L) 12.0 - 16.0 g/dL    Hematocrit 34.7 (L) 37.0 - 47.0 %    MCV 99.7 (H) 81.4 - 97.8 fL    MCH 32.5 27.0 - 33.0 pg    MCHC 32.6 (L) 33.6 - 35.0 g/dL    RDW 43.1 35.9 - 50.0 fL    Platelet Count 190 164 - 446 K/uL    MPV 8.9 (L) 9.0 - 12.9 fL    Neutrophils-Polys 68.10 44.00 - 72.00 %    Lymphocytes 21.70 (L) 22.00 - 41.00 %    Monocytes 6.70 0.00 - 13.40 %    Eosinophils 2.70 0.00 - 6.90 %    Basophils 0.30 0.00 - 1.80 %    Immature Granulocytes 0.50 0.00 - 0.90 %    Nucleated RBC 0.00 /100 WBC    Neutrophils (Absolute) 3.98 2.00 - 7.15 K/uL    Lymphs (Absolute) 1.27 1.00 - 4.80 K/uL    Monos (Absolute) 0.39 0.00 - 0.85 K/uL    Eos (Absolute) 0.16 0.00 - 0.51 K/uL    Baso (Absolute) 0.02 0.00 - 0.12 K/uL    Immature Granulocytes (abs) 0.03 0.00 - 0.11 K/uL    NRBC (Absolute) 0.00 K/uL  Basic Metabolic Panel    Collection Time: 10/30/22  6:43 AM  Result Value Ref Range    Sodium 138 135 - 145 mmol/L    Potassium 3.9 3.6 - 5.5 mmol/L    Chloride 102 96 - 112 mmol/L    Co2 26 20 - 33 mmol/L    Glucose 87 65 - 99 mg/dL    Bun 10 8 - 22 mg/dL     Creatinine 0.67 0.50 - 1.40 mg/dL    Calcium 8.4 (L) 8.5 - 10.5 mg/dL    Anion Gap 10.0 7.0 - 16.0  ESTIMATED GFR    Collection Time: 10/30/22  6:43 AM  Result Value Ref Range    GFR (CKD-EPI) 112 >60 mL/min/1.73 m 2  CBC WITH DIFFERENTIAL    Collection Time: 10/31/22  5:40 AM  Result Value Ref Range    WBC 4.0 (L) 4.8 - 10.8 K/uL    RBC 3.32 (L) 4.20 - 5.40 M/uL    Hemoglobin 10.7 (L) 12.0 - 16.0 g/dL    Hematocrit 33.0 (L) 37.0 - 47.0 %    MCV 99.4 (H) 81.4 - 97.8 fL    MCH 32.2 27.0 - 33.0 pg    MCHC 32.4 (L) 33.6 - 35.0 g/dL    RDW 44.0 35.9 - 50.0 fL    Platelet Count 204 164 - 446 K/uL    MPV 8.9 (L) 9.0 - 12.9 fL    Neutrophils-Polys 49.00 44.00 - 72.00 %    Lymphocytes 35.20 22.00 - 41.00 %    Monocytes 9.60 0.00 - 13.40 %    Eosinophils 5.10 0.00 - 6.90 %    Basophils 0.80 0.00 - 1.80 %    Immature Granulocytes 0.30 0.00 - 0.90 %    Nucleated RBC 0.00 /100 WBC    Neutrophils (Absolute) 1.94 (L) 2.00 - 7.15 K/uL    Lymphs (Absolute) 1.39 1.00 - 4.80 K/uL    Monos (Absolute) 0.38 0.00 - 0.85 K/uL    Eos (Absolute) 0.20 0.00 - 0.51 K/uL    Baso (Absolute) 0.03 0.00 - 0.12 K/uL    Immature Granulocytes (abs) 0.01 0.00 - 0.11 K/uL    NRBC (Absolute) 0.00 K/uL             ASSESSMENT:  Patient is a 42 y.o. female admitted with polytrauma after boulder strike in a mine, most significant for right suspected brachial plexus injury, left hip dislocation, left fibula fracture and bilateral rib fractures.      [facility] Code / Diagnosis to Support: 0014.9 - Major Multiple Trauma: Other Multiple Trauma     Rehabilitation: Impaired ADLs and mobility  Patient is a good candidate for inpatient rehab based on needs for PT, OT.  Patient will also benefit from family training.  Patient has a good discharge situation which will be home with spouse.      Barriers to transfer include: Insurance authorization, TCCs to verify disposition, medical clearance and bed availability      All cases are subject to administrative review and  recommendations may change     Disposition recommendations:  - Good candidate for IPR.   - TCC to submit to  for prior authorization for IPR   -PMR to follow in the periphery for rehab appropriateness, please reach out with questions or request for medical management     Medical Complexity:     Right arm weakness   - Suspect brachial plexus upper trunk stretch is the etiology from the boulder strike  - EMG as outpatient will confirm this   - Primary team will image the brachial plexus to look for physical changes in the area   - Continue PT/OT while in house  - Increasing gabapentin to 500mg TID. High risk medication, labs reviewed, CrCl 105.3, .      Polytrauma  - BL rib fractures, left fibula fracture, left hip dislocation, small but stable posterior parietal SDH, C6 spinous process fracture, L4 compression fracture  - WBAT, posterior hip ppx, C-collar at all times, LSO when OOB   - Pain control per primary team   - on Keppra for seizure ppx  - PT/OT while in house   - Plan for IPR      Anemia   - HGB dropping, now 10.7  - etiology somewhat unknown, she did not have surgery. Some blood loss on the scene but admission hgb was WNL    - Monitor daily with CBC   - Consider iron supplementation         DVT PPX: lovenox         Thank you for allowing us to participate in the care of this patient.   Tahir Jean, DO   Physical Medicine and Rehabilitation      Please note that this dictation was created using voice recognition software. I have made every reasonable attempt to correct obvious errors, but there may be errors of grammar and possibly content that I did not discover before finalizing the note.  Chief complaints multiple spine fractures and a 3.3 mm left posterior subdural hematoma that is noncompressive.     Brief HPI this is a 42-year-old minor from [place] who had a rock fall onto her knocked her out cracking her hard hat she subsequently had significant pain she also has a hyperesthesia in the right  upper extremity in a C8 dermatomal distribution she was brought to [facility] for definitive care on her imaging she has a C6 spinous process fracture she has a 3.3 mm left posterior subdural hematoma which is very minimal in thickness it is not holohemispheric and is not causing mass-effect she has no facet fractures or other concerning findings in the cervical spine and her lumbar spine she has multiple TP fractures and per final read an L4 compression fracture however on our interpretation she seems to have an L5 compression.  Compression fracture more so than L4 will defer to the radiologist on that for final read she has a L4 left-sided facet superior fracture that is nondisplaced and apparently nonsymptomatic and she has T12 L1-L2 and L3 transverse process fractures.     The patient does not remember the incident she said that there was a subsequently worker who is with her who pulled her out from underneath arrival and she recovered quite quickly in the hospital bed she is doing very well she has not had any cognitive issues or other concerning findings aside from the multiple spine fractures and the paresthesia in her right upper extremity.     12 point view systems as per HPI  Past surgical history medical history medications noncontributory  Social history she does not use any nicotine products  Physical examination she is alert she is oriented she is able to answer question appropriately she does not have any signs dysarthria aphasia  Cranial nerves are grossly intact  HEENT shows abrasions on the right side of her face and a laceration on her forehead that was repaired  Motor examination the patient has a hyperesthesia in a C8 dermatomal distribution she has numbness in her middle on a restarted a right-sided ring finger and middle finger and she does not have any weakness in the hands bilaterally she has full strength in both upper and lower extremities and she is not complaining of  any radiculopathy or pains down her legs.     Imaging as stated above the patient has a polytrauma she has a very small subdural in the parietal region of her brain and measured 3.3 mm is not holohemispheric is very slight small in terms of overall volume of blood.  On her cervical thoracic and lumbar CT scans the patient demonstrates a spinous process fracture at C6 there are no other fracture subluxation or other concerns in that area we do not see anything that would be leading to the patient's hyperesthesia in her hand is likely that this is a stainer and should resolve if it is not resolving within the next 24 hours we would repeat an MRI of the cervical spine.     The thoracic and lumbar MRI showed multiple levels of transverse process fracture and endplate fracture at L4 the most structurally significant fracture that the patient demonstrates is a left superior facet fracture that seems to be slightly displaced into the foramen which she is not symptomatic from from the standpoint this should heal with conservative treatment in a brace.     Assessment and plan  At this time the patient is doing well from the severity of her injury we would defer to the ICU for necessity for critical care management she can have a repeat head CT to meet criteria for getting transferred out of the ICU and should have a repeat head CT 8 hours from the time of her original head CT if that is clear she can be placed to every 4 hours neurochecks transferred to the floor and started on Keppra 500 twice daily please hold systemic anticoagulation for a total of 10 days with the patient to be started on DVT prophylaxis soon as the CT head shows stability.     Maintain standard blood pressure parameters less than 160 systolics  Keppra 500 twice daily  Every 4 hours neurochecks once CT head is stable.     From the lumbar standpoint her cervical standpoint we would obtain an MRI of the cervical spine to confirm that there is no  compression of the C8 or C7 nerve root we do not think that she has a structural instability unstable neck this would also allow her to get cleared from the c-collar for the simple spinous process fracture at C6 if that is relevant  From a lumbar standpoint she is getting an MRI that would be helpful for the signal facet fracture  She can have an abdominal binder or a LSO brace for the facet fracture to ensure that she does not subluxation if she develops a radiculopathy is likely secondary to the L4 superior facet fracture she may need to have any intervention for that if it develops however at this time she is asymptomatic from that fracture and we would treated conservatively.     The patient could be placed in a brace at bedside and ambulated with PT OT for clearance.     We will reevaluate the patient after MRI of the cervical and lumbar spine were completed  She will also need repeat head CT for clearance transfer out of the unit by the criteria     Total of 50 minutes in direct patient care coordination consultation            Chief complaints multiple spine fractures and a 3.3 mm left posterior subdural hematoma that is noncompressive.     Brief HPI this is a 42-year-old minor from [place] who had a rock fall onto her knocked her out cracking her hard hat she subsequently had significant pain she also has a hyperesthesia in the right upper extremity in a C8 dermatomal distribution she was brought to [facility] for definitive care on her imaging she has a C6 spinous process fracture she has a 3.3 mm left posterior subdural hematoma which is very minimal in thickness it is not holohemispheric and is not causing mass-effect she has no facet fractures or other concerning findings in the cervical spine and her lumbar spine she has multiple TP fractures and per final read an L4 compression fracture however on our interpretation she seems to have an L5 compression.  Compression fracture more so  than L4 will defer to the radiologist on that for final read she has a L4 left-sided facet superior fracture that is nondisplaced and apparently nonsymptomatic and she has T12 L1-L2 and L3 transverse process fractures.     The patient does not remember the incident she said that there was a subsequently worker who is with her who pulled her out from underneath arrival and she recovered quite quickly in the hospital bed she is doing very well she has not had any cognitive issues or other concerning findings aside from the multiple spine fractures and the paresthesia in her right upper extremity.     12 point view systems as per HPI  Past surgical history medical history medications noncontributory  Social history she does not use any nicotine products  Physical examination she is alert she is oriented she is able to answer question appropriately she does not have any signs dysarthria aphasia  Cranial nerves are grossly intact  HEENT shows abrasions on the right side of her face and a laceration on her forehead that was repaired  Motor examination the patient has a hyperesthesia in a C8 dermatomal distribution she has numbness in her middle on a restarted a right-sided ring finger and middle finger and she does not have any weakness in the hands bilaterally she has full strength in both upper and lower extremities and she is not complaining of any radiculopathy or pains down her legs.     Imaging as stated above the patient has a polytrauma she has a very small subdural in the parietal region of her brain and measured 3.3 mm is not holohemispheric is very slight small in terms of overall volume of blood.  On her cervical thoracic and lumbar CT scans the patient demonstrates a spinous process fracture at C6 there are no other fracture subluxation or other concerns in that area we do not see anything that would be leading to the patient's hyperesthesia in her hand is likely that this is a stainer and should resolve if  it is not resolving within the next 24 hours we would repeat an MRI of the cervical spine.     The thoracic and lumbar MRI showed multiple levels of transverse process fracture and endplate fracture at L4 the most structurally significant fracture that the patient demonstrates is a left superior facet fracture that seems to be slightly displaced into the foramen which she is not symptomatic from from the standpoint this should heal with conservative treatment in a brace.     Assessment and plan  At this time the patient is doing well from the severity of her injury we would defer to the ICU for necessity for critical care management she can have a repeat head CT to meet criteria for getting transferred out of the ICU and should have a repeat head CT 8 hours from the time of her original head CT if that is clear she can be placed to every 4 hours neurochecks transferred to the floor and started on Keppra 500 twice daily please hold systemic anticoagulation for a total of 10 days with the patient to be started on DVT prophylaxis soon as the CT head shows stability.     Maintain standard blood pressure parameters less than 160 systolics  Keppra 500 twice daily  Every 4 hours neurochecks once CT head is stable.     From the lumbar standpoint her cervical standpoint we would obtain an MRI of the cervical spine to confirm that there is no compression of the C8 or C7 nerve root we do not think that she has a structural instability unstable neck this would also allow her to get cleared from the c-collar for the simple spinous process fracture at C6 if that is relevant  From a lumbar standpoint she is getting an MRI that would be helpful for the signal facet fracture  She can have an abdominal binder or a LSO brace for the facet fracture to ensure that she does not subluxation if she develops a radiculopathy is likely secondary to the L4 superior facet fracture she may need to have any intervention for that if it develops  however at this time she is asymptomatic from that fracture and we would treated conservatively.     The patient could be placed in a brace at bedside and ambulated with PT OT for clearance.     We will reevaluate the patient after MRI of the cervical and lumbar spine were completed  She will also need repeat head CT for clearance transfer out of the unit by the criteria     Total of 50 minutes in direct patient care coordination consultation        DATE OF SERVICE:  10/28/2022      ORTHOPEDIC CONSULTATION     REQUESTING PHYSICIAN:  Bob Castillo MD, Emergency Department.     REASON FOR CONSULTATION:  Left hip dislocation.     CHIEF COMPLAINT:  Back pain, left ankle pain, hip pain.     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  She had a left hip dislocation, which Dr. Casitllo performed closed reduction before in the Emergency Department. Today,   she is being evaluated for admission to the Trauma Surgical Service with   subdural hematoma, nasal bone fractures, cervical and lumbar spine fractures,   multiple bilateral rib fractures.  I was asked to consult and provide   treatment recommendations for her hip dislocation, which was reduced in the   Emergency Department.  She denies numbness or paresthesias.  She is having a   little bit of pain in the lateral left ankle.  She has a knee immobilizer in   place to prevent recurrent dislocation.     PAST MEDICAL HISTORY:  ALLERGIES:  No known drug allergies.     OUTPATIENT MEDICATIONS:  None.     PAST MEDICAL DIAGNOSIS:  None.     PAST SURGICAL HISTORY:  None listed.     PHYSICAL EXAMINATION:  VITAL SIGNS:  Temperature is 96, heart rate 79, respiratory rate 23, blood   pressure 109/61, pulse oximetry is 95% on room air.  GENERAL APPEARANCE:  The patient is alert and oriented, pleasant, cooperative,   in no acute distress,  lying supine in the hospital bed.  She has a cervical   collar in place.  MUSCULOSKELETAL:  Right upper extremity: she is nontender to palpation of the   anterior shoulder and clavicle area.  She has a Lidoderm patch in place.  She   is tender over the posterior shoulder over the scapula.  She has limited   active shoulder range of motion, but she is neurovascularly intact distally.    Left upper extremity is grossly neurovascularly intact without evidence of   obvious traumatic deformity.  Right lower extremity is grossly neurovascularly   intact without evidence of obvious traumatic deformity.  Her knee is   ligamentously stable.  There is no knee effusion present.  Left lower   extremity, she has some mild lateral ankle swelling where she is tender to   palpation over the distal aspect of the fibula.  She is nontender at the   medial malleolus and the medial deltoid ligament.  Her knee is stable to varus   and valgus stress, Lachman and posterior drawer.  She has no pain with log   roll localized to the left lower extremity.  There are no open wounds present.     DIAGNOSTIC IMAGING:  Plain x-rays of the left ankle show a small avulsion   fracture of the distal fibula consistent with lateral ankle sprain with   avulsion.  Plain x-rays:  AP pelvis shows concentric reduction of the left hip   dislocation compared to pre-reduction x-rays where there is posterior hip   dislocation.  CT of the pelvis does not show any evidence of obvious fracture   about the pelvis or left hip joint.  There is no evidence of obvious fracture   around the right shoulder seen on CT imaging.  Plain x-rays of the right   shoulder are unremarkable for acute bony abnormality.     ASSESSMENT:  A 42-year-old female admitted to the Trauma Surgical ICU with a   subdural hematoma, multiple bilateral rib fractures, cervical and lumbar spine   fractures as well as a left hip dislocation reduced in the Emergency   Department with concentric  "reduction and no evidence of obvious fracture   there.  She has an left ankle distal fibular avulsion fracture essentially   ankle sprain.     RECOMMENDATIONS:  1.  I discussed these findings with the patient and her significance other   than I feel that she can weightbear as tolerated left lower extremity in an   ankle brace for comfort with crutches and/or walker just to unload the hip.  2.  She should maintain posterior hip precautions.  Recommend physical therapy   to teach her those positions to avoid.  3.  Ultimately, she can follow up with me on an outpatient basis in 2-4 weeks   for routine x-ray just to confirm stable alignment to rule out the early   development of avascular necrosis.  We did discuss potential for development   of avascular necrosis, which could happen anytime within the next couple of   years, but feel that the likelihood is low as her hip was reduced apparently   within 6 hours of her injury.            Current Vital Signs:   Temperature: 36.4 °C (97.6 °F) Pulse: 81 Respiration: 15 Blood Pressure: 103/69  Weight: 72.5 kg (159 lb 13.3 oz) Height: 167.6 cm (5' 6\")  Pulse Oximetry: 95 % O2 (LPM): 0      Completed Laboratory Reports:  Recent Labs     10/29/22  0507 10/30/22  0643 10/31/22  0540   WBC 5.8 5.9 4.0*   HEMOGLOBIN 11.5* 11.3* 10.7*   HEMATOCRIT 34.6* 34.7* 33.0*   PLATELETCT 204 190 204   SODIUM 138 138 137   POTASSIUM 3.9 3.9 3.7   BUN 12 10 11   CREATININE 0.77 0.67 0.71   GLUCOSE 103* 87 109*     Additional Labs: Not Applicable    Prior Living Situation:   Housing / Facility: 1 Story House  Steps Into Home: 2  Steps In Home: 0  Lives with - Patient's Self Care Capacity: Spouse  Equipment Owned: None    Prior Level of Function / Living Situation:   Physical Therapy: Prior Services: None  Housing / Facility: 1 Story House  Steps Into Home: 2  Steps In Home: 0  Bathroom Set up: Walk In Shower, Bathtub / Shower Combination  Equipment Owned: None  Lives with - Patient's Self Care " Capacity: Spouse  Bed Mobility: Independent  Transfer Status: Independent  Ambulation: Independent  Distance Ambulation (Feet):  (community)  Assistive Devices Used: None  Stairs: Independent  Current Level of Function:   Gait Level Of Assist: Unable to Participate  Weight Bearing Status: WBAT LLE  Supine to Sit: Moderate Assist  Scooting: Maximal Assist  Rolling: Moderate Assist to Lt.  Comments: unable to roll to R d/t pain  Sit to Stand: Minimal Assist  Bed, Chair, Wheelchair Transfer: Minimal Assist  Transfer Method: Stand Pivot  Sitting in Chair: 10+min  Sitting Edge of Bed: 6min  Standing: transfer only  Occupational Therapy:   Self Feeding: Independent  Grooming / Hygiene: Independent  Bathing: Independent  Dressing: Independent  Toileting: Independent  Medication Management: Independent  Laundry: Independent  Kitchen Mobility: Independent  Finances: Independent  Home Management: Independent  Shopping: Independent  Prior Level Of Mobility: Independent Without Device in Community  Driving / Transportation: Driving Independent  Prior Services: None  Housing / Facility: [address] [place] [address] House  Occupation (Pre-Hospital Vocational): Employed Full Time  Current Level of Function:   Eating: Supervision  Upper Body Dressing: Maximal Assist  Lower Body Dressing: Maximal Assist  Speech Language Pathology:      Rehabilitation Prognosis/Potential: Good  Estimated Length of Stay: 14--21 days    Nursing:      Continent    Scope/Intensity of Services Recommended:  Physical Therapy: 1.5 hr / day  5 days / week. Therapeutic Interventions Required: Maximize Endurance, Mobility, Strength, and Safety  Occupational Therapy 1.5hr/day  She requires 24-hour rehabilitation nursing to manage skin care, wound, nutrition and fluid intake, pulmonary hygiene, pain control, safety, medication management, and patient/family goals. In addition, rehabilitation nursing will reiterate and reinforce therapy skills and equipment use, including ADLs, as  well as provide education to the patient and family. Nerissa Dennsi is willing to participate in and is able to tolerate the proposed plan of care.    Rehabilitation Goals and Plan (Expected frequency & duration of treatment in the IRF):   Return to the Community  Anticipated Date of Rehabilitation Admission: when medically cleared  Insurance auth done  Patient/Family oriented IRF level of care/facility/plan: Yes  Patient/Family willing to participate in IRF care/facility/plan: Yes  Patient able to tolerate IRF level of care proposed: Yes  Patient has potential to benefit IRF level of care proposed: Yes  Comments: Not Applicable      Special Needs or Precautions - Medical Necessity:  none  Diet:   DIET ORDERS (From admission to next 24h)       Start     Ordered    10/28/22 1619  Diet Order Diet: Regular  ALL MEALS        Question:  Diet:  Answer:  Regular    10/28/22 1620                    Anticipated Discharge Destination / Patient/Family Goal:  Destination: Home with Assistance Support System: Spouse and Family   Anticipated home health services: OT, PT, and Nursing  Previously used HH service/ provider: Not Applicable  Anticipated DME Needs: Walker  Outpatient Services: OT and PT  Alternative resources to address additional identified needs:   No future appointments.    Pre-Screen Completed: 10/31/2022 10:56 AM RHODA [email or web address].

## 2022-10-31 NOTE — DISCHARGE PLANNING
Left message on answering machine for  Joey.  Wanted to discuss return to community plans and IRF admit.

## 2022-10-31 NOTE — PROGRESS NOTES
Neurosurgery Progress Note    Subjective:  No events    bedside   Feels sore all over   Sitting in chair bedside, C collar and LSO     Exam:  AAO, fluent speech   Facial abrasions   3 PERRL, EOMI   RUE  2/5, tricep/bicep 2/5, deltoid 3/5   Stated numb before the accident, continues    LUE /tricep/bicep 4/5   RLE 5/5  LLE IP/quad 5/5, pf/df 4/5 Left ankle brace   Castillo     BP  Min: 98/63  Max: 109/68  Pulse  Av.2  Min: 75  Max: 88  Resp  Avg: 15.4  Min: 14  Max: 16  Temp  Av.6 °C (97.8 °F)  Min: 35.9 °C (96.7 °F)  Max: 36.9 °C (98.4 °F)  SpO2  Av.2 %  Min: 93 %  Max: 98 %    No data recorded    Recent Labs     10/29/22  0507 10/30/22  0643 10/31/22  0540   WBC 5.8 5.9 4.0*   RBC 3.46* 3.48* 3.32*   HEMOGLOBIN 11.5* 11.3* 10.7*   HEMATOCRIT 34.6* 34.7* 33.0*   .0* 99.7* 99.4*   MCH 33.2* 32.5 32.2   MCHC 33.2* 32.6* 32.4*   RDW 44.4 43.1 44.0   PLATELETCT 204 190 204   MPV 9.0 8.9* 8.9*       Recent Labs     10/29/22  0507 10/30/22  0643 10/31/22  0540   SODIUM 138 138 137   POTASSIUM 3.9 3.9 3.7   CHLORIDE 105 102 102   CO2 24 26 25   GLUCOSE 103* 87 109*   BUN 12 10 11   CREATININE 0.77 0.67 0.71   CALCIUM 8.3* 8.4* 8.4*                   Intake/Output       None            No intake or output data in the 24 hours ending 10/31/22 1148            gabapentin  300 mg Q8HRS    lidocaine  1-3 Patch Q24HRS    levETIRAcetam  500 mg BID    Respiratory Therapy Consult   Continuous RT    Pharmacy Consult Request  1 Each PHARMACY TO DOSE    ondansetron  4 mg Q4HRS PRN    docusate sodium  100 mg BID    senna-docusate  1 Tablet Nightly    senna-docusate  1 Tablet Q24HRS PRN    polyethylene glycol/lytes  1 Packet BID    bisacodyl  10 mg Q24HRS PRN    sodium phosphate  1 Each Once PRN    enoxaparin (LOVENOX) injection  30 mg Q12HRS    oxyCODONE immediate-release  5 mg Q3HRS PRN    Or    oxyCODONE immediate-release  10 mg Q3HRS PRN    metaxalone  800 mg TID    acetaminophen  650 mg 4X/DAY        Assessment and Plan:  Hospital day #4  POD #NA   Prophylactic anticoagulation: yes         Start date/time: 10/31    Plan:  Stable neuro  MRI Cspine  interspinous lig stretch injury no overt Fx aside form C6 sp, no sign of nerve root injury on scan possible brachial plexus stretch injury would perform EMG in 3 weeks if not improving MRI shoulder pending today  Cont. C collar at all times for igamentous injury will clear in 6 weeks possible down grade to soft collar in 2 weeks if pain subsides   LSO when OOB   PT/OT   Pain control per trauma   Cont Keppra     30 mins in care

## 2022-10-31 NOTE — DISCHARGE PLANNING
Chart review indicates patient may have on going medical management and may have therapy needs to possibly meet IRF criteria with the goal of returning to community   D/C support :  per face sheet  .     Payor: Misc Workers Comp  Diagnosis: multiple facial fractures , other fractures   Consult requested : ALEJANDRO Blanco     Physiatry consult per protocol   Thank you for the referral

## 2022-10-31 NOTE — DISCHARGE PLANNING
Case Management Discharge Planning    Admission Date: 10/28/2022  GMLOS: 4.3  ALOS: 3    6-Clicks ADL Score: 13  6-Clicks Mobility Score: 8  PT and/or OT Eval ordered: Yes  Post-acute Referrals Ordered: Yes  Post-acute Choice Obtained: Yes  Has referral(s) been sent to post-acute provider:  Yes      Anticipated Discharge Dispo: Discharge Disposition: Disch to  rehab facility or distinct part unit (62)  Discharge Address: [address] [place] [id number]  Discharge Contact Phone Number: [phone]    DME Needed: No    Action(s) Taken: Updated Provider/Nurse on Discharge Plan    Escalations Completed: None    Medically Clear: No    Next Steps: Worker's Comp has approved Acute Rehab but pt not yet medically cleared.    Barriers to Discharge: Medical clearance    Is the patient up for discharge tomorrow: No

## 2022-10-31 NOTE — CARE PLAN
The patient is Stable - Low risk of patient condition declining or worsening    Shift Goals  Clinical Goals: mobility; bowel movement; pain control  Patient Goals: pain control; rest  Family Goals: DONAVON    Progress made toward(s) clinical / shift goals:    Problem: Knowledge Deficit - Standard  Goal: Patient and family/care givers will demonstrate understanding of plan of care, disease process/condition, diagnostic tests and medications  Outcome: Progressing     Problem: Pain - Standard  Goal: Alleviation of pain or a reduction in pain to the patient’s comfort goal  Outcome: Progressing     Problem: Skin Integrity  Goal: Skin integrity is maintained or improved  Outcome: Progressing       Patient is not progressing towards the following goals:

## 2022-10-31 NOTE — PROGRESS NOTES
Worker's comp requesting copy of C-4. Called [facility], spoke to Candelario. She said C4 is currently being worked on and we can F/Y with her supervisor Sinan Sam at [employer].

## 2022-10-31 NOTE — CONSULTS
Physical Medicine and Rehabilitation Consultation              Date of initial consultation: 10/31/2022  Consulting provider: PATRICIA Barry  Reason for consultation: assess for acute inpatient rehab appropriateness  LOS: 3 Day(s)    Chief complaint: polytrauma     HPI: The patient is a 42 y.o. right hand dominant female with a past medical history;  who presented on 10/28/2022  4:47 AM with head and neck trauma secondary to a rock fall event while at work.  Patient is a  from [place], a 3 x 4 boulder broke loose and cracked her hard hat and knocked her out. Patient had hyperparesthesia in the right C8 dermatome.  Work-up at [facility] ED found C6 spinous process fracture, 3.3 mm left posterior subdural hematoma, multiple TP fractures, L4 compression fracture, multiple rib fractures and left posterior hip dislocation.  Hip fracture was reduced in ED, all spine fractures were nonoperative.  Patient was seen by PT and OT and found to have difficulty with mobility and ADLs, therefore was referred for physiatry consult.    The patient currently reports pain in all limbs, numbness and tingling in the right arm. She is joined in the room by her  who is supportive. He also works in the mines and is available to help on discharge.     ROS  Pertinent positives are mentioned in the HPI, all others reviewed and are negative.    Social Hx:  1 SH  2 SIOMARA  With: Spouse    THERAPY:  Restrictions: Posterior hip precautions, c-collar, LSO at edge of bed, fall risk  PT: Functional mobility   10/30: Unable to participate in gait, min assist transfers, min assist sit to stand    OT: ADLs  10/30: Max assist dressing    SLP:   None    IMAGING:   L-spine 10/29/2022  1.  Anterior compression fractures at L3 and L4 vertebral bodies.  2.  There are multiple right-sided transverse process fractures. This is better visualized on the recent CT scan.  3.  Posterior paraspinal and right psoas muscular strain adjacent to the  "transverse process fractures.  4.  There is increased T2 signal intensity at the L3-4 interspinous space likely representing ligament injury.  5.  There is also disruption of the right ligamentum flavum at the level of L3-4.  6.  Minimal epidural hemorrhage at the levels of L3 and L4.    PROCEDURES:  None    PMH:  History reviewed. No pertinent past medical history.    PSH:  History reviewed. No pertinent surgical history.    FHX:  Non-pertinent to today's issues    Medications:  Current Facility-Administered Medications   Medication Dose    gabapentin (NEURONTIN) capsule 300 mg  300 mg    lidocaine (LIDODERM) 5 % 1-3 Patch  1-3 Patch    levETIRAcetam (KEPPRA) tablet 500 mg  500 mg    Respiratory Therapy Consult      Pharmacy Consult Request ...Pain Management Review 1 Each  1 Each    ondansetron (ZOFRAN ODT) dispertab 4 mg  4 mg    docusate sodium (COLACE) capsule 100 mg  100 mg    senna-docusate (PERICOLACE or SENOKOT S) 8.6-50 MG per tablet 1 Tablet  1 Tablet    senna-docusate (PERICOLACE or SENOKOT S) 8.6-50 MG per tablet 1 Tablet  1 Tablet    polyethylene glycol/lytes (MIRALAX) PACKET 1 Packet  1 Packet    bisacodyl (DULCOLAX) suppository 10 mg  10 mg    sodium phosphate (Fleet) enema 133 mL  1 Each    enoxaparin (Lovenox) inj 30 mg  30 mg    oxyCODONE immediate-release (ROXICODONE) tablet 5 mg  5 mg    Or    oxyCODONE immediate release (ROXICODONE) tablet 10 mg  10 mg    metaxalone (Skelaxin) tablet 800 mg  800 mg    acetaminophen (Tylenol) tablet 650 mg  650 mg       Allergies:  No Known Allergies      Physical Exam:  Vitals: BP (!) 98/63   Pulse 88   Temp 35.9 °C (96.7 °F) (Temporal)   Resp 16   Ht 1.676 m (5' 6\")   Wt 72.5 kg (159 lb 13.3 oz)   SpO2 93%   Gen: NAD  Head: Superficial abrasions on head and face   Eyes/ Nose/ Mouth: moist mucous membranes  Cardio: RRR, good distal perfusion, warm extremities  Pulm: normal respiratory effort, no cyanosis   Abd: Soft NTND, negative borborygmi   Ext: left " ankle in soft cast, 2+ pedal edema     Mental status:  A&Ox4 (person, place, date, situation) answers questions appropriately follows commands  Speech: fluent, no aphasia or dysarthria    Motor:      Upper Extremity  Myotome R L   Shoulder flexion C5 0/5 5   Elbow flexion C5 2/5 5   Wrist extension C6 4/5 5   Elbow extension C7 3/5 5   Finger flexion C8 4/5 5   Finger abduction T1 4/5 5     Lower Extremity Myotome R L   Hip flexion L2 4/5 3/5   Knee extension L3 5 4/5   Ankle dorsiflexion L4 5 4/5   Toe extension L5 5 4/5   Ankle plantarflexion S1 5 4/5     Sensory:   Patient reports altered sensation to light touch in the upper right arm. Testing with sharp and dull is intact from C2 to C8.       DTRs:  Right  Left    Brachioradialis  1+  1+   Patella tendon  2+ 2+     Negative Reyes b/l     Tone: no spasticity noted, no cogwheeling noted    Labs: Reviewed and significant for   Recent Labs     10/28/22  0712 10/29/22  0507 10/30/22  0643 10/31/22  0540   RBC  --  3.46* 3.48* 3.32*   HEMOGLOBIN  --  11.5* 11.3* 10.7*   HEMATOCRIT  --  34.6* 34.7* 33.0*   PLATELETCT  --  204 190 204   PROTHROMBTM 14.1  --   --   --    APTT 23.5*  --   --   --    INR 1.10  --   --   --      Recent Labs     10/29/22  0507 10/30/22  0643   SODIUM 138 138   POTASSIUM 3.9 3.9   CHLORIDE 105 102   CO2 24 26   GLUCOSE 103* 87   BUN 12 10   CREATININE 0.77 0.67   CALCIUM 8.3* 8.4*     Recent Results (from the past 24 hour(s))   CBC WITH DIFFERENTIAL    Collection Time: 10/30/22  6:43 AM   Result Value Ref Range    WBC 5.9 4.8 - 10.8 K/uL    RBC 3.48 (L) 4.20 - 5.40 M/uL    Hemoglobin 11.3 (L) 12.0 - 16.0 g/dL    Hematocrit 34.7 (L) 37.0 - 47.0 %    MCV 99.7 (H) 81.4 - 97.8 fL    MCH 32.5 27.0 - 33.0 pg    MCHC 32.6 (L) 33.6 - 35.0 g/dL    RDW 43.1 35.9 - 50.0 fL    Platelet Count 190 164 - 446 K/uL    MPV 8.9 (L) 9.0 - 12.9 fL    Neutrophils-Polys 68.10 44.00 - 72.00 %    Lymphocytes 21.70 (L) 22.00 - 41.00 %    Monocytes 6.70 0.00 - 13.40  %    Eosinophils 2.70 0.00 - 6.90 %    Basophils 0.30 0.00 - 1.80 %    Immature Granulocytes 0.50 0.00 - 0.90 %    Nucleated RBC 0.00 /100 WBC    Neutrophils (Absolute) 3.98 2.00 - 7.15 K/uL    Lymphs (Absolute) 1.27 1.00 - 4.80 K/uL    Monos (Absolute) 0.39 0.00 - 0.85 K/uL    Eos (Absolute) 0.16 0.00 - 0.51 K/uL    Baso (Absolute) 0.02 0.00 - 0.12 K/uL    Immature Granulocytes (abs) 0.03 0.00 - 0.11 K/uL    NRBC (Absolute) 0.00 K/uL   Basic Metabolic Panel    Collection Time: 10/30/22  6:43 AM   Result Value Ref Range    Sodium 138 135 - 145 mmol/L    Potassium 3.9 3.6 - 5.5 mmol/L    Chloride 102 96 - 112 mmol/L    Co2 26 20 - 33 mmol/L    Glucose 87 65 - 99 mg/dL    Bun 10 8 - 22 mg/dL    Creatinine 0.67 0.50 - 1.40 mg/dL    Calcium 8.4 (L) 8.5 - 10.5 mg/dL    Anion Gap 10.0 7.0 - 16.0   ESTIMATED GFR    Collection Time: 10/30/22  6:43 AM   Result Value Ref Range    GFR (CKD-EPI) 112 >60 mL/min/1.73 m 2   CBC WITH DIFFERENTIAL    Collection Time: 10/31/22  5:40 AM   Result Value Ref Range    WBC 4.0 (L) 4.8 - 10.8 K/uL    RBC 3.32 (L) 4.20 - 5.40 M/uL    Hemoglobin 10.7 (L) 12.0 - 16.0 g/dL    Hematocrit 33.0 (L) 37.0 - 47.0 %    MCV 99.4 (H) 81.4 - 97.8 fL    MCH 32.2 27.0 - 33.0 pg    MCHC 32.4 (L) 33.6 - 35.0 g/dL    RDW 44.0 35.9 - 50.0 fL    Platelet Count 204 164 - 446 K/uL    MPV 8.9 (L) 9.0 - 12.9 fL    Neutrophils-Polys 49.00 44.00 - 72.00 %    Lymphocytes 35.20 22.00 - 41.00 %    Monocytes 9.60 0.00 - 13.40 %    Eosinophils 5.10 0.00 - 6.90 %    Basophils 0.80 0.00 - 1.80 %    Immature Granulocytes 0.30 0.00 - 0.90 %    Nucleated RBC 0.00 /100 WBC    Neutrophils (Absolute) 1.94 (L) 2.00 - 7.15 K/uL    Lymphs (Absolute) 1.39 1.00 - 4.80 K/uL    Monos (Absolute) 0.38 0.00 - 0.85 K/uL    Eos (Absolute) 0.20 0.00 - 0.51 K/uL    Baso (Absolute) 0.03 0.00 - 0.12 K/uL    Immature Granulocytes (abs) 0.01 0.00 - 0.11 K/uL    NRBC (Absolute) 0.00 K/uL         ASSESSMENT:  Patient is a 42 y.o. 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     [facility] Code / Diagnosis to Support: 0014.9 - Major Multiple Trauma: Other Multiple Trauma    Rehabilitation: Impaired ADLs and mobility  Patient is a good candidate for inpatient rehab based on needs for PT, OT.  Patient will also benefit from family training.  Patient has a good discharge situation which will be home with spouse.     Barriers to transfer include: Insurance authorization, TCCs to verify disposition, medical clearance and bed availability     All cases are subject to administrative review and recommendations may change    Disposition recommendations:  - Good candidate for IPR.   - TCC to submit to  for prior authorization for IPR   -PMR to follow in the periphery for rehab appropriateness, please reach out with questions or request for medical management    Medical Complexity:    Right arm weakness   - Suspect brachial plexus upper trunk stretch is the etiology from the boulder strike  - EMG as outpatient will confirm this   - Primary team will image the brachial plexus to look for physical changes in the area   - Continue PT/OT while in house  - Increasing gabapentin to 500mg TID. High risk medication, labs reviewed, CrCl 105.3, .     Polytrauma  - BL rib fractures, left fibula fracture, left hip dislocation, small but stable posterior parietal SDH, C6 spinous process fracture, L4 compression fracture  - WBAT, posterior hip ppx, C-collar at all times, LSO when OOB   - Pain control per primary team   - on Keppra for seizure ppx  - PT/OT while in house   - Plan for IPR     Anemia   - HGB dropping, now 10.7  - etiology somewhat unknown, she did not have surgery. Some blood loss on the scene but admission hgb was WNL    - Monitor daily with CBC   - Consider iron supplementation       DVT PPX: lovenox       Thank you  for allowing us to participate in the care of this patient.   Tahir Jean, DO   Physical Medicine and Rehabilitation     Please note that this dictation was created using voice recognition software. I have made every reasonable attempt to correct obvious errors, but there may be errors of grammar and possibly content that I did not discover before finalizing the note.

## 2022-10-31 NOTE — PROGRESS NOTES
Trauma / Surgical Daily Progress Note    Date of Service  10/31/2022    Chief Complaint  42 y.o. 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    Interval Events  Tearful this morning, slept well but behind on pain management, mobilized for the first time yesterday and showered  Injuries and plan of care discussed at length with pt and family    - MR right shoulder to evaluate for brachial plexus injury  - Will awaiting physiatry recs before adjusting multimodal pain meds  - Working toward IRF transfer    Review of Systems  Review of Systems   Constitutional:  Positive for malaise/fatigue. Negative for chills and fever.   HENT:  Negative for hearing loss.    Eyes:  Negative for blurred vision and double vision.   Respiratory:  Negative for shortness of breath.    Cardiovascular:  Negative for chest pain.   Gastrointestinal:  Positive for constipation (BM prior to arrival). Negative for abdominal pain, nausea and vomiting.   Musculoskeletal:  Positive for back pain, joint pain, myalgias and neck pain.   Skin:  Negative for rash.   Neurological:  Positive for sensory change (RUE) and focal weakness (RUE). Negative for dizziness, tingling, speech change and headaches.      Vital Signs  Temp:  [35.9 °C (96.7 °F)-36.9 °C (98.4 °F)] 36.4 °C (97.6 °F)  Pulse:  [75-88] 81  Resp:  [14-16] 15  BP: ()/(63-69) 103/69  SpO2:  [93 %-98 %] 95 %    Physical Exam  Physical Exam  Vitals and nursing note reviewed. Exam conducted with a chaperone present (family at bedside).   Constitutional:       General: She is awake. She is not in acute distress.     Appearance: She is well-developed. She is not ill-appearing.      Interventions: Cervical collar in  place.   HENT:      Head: Normocephalic.      Comments: Scattered facial abrasions     Nose: Signs of injury and nasal tenderness present.      Mouth/Throat:      Mouth: Mucous membranes are moist.      Pharynx: Oropharynx is clear.   Eyes:      Pupils: Pupils are equal, round, and reactive to light.   Pulmonary:      Effort: Pulmonary effort is normal. No respiratory distress.   Musculoskeletal:         General: Swelling, tenderness and signs of injury present.      Left lower leg: Edema present.      Comments: Moves all extremities   Skin:     General: Skin is warm and dry.      Capillary Refill: Capillary refill takes less than 2 seconds.      Comments: Scattered abrasions and ecchymosis   Neurological:      Mental Status: She is alert.      GCS: GCS eye subscore is 4. GCS verbal subscore is 5. GCS motor subscore is 6.      Motor: Weakness present.      Comments: RUE weakness, no bicep, 1+ tricep, 2+ wrist/   Psychiatric:         Mood and Affect: Mood normal. Affect is tearful.         Behavior: Behavior normal. Behavior is cooperative.       Laboratory  Recent Results (from the past 24 hour(s))   CBC WITH DIFFERENTIAL    Collection Time: 10/31/22  5:40 AM   Result Value Ref Range    WBC 4.0 (L) 4.8 - 10.8 K/uL    RBC 3.32 (L) 4.20 - 5.40 M/uL    Hemoglobin 10.7 (L) 12.0 - 16.0 g/dL    Hematocrit 33.0 (L) 37.0 - 47.0 %    MCV 99.4 (H) 81.4 - 97.8 fL    MCH 32.2 27.0 - 33.0 pg    MCHC 32.4 (L) 33.6 - 35.0 g/dL    RDW 44.0 35.9 - 50.0 fL    Platelet Count 204 164 - 446 K/uL    MPV 8.9 (L) 9.0 - 12.9 fL    Neutrophils-Polys 49.00 44.00 - 72.00 %    Lymphocytes 35.20 22.00 - 41.00 %    Monocytes 9.60 0.00 - 13.40 %    Eosinophils 5.10 0.00 - 6.90 %    Basophils 0.80 0.00 - 1.80 %    Immature Granulocytes 0.30 0.00 - 0.90 %    Nucleated RBC 0.00 /100 WBC    Neutrophils (Absolute) 1.94 (L) 2.00 - 7.15 K/uL    Lymphs (Absolute) 1.39 1.00 - 4.80 K/uL    Monos (Absolute) 0.38 0.00 - 0.85 K/uL    Eos (Absolute) 0.20  0.00 - 0.51 K/uL    Baso (Absolute) 0.03 0.00 - 0.12 K/uL    Immature Granulocytes (abs) 0.01 0.00 - 0.11 K/uL    NRBC (Absolute) 0.00 K/uL   Basic Metabolic Panel    Collection Time: 10/31/22  5:40 AM   Result Value Ref Range    Sodium 137 135 - 145 mmol/L    Potassium 3.7 3.6 - 5.5 mmol/L    Chloride 102 96 - 112 mmol/L    Co2 25 20 - 33 mmol/L    Glucose 109 (H) 65 - 99 mg/dL    Bun 11 8 - 22 mg/dL    Creatinine 0.71 0.50 - 1.40 mg/dL    Calcium 8.4 (L) 8.5 - 10.5 mg/dL    Anion Gap 10.0 7.0 - 16.0   MAGNESIUM    Collection Time: 10/31/22  5:40 AM   Result Value Ref Range    Magnesium 2.0 1.5 - 2.5 mg/dL   PHOSPHORUS    Collection Time: 10/31/22  5:40 AM   Result Value Ref Range    Phosphorus 4.1 2.5 - 4.5 mg/dL   ESTIMATED GFR    Collection Time: 10/31/22  5:40 AM   Result Value Ref Range    GFR (CKD-EPI) 109 >60 mL/min/1.73 m 2       Fluids  No intake or output data in the 24 hours ending 10/31/22 0927    Core Measures & Quality Metrics  Labs reviewed, Medications reviewed and Radiology images reviewed  Castillo catheter: Urinary Tract Retention or Urinary Tract Obstruction      DVT Prophylaxis: Enoxaparin (Lovenox)  DVT prophylaxis - mechanical: SCDs  Ulcer prophylaxis: Not indicated    Assessed for rehab: Patient was assess for and/or received rehabilitation services during this hospitalization  RAP Score Total: 9  CAGE Results: negative Blood Alcohol>0.08: no     Assessment/Plan  Weakness of right upper extremity- (present on admission)  Assessment & Plan  10/31 MR shoulder to evaluate for brachial plexus injury.    Lumbar compression fracture, closed, initial encounter ([facility])- (present on admission)  Assessment & Plan  Left L4 superior facet fracture. Fracture through superolateral L4 superior endplate. Right T12, L1, L2, and L3 transverse process fractures.  10/29 MR completed, awaiting neurosurgery review.  Non-operative management.  Mobilize as tolerated with bracing. LSO, may don/doff at edge of  bed.  Josias Schreiber MD. Neurosurgeon. [facility].    Traumatic subdural hemorrhage, initial encounter- (present on admission)  Assessment & Plan  3.3 mm left posterior subdural hematoma.  mBIG1.  Repeat interval CT imaging brain demonstrated no significant change or progression.  Non-operative management.  Post traumatic pharmacologic seizure prophylaxis for 1 week.  Speech Language Pathology cognitive evaluation.  Josias Schreiber MD. Neurosurgeon. [facility].    Fracture of cervical spinous process, initial encounter (HCC)- (present on admission)  Assessment & Plan  C6 spinous process fracture.  10/29 MR completed, awaiting neurosurgery review.  Cervical spine immobilization.  Josias Schreiber MD. Neurosurgeon. [facility].    Closed fracture of multiple ribs of both sides- (present on admission)  Assessment & Plan  Posterior left 10th and 11th rib fractures. Right posterior third rib fracture. Right posterior 12th rib fracture at costovertebral junction.  Aggressive pulmonary hygiene and multimodal pain management.    Urinary retention- (present on admission)  Assessment & Plan  10/28 Castillo placed for retention.  Continue Castillo until mobilizing more.    Acute pain of right shoulder- (present on admission)  Assessment & Plan  Right shoulder pain/numbness. Motor function intact.  Plain film negative for fracture.  Analgesia.    Closed fracture of distal end of left fibula- (present on admission)  Assessment & Plan  Left distal fibula fracture.  Non-operative management.  Weight bearing status - Weightbearing as tolerated LLE in air splint.  Follow up 2-4 weeks.  Ministerio Watts MD. Orthopedic Surgeon. [facility].    Hip dislocation, left, initial encounter (HCC)- (present on admission)  Assessment & Plan  Left hip dislocation.  Reduced in ED.  Non-operative management.  Weight bearing status - Weightbearing as tolerated LLE.  Posterior hip precautions.  Follow up  2-4 weeks.  Ministerio Watts MD. Orthopedic Surgeon. [facility].    Discharge planning issues- (present on admission)  Assessment & Plan  Date of admission: 10/28/2022.  10/29 Transfer orders from SICU.  10/30 Rehab referral.  Workman's Comp case.  Cleared for discharge: No.  Discharge delayed: No.  Discharge date: tbd.    No contraindication to deep vein thrombosis (DVT) prophylaxis- (present on admission)  Assessment & Plan  Prophylactic anticoagulation for thrombotic prevention initially contraindicated secondary to elevated bleeding risk.  10/29 Prophylactic dose enoxaparin initiated.     Nasal septum fracture, closed, initial encounter- (present on admission)  Assessment & Plan  Bilateral nasal bone fractures.  Non operative management.    Abnormal finding on lung imaging- (present on admission)  Assessment & Plan  Incidental finding of 5 mm right lower lobe pulmonary nodule.  Follow up with PCP.    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   Trauma Green [employer].  Alex Bryant MD. Trauma Surgery.      Discussed patient condition with Family, RN, , , Patient, and physiatry and trauma surgery. Dr. Jean and Dr. Monge

## 2022-10-31 NOTE — DISCHARGE PLANNING
Spoke with  Joey,  He is willing and capable of support of patient  upon being discharged back into the community.   reports that they are also flying in their daughter to help.  2-3 steps to enter the home.

## 2022-11-01 PROBLEM — S14.3XXA BRACHIAL PLEXUS INJURY: Status: ACTIVE | Noted: 2022-11-01

## 2022-11-01 LAB
ANION GAP SERPL CALC-SCNC: 11 MMOL/L (ref 7–16)
BASOPHILS # BLD AUTO: 0.1 % (ref 0–1.8)
BASOPHILS # BLD: 0.01 K/UL (ref 0–0.12)
BUN SERPL-MCNC: 12 MG/DL (ref 8–22)
CALCIUM SERPL-MCNC: 8.8 MG/DL (ref 8.5–10.5)
CHLORIDE SERPL-SCNC: 103 MMOL/L (ref 96–112)
CO2 SERPL-SCNC: 25 MMOL/L (ref 20–33)
CREAT SERPL-MCNC: 0.53 MG/DL (ref 0.5–1.4)
EOSINOPHIL # BLD AUTO: 0 K/UL (ref 0–0.51)
EOSINOPHIL NFR BLD: 0 % (ref 0–6.9)
ERYTHROCYTE [DISTWIDTH] IN BLOOD BY AUTOMATED COUNT: 42.2 FL (ref 35.9–50)
GFR SERPLBLD CREATININE-BSD FMLA CKD-EPI: 118 ML/MIN/1.73 M 2
GLUCOSE SERPL-MCNC: 113 MG/DL (ref 65–99)
HCT VFR BLD AUTO: 36 % (ref 37–47)
HGB BLD-MCNC: 12.1 G/DL (ref 12–16)
IMM GRANULOCYTES # BLD AUTO: 0.03 K/UL (ref 0–0.11)
IMM GRANULOCYTES NFR BLD AUTO: 0.4 % (ref 0–0.9)
LYMPHOCYTES # BLD AUTO: 0.71 K/UL (ref 1–4.8)
LYMPHOCYTES NFR BLD: 9.8 % (ref 22–41)
MCH RBC QN AUTO: 32.7 PG (ref 27–33)
MCHC RBC AUTO-ENTMCNC: 33.6 G/DL (ref 33.6–35)
MCV RBC AUTO: 97.3 FL (ref 81.4–97.8)
MONOCYTES # BLD AUTO: 0.47 K/UL (ref 0–0.85)
MONOCYTES NFR BLD AUTO: 6.5 % (ref 0–13.4)
NEUTROPHILS # BLD AUTO: 6.01 K/UL (ref 2–7.15)
NEUTROPHILS NFR BLD: 83.2 % (ref 44–72)
NRBC # BLD AUTO: 0 K/UL
NRBC BLD-RTO: 0 /100 WBC
PLATELET # BLD AUTO: 278 K/UL (ref 164–446)
PMV BLD AUTO: 8.7 FL (ref 9–12.9)
POTASSIUM SERPL-SCNC: 3.7 MMOL/L (ref 3.6–5.5)
RBC # BLD AUTO: 3.7 M/UL (ref 4.2–5.4)
SODIUM SERPL-SCNC: 139 MMOL/L (ref 135–145)
WBC # BLD AUTO: 7.2 K/UL (ref 4.8–10.8)

## 2022-11-01 PROCEDURE — 36415 COLL VENOUS BLD VENIPUNCTURE: CPT

## 2022-11-01 PROCEDURE — 770001 HCHG ROOM/CARE - MED/SURG/GYN PRIV*

## 2022-11-01 PROCEDURE — 700111 HCHG RX REV CODE 636 W/ 250 OVERRIDE (IP): Performed by: NEUROLOGICAL SURGERY

## 2022-11-01 PROCEDURE — 700111 HCHG RX REV CODE 636 W/ 250 OVERRIDE (IP): Performed by: SURGERY

## 2022-11-01 PROCEDURE — 80048 BASIC METABOLIC PNL TOTAL CA: CPT

## 2022-11-01 PROCEDURE — A9270 NON-COVERED ITEM OR SERVICE: HCPCS | Performed by: NURSE PRACTITIONER

## 2022-11-01 PROCEDURE — 700102 HCHG RX REV CODE 250 W/ 637 OVERRIDE(OP): Performed by: SURGERY

## 2022-11-01 PROCEDURE — 97530 THERAPEUTIC ACTIVITIES: CPT | Mod: CQ

## 2022-11-01 PROCEDURE — 306015 LOCK STAT FOLEY: Performed by: SURGERY

## 2022-11-01 PROCEDURE — 700101 HCHG RX REV CODE 250: Performed by: NURSE PRACTITIONER

## 2022-11-01 PROCEDURE — 85025 COMPLETE CBC W/AUTO DIFF WBC: CPT

## 2022-11-01 PROCEDURE — 700102 HCHG RX REV CODE 250 W/ 637 OVERRIDE(OP): Performed by: NURSE PRACTITIONER

## 2022-11-01 PROCEDURE — 99232 SBSQ HOSP IP/OBS MODERATE 35: CPT | Performed by: NURSE PRACTITIONER

## 2022-11-01 PROCEDURE — A9270 NON-COVERED ITEM OR SERVICE: HCPCS | Performed by: SURGERY

## 2022-11-01 RX ORDER — DEXAMETHASONE 4 MG/1
4 TABLET ORAL EVERY 12 HOURS
Status: DISCONTINUED | OUTPATIENT
Start: 2022-11-01 | End: 2022-11-01

## 2022-11-01 RX ORDER — DEXAMETHASONE 4 MG/1
4 TABLET ORAL EVERY 12 HOURS
Status: DISCONTINUED | OUTPATIENT
Start: 2022-11-03 | End: 2022-11-02 | Stop reason: HOSPADM

## 2022-11-01 RX ORDER — DEXAMETHASONE 4 MG/1
10 TABLET ORAL EVERY 6 HOURS
Status: DISPENSED | OUTPATIENT
Start: 2022-11-01 | End: 2022-11-01

## 2022-11-01 RX ORDER — DEXAMETHASONE 4 MG/1
4 TABLET ORAL EVERY 6 HOURS
Status: DISCONTINUED | OUTPATIENT
Start: 2022-11-01 | End: 2022-11-01

## 2022-11-01 RX ORDER — DEXAMETHASONE 4 MG/1
2 TABLET ORAL EVERY 12 HOURS
Status: DISCONTINUED | OUTPATIENT
Start: 2022-11-04 | End: 2022-11-02 | Stop reason: HOSPADM

## 2022-11-01 RX ORDER — DEXAMETHASONE 4 MG/1
10 TABLET ORAL EVERY 12 HOURS
Status: COMPLETED | OUTPATIENT
Start: 2022-11-01 | End: 2022-11-01

## 2022-11-01 RX ORDER — DEXAMETHASONE 4 MG/1
6 TABLET ORAL EVERY 12 HOURS
Status: DISCONTINUED | OUTPATIENT
Start: 2022-11-02 | End: 2022-11-02 | Stop reason: HOSPADM

## 2022-11-01 RX ORDER — DEXAMETHASONE 4 MG/1
2 TABLET ORAL DAILY
Status: DISCONTINUED | OUTPATIENT
Start: 2022-11-06 | End: 2022-11-02 | Stop reason: HOSPADM

## 2022-11-01 RX ADMIN — METAXALONE 800 MG: 800 TABLET ORAL at 04:35

## 2022-11-01 RX ADMIN — ENOXAPARIN SODIUM 30 MG: 30 INJECTION SUBCUTANEOUS at 16:42

## 2022-11-01 RX ADMIN — DOCUSATE SODIUM 100 MG: 100 CAPSULE, LIQUID FILLED ORAL at 16:41

## 2022-11-01 RX ADMIN — SENNOSIDES AND DOCUSATE SODIUM 1 TABLET: 50; 8.6 TABLET ORAL at 20:21

## 2022-11-01 RX ADMIN — ACETAMINOPHEN 650 MG: 325 TABLET, FILM COATED ORAL at 20:21

## 2022-11-01 RX ADMIN — OXYCODONE HYDROCHLORIDE 10 MG: 10 TABLET ORAL at 16:42

## 2022-11-01 RX ADMIN — GABAPENTIN 300 MG: 300 CAPSULE ORAL at 20:21

## 2022-11-01 RX ADMIN — ACETAMINOPHEN 650 MG: 325 TABLET, FILM COATED ORAL at 13:28

## 2022-11-01 RX ADMIN — POLYETHYLENE GLYCOL 3350 1 PACKET: 17 POWDER, FOR SOLUTION ORAL at 16:39

## 2022-11-01 RX ADMIN — DEXAMETHASONE 10 MG: 4 TABLET ORAL at 16:42

## 2022-11-01 RX ADMIN — OXYCODONE HYDROCHLORIDE 10 MG: 10 TABLET ORAL at 20:21

## 2022-11-01 RX ADMIN — DEXAMETHASONE 10 MG: 4 TABLET ORAL at 11:18

## 2022-11-01 RX ADMIN — ACETAMINOPHEN 650 MG: 325 TABLET, FILM COATED ORAL at 16:41

## 2022-11-01 RX ADMIN — ENOXAPARIN SODIUM 30 MG: 30 INJECTION SUBCUTANEOUS at 04:35

## 2022-11-01 RX ADMIN — DOCUSATE SODIUM 100 MG: 100 CAPSULE, LIQUID FILLED ORAL at 04:35

## 2022-11-01 RX ADMIN — POLYETHYLENE GLYCOL 3350 1 PACKET: 17 POWDER, FOR SOLUTION ORAL at 04:35

## 2022-11-01 RX ADMIN — METAXALONE 800 MG: 800 TABLET ORAL at 18:41

## 2022-11-01 RX ADMIN — OXYCODONE HYDROCHLORIDE 10 MG: 10 TABLET ORAL at 08:19

## 2022-11-01 RX ADMIN — METAXALONE 800 MG: 800 TABLET ORAL at 11:18

## 2022-11-01 RX ADMIN — ACETAMINOPHEN 650 MG: 325 TABLET, FILM COATED ORAL at 08:19

## 2022-11-01 RX ADMIN — LEVETIRACETAM 500 MG: 500 TABLET, FILM COATED ORAL at 16:41

## 2022-11-01 RX ADMIN — GABAPENTIN 300 MG: 300 CAPSULE ORAL at 04:35

## 2022-11-01 RX ADMIN — DEXAMETHASONE SODIUM PHOSPHATE 4 MG: 4 INJECTION, SOLUTION INTRA-ARTICULAR; INTRALESIONAL; INTRAMUSCULAR; INTRAVENOUS; SOFT TISSUE at 04:35

## 2022-11-01 RX ADMIN — LEVETIRACETAM 500 MG: 500 TABLET, FILM COATED ORAL at 04:35

## 2022-11-01 RX ADMIN — GABAPENTIN 300 MG: 300 CAPSULE ORAL at 13:28

## 2022-11-01 RX ADMIN — LIDOCAINE 1 PATCH: 50 PATCH TOPICAL at 11:17

## 2022-11-01 ASSESSMENT — ENCOUNTER SYMPTOMS
BACK PAIN: 1
SENSORY CHANGE: 1
CHILLS: 0
HEADACHES: 0
DOUBLE VISION: 0
FEVER: 0
CONSTIPATION: 1
ABDOMINAL PAIN: 0
VOMITING: 0
BLURRED VISION: 0
FOCAL WEAKNESS: 1
NECK PAIN: 1
SHORTNESS OF BREATH: 0
MYALGIAS: 1
NAUSEA: 0
TINGLING: 0
DIZZINESS: 0
SPEECH CHANGE: 0

## 2022-11-01 ASSESSMENT — COGNITIVE AND FUNCTIONAL STATUS - GENERAL
STANDING UP FROM CHAIR USING ARMS: A LITTLE
MOVING TO AND FROM BED TO CHAIR: UNABLE
SUGGESTED CMS G CODE MODIFIER MOBILITY: CM
TURNING FROM BACK TO SIDE WHILE IN FLAT BAD: UNABLE
WALKING IN HOSPITAL ROOM: A LOT
MOVING FROM LYING ON BACK TO SITTING ON SIDE OF FLAT BED: UNABLE
CLIMB 3 TO 5 STEPS WITH RAILING: TOTAL
MOBILITY SCORE: 9

## 2022-11-01 ASSESSMENT — PAIN DESCRIPTION - PAIN TYPE
TYPE: ACUTE PAIN

## 2022-11-01 ASSESSMENT — GAIT ASSESSMENTS
ASSISTIVE DEVICE: FRONT WHEEL WALKER
DEVIATION: ANTALGIC;BRADYKINETIC
GAIT LEVEL OF ASSIST: CONTACT GUARD ASSIST
DISTANCE (FEET): 3

## 2022-11-01 NOTE — PROGRESS NOTES
Trauma / Surgical Daily Progress Note    Date of Service  11/1/2022    Chief Complaint  42 y.o. 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    Interval Events  Just back to bed from being up in chair x 2 hrs followed by a shower, sore but doing ok  MR shoulder and neurosurgery recs reviewed  IV decadron reaction noted, just had first dose of oral steroid    - DC beck  - Add supplements with meals  - Constipation addressed  - Medically cleared for post acute services  - [facility] transfer pending workman's comp authorization    Review of Systems  Review of Systems   Constitutional:  Negative for chills, fever and malaise/fatigue.   HENT:  Negative for hearing loss.    Eyes:  Negative for blurred vision and double vision.   Respiratory:  Negative for shortness of breath.    Cardiovascular:  Negative for chest pain.   Gastrointestinal:  Positive for constipation (BM prior to arrival). Negative for abdominal pain, nausea and vomiting.   Musculoskeletal:  Positive for back pain, joint pain, myalgias and neck pain.   Skin:  Negative for rash.   Neurological:  Positive for sensory change (RUE) and focal weakness (RUE). Negative for dizziness, tingling, speech change and headaches.      Vital Signs  Temp:  [36.1 °C (97 °F)-36.6 °C (97.8 °F)] 36.1 °C (97 °F)  Pulse:  [69-76] 74  Resp:  [15-17] 17  BP: (105-117)/(67-72) 117/72  SpO2:  [93 %-97 %] 97 %    Physical Exam  Physical Exam  Vitals and nursing note reviewed.   Constitutional:       General: She is awake. She is not in acute distress.     Appearance: She is well-developed. She is not ill-appearing.      Interventions: Cervical collar in place.   HENT:      Head: Normocephalic.       Comments: Scattered facial abrasions     Nose: Signs of injury and nasal tenderness present.      Mouth/Throat:      Mouth: Mucous membranes are moist.      Pharynx: Oropharynx is clear.   Eyes:      Pupils: Pupils are equal, round, and reactive to light.   Pulmonary:      Effort: Pulmonary effort is normal. No respiratory distress.   Musculoskeletal:         General: Swelling, tenderness and signs of injury present.      Left lower leg: Edema present.      Comments: Moves all extremities, RUE weakness   Skin:     General: Skin is warm and dry.      Capillary Refill: Capillary refill takes less than 2 seconds.      Comments: Scattered abrasions and ecchymosis   Neurological:      Mental Status: She is alert.      GCS: GCS eye subscore is 4. GCS verbal subscore is 5. GCS motor subscore is 6.      Motor: Weakness (RUE) present.      Comments: RUE weakness, no bicep, 1+ tricep, 2+ wrist/   Psychiatric:         Mood and Affect: Mood normal. Affect is tearful.         Behavior: Behavior normal. Behavior is cooperative.       Laboratory  No results found for this or any previous visit (from the past 24 hour(s)).    Fluids    Intake/Output Summary (Last 24 hours) at 11/1/2022 1141  Last data filed at 11/1/2022 0815  Gross per 24 hour   Intake 300 ml   Output 2150 ml   Net -1850 ml       Core Measures & Quality Metrics  Labs reviewed, Medications reviewed and Radiology images reviewed  Beck Catheter: Trial beck removal.      DVT Prophylaxis: Enoxaparin (Lovenox)  DVT prophylaxis - mechanical: SCDs  Ulcer prophylaxis: Not indicated    Assessed for rehab: Patient was assess for and/or received rehabilitation services during this hospitalization  RAP Score Total: 9  CAGE Results: negative Blood Alcohol>0.08: no     Assessment/Plan  Brachial plexus injury- (present on admission)  Assessment & Plan  Plan film negative for acute fracture.  10/31 MR shoulder completed. Decadron initiated per neurosurgery.  Plan for EMG in 3  weeks if not improving.  Josias Schreiber MD. Neurosurgeon. [facility].    Urinary retention- (present on admission)  Assessment & Plan  10/28 Beck placed for retention.  11/1 Trial beck removal.    Closed fracture of distal end of left fibula- (present on admission)  Assessment & Plan  Left distal fibula fracture.  Non-operative management.  Weight bearing status - Weightbearing as tolerated LLE in air splint.  Follow up 2-4 weeks.  Ministerio Watts MD. Orthopedic Surgeon. [facility].    Hip dislocation, left, initial encounter ([facility])- (present on admission)  Assessment & Plan  Left hip dislocation.  Reduced in ED.  Non-operative management.  Weight bearing status - Weightbearing as tolerated LLE.  Posterior hip precautions.  Follow up 2-4 weeks.  Ministerio Watts MD. Orthopedic Surgeon. [facility].    Lumbar compression fracture, closed, initial encounter ([facility])- (present on admission)  Assessment & Plan  Left L4 superior facet fracture. Fracture through superolateral L4 superior endplate. Right T12, L1, L2, and L3 transverse process fractures.  10/29 MR completed.  Non-operative management.  Mobilize as tolerated with bracing. LSO, may don/doff at edge of bed.  Josias Schreiber MD. Neurosurgeon. [facility].    Traumatic subdural hemorrhage, initial encounter- (present on admission)  Assessment & Plan  3.3 mm left posterior subdural hematoma.  mBIG1.  Repeat interval CT imaging brain demonstrated no significant change or progression.  Non-operative management.  Post traumatic pharmacologic seizure prophylaxis for 1 week.  10/31 Speech Language Pathology cognitive evaluation completed, no further needs.  Josias Schreiber MD. Neurosurgeon. [facility].    Fracture of cervical spinous process, initial encounter ([facility])- (present on admission)  Assessment & Plan  C6 spinous process fracture.  10/29 MR with interspinous ligamentous stretch injury, no sign of  nerve root injury.  Cervical spine immobilization for 6 weeks.  Josias Schreiber MD. Neurosurgeon. [facility].    Closed fracture of multiple ribs of both sides- (present on admission)  Assessment & Plan  Posterior left 10th and 11th rib fractures. Right posterior third rib fracture. Right posterior 12th rib fracture at costovertebral junction.  Aggressive pulmonary hygiene and multimodal pain management.    Discharge planning issues- (present on admission)  Assessment & Plan  Date of admission: 10/28/2022.  10/29 Transfer orders from SICU.  10/30 Rehab referral.  Workman's Comp case.  Cleared for discharge: Yes - Date: 11/1.  Discharge delayed: No.  Discharge date: tbd.    No contraindication to deep vein thrombosis (DVT) prophylaxis- (present on admission)  Assessment & Plan  Prophylactic anticoagulation for thrombotic prevention initially contraindicated secondary to elevated bleeding risk.  10/29 Prophylactic dose enoxaparin initiated.     Nasal septum fracture, closed, initial encounter- (present on admission)  Assessment & Plan  Bilateral nasal bone fractures.  Non operative management.    Abnormal finding on lung imaging- (present on admission)  Assessment & Plan  Incidental finding of 5 mm right lower lobe pulmonary nodule.  Follow up with PCP.    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   Trauma Green Activation.  Alex Bryant MD. Trauma Surgery.      Discussed patient condition with RN, , , Patient, and trauma surgery. Dr. Monge

## 2022-11-01 NOTE — CARE PLAN
The patient is Stable - Low risk of patient condition declining or worsening    Shift Goals  Clinical Goals: mobility; bowel movement; pain control  Patient Goals: pain control; rest  Family Goals: DONAVON    Progress made toward(s) clinical / shift goals:       Problem: Knowledge Deficit - Standard  Goal: Patient and family/care givers will demonstrate understanding of plan of care, disease process/condition, diagnostic tests and medications  Outcome: Progressing     Problem: Pain - Standard  Goal: Alleviation of pain or a reduction in pain to the patient’s comfort goal  Outcome: Progressing     Problem: Skin Integrity  Goal: Skin integrity is maintained or improved  Outcome: Progressing     Problem: Gastrointestinal Irritability  Goal: Nausea and vomiting will be absent or improve  Outcome: Progressing

## 2022-11-01 NOTE — DISCHARGE PLANNING
Patient is medically cleared per APRN Skyler. Pending authorization from worker's comp, worker's comp is waiting for C4 form to confirm approval. TCC will continue to follow.

## 2022-11-01 NOTE — CARE PLAN
The patient is Stable - Low risk of patient condition declining or worsening    Shift Goals  Clinical Goals: mobility; pain; control; bowle movement  Patient Goals: pain control; rest  Family Goals: DONAVON    Progress made toward(s) clinical / shift goals:    Problem: Knowledge Deficit - Standard  Goal: Patient and family/care givers will demonstrate understanding of plan of care, disease process/condition, diagnostic tests and medications  Outcome: Progressing     Problem: Pain - Standard  Goal: Alleviation of pain or a reduction in pain to the patient’s comfort goal  Outcome: Progressing     Problem: Skin Integrity  Goal: Skin integrity is maintained or improved  Outcome: Progressing       Patient is not progressing towards the following goals:

## 2022-11-01 NOTE — DISCHARGE PLANNING
Case Management Discharge Planning    Admission Date: 10/28/2022  GMLOS: 2.9  ALOS: 4    6-Clicks ADL Score: 13  6-Clicks Mobility Score: 9  PT and/or OT Eval ordered: Yes  Post-acute Referrals Ordered: Yes  Post-acute Choice Obtained: Yes  Has referral(s) been sent to post-acute provider:  Yes      Anticipated Discharge Dispo: Discharge Disposition: Disch to  rehab facility or distinct part unit (62)  Discharge Address: [address] [place] [id number]  Discharge Contact Phone Number: [phone]    DME Needed: No    Action(s) Taken: Updated Provider/Nurse on Discharge Plan    Escalations Completed: None    Medically Clear: No    Next Steps: Rehab can accept but barrier to discharge is C4. Have notified ER PAR supervisor.    Barriers to Discharge: Medical clearance and Pending Placement    Is the patient up for discharge tomorrow: No

## 2022-11-01 NOTE — PROGRESS NOTES
Neurosurgery Progress Note    Subjective:  No events   Sitting in chair  C collar and LSO   RUE numbness    Exam:  AAO,   RUE unable to lift arm up to shoulder level, bicep/tricep 2/5,  2/5  LUE /tricep/bicep 5/5   RLE 5/5  LLE IP/quad 5/5, pf/df 4/5 Left ankle brace   Castillo     BP  Min: 105/68  Max: 117/72  Pulse  Av.5  Min: 69  Max: 76  Resp  Avg: 15.8  Min: 15  Max: 17  Temp  Av.3 °C (97.4 °F)  Min: 36.1 °C (97 °F)  Max: 36.6 °C (97.8 °F)  SpO2  Av.3 %  Min: 93 %  Max: 97 %    No data recorded    Recent Labs     10/30/22  0643 10/31/22  0540   WBC 5.9 4.0*   RBC 3.48* 3.32*   HEMOGLOBIN 11.3* 10.7*   HEMATOCRIT 34.7* 33.0*   MCV 99.7* 99.4*   MCH 32.5 32.2   MCHC 32.6* 32.4*   RDW 43.1 44.0   PLATELETCT 190 204   MPV 8.9* 8.9*       Recent Labs     10/30/22  0643 10/31/22  0540   SODIUM 138 137   POTASSIUM 3.9 3.7   CHLORIDE 102 102   CO2 26 25   GLUCOSE 87 109*   BUN 10 11   CREATININE 0.67 0.71   CALCIUM 8.4* 8.4*                   Intake/Output                         10/31/22 0700 - 2259 22 - 22 0659      Total  Total                 Intake    P.O.  --  300 300  --  -- --    P.O. -- 300 300 -- -- --    Total Intake -- 300 300 -- -- --       Output    Urine  900  600 1500  650  -- 650    Output (mL) (Urethral Catheter)  650 -- 650    Total Output  650 -- 650       Net I/O     -900 -300 -1200 -650 -- -650              Intake/Output Summary (Last 24 hours) at 2022 0908  Last data filed at 2022 0815  Gross per 24 hour   Intake 300 ml   Output 2150 ml   Net -1850 ml               dexamethasone  4 mg Q6HRS    gabapentin  300 mg Q8HRS    lidocaine  1-3 Patch Q24HRS    levETIRAcetam  500 mg BID    Respiratory Therapy Consult   Continuous RT    Pharmacy Consult Request  1 Each PHARMACY TO DOSE    ondansetron  4 mg Q4HRS PRN    docusate sodium  100 mg BID    senna-docusate  1 Tablet Nightly     senna-docusate  1 Tablet Q24HRS PRN    polyethylene glycol/lytes  1 Packet BID    bisacodyl  10 mg Q24HRS PRN    sodium phosphate  1 Each Once PRN    enoxaparin (LOVENOX) injection  30 mg Q12HRS    oxyCODONE immediate-release  5 mg Q3HRS PRN    Or    oxyCODONE immediate-release  10 mg Q3HRS PRN    metaxalone  800 mg TID    acetaminophen  650 mg 4X/DAY       Assessment and Plan:  Hospital day #4  POD #NA   Prophylactic anticoagulation: yes         Start date/time: 10/31    Plan:  Stable neuro  MRI Cspine  interspinous lig stretch injury no overt Fx aside form C6 sp, no sign of nerve root injury on scan possible brachial plexus stretch injury would perform EMG in 3 weeks if not improving   Shoulder MRI: possible nondisplaced scapular body fracture with strained/contusion of the trapezius, deltoid, supra/infraspinatus, subscapularis muscles    Cont. C collar at all times for igamentous injury will clear in 6 weeks possible down grade to soft collar in 2 weeks if pain subsides   LSO when OOB   PT/OT   Pain control per trauma   Cont Keppra   Was placed on IV decadron yesterday, but some tingling/burning sensation in the arms and groin when admn, short lasting  Will try oral x 5days wean steroid, if symptoms continues, we can dc steroids  Discussed with MARCELINO Chan to transfer to rehab once accepted

## 2022-11-01 NOTE — PROGRESS NOTES
Straps to pt's size XL LSO  off the shelf Deroyal  back support brace have been shortened  up to assist in better fitting pt's physique. If  any more adjustments are needed next time pt wears brace please do not hesitate to contact the ortho tech team.

## 2022-11-01 NOTE — PROGRESS NOTES
[facility] cervical collar pads have been applied and fitted to 's cervical collar.

## 2022-11-01 NOTE — THERAPY
Physical Therapy   Daily Treatment     Patient Name: Nerissa Dennis  Age:  42 y.o., Sex:  female  Medical Record #: [id number]  Today's Date: 11/1/2022     Precautions  Precautions: Fall Risk;Weight Bearing As Tolerated Left Lower Extremity;Posterior Hip Precautions;Cervical Collar  ;Spinal / Back Precautions ;Lumbosacral Orthosis  Comments: c-collar on AAT, LSO donned EOB, L LE posterior hip precuations s/p closed reduction    Assessment    Pt greeted, unable to recall precautions. Pt ed on spinal precautions, hip precautions, log roll technique and don/dof brace, handouts given. Pt required Paul to log roll to the L and come to sit EOB. Pt req'd assist with don/dof brace; brace appears too large for pt, this therapist contacted traction. Pt able to stand and ambulate 3 feet to transfer to chair w/ FWW and CGA. Pt will continue to benefit from skilled PT to improve functional mobility.     Plan    Continue current treatment plan.    DC Equipment Recommendations: Unable to determine at this time  Discharge Recommendations: Recommend post-acute placement for additional physical therapy services prior to discharge home       11/01/22 0901   Balance   Sitting Balance (Static) Fair   Sitting Balance (Dynamic) Fair -   Standing Balance (Static) Fair -   Standing Balance (Dynamic) Poor +   Weight Shift Sitting Fair   Weight Shift Standing Poor   Skilled Intervention Verbal Cuing;Sequencing;Tactile Cuing   Comments w/ FWW   Gait Analysis   Gait Level Of Assist Contact Guard Assist   Assistive Device Front Wheel Walker   Distance (Feet) 3   # of Times Distance was Traveled 1   Deviation Antalgic;Bradykinetic   Weight Bearing Status WBAT LLE   Skilled Intervention Verbal Cuing;Tactile Cuing;Sequencing   Comments pt able to transfers from bed to chair ambulating 3ft   Bed Mobility    Supine to Sit Minimal Assist   Scooting Standby Assist   Rolling Minimum Assist to Lt.   Skilled Intervention Sequencing;Tactile Cuing;Verbal  Cuing;Postural Facilitation   Comments log roll to left due to shoulder pain. req'd heavy cuing for sequencing. pt remained up in chair.   Functional Mobility   Sit to Stand Contact Guard Assist   Bed, Chair, Wheelchair Transfer Contact Guard Assist   Transfer Method Stand Step   Skilled Intervention Verbal Cuing;Tactile Cuing;Sequencing   Comments Pt unable to use R UE for support with STS or on walker. pt discussing this with neuro at end of PT session   Short Term Goals    Short Term Goal # 1 pt will be able to compelte supine<>Sitting from flat bed with SPV in 6tx in order to dc home   Goal Outcome # 1 goal not met   Short Term Goal # 2 pt will be able to compelte functional transfers with FWW and SPV in 6tx in order to dc home   Goal Outcome # 2 Goal not met   Short Term Goal # 3 pt will be able to ambulate 50ft with FWW and SPV in 6tx in order to dc home   Goal Outcome # 3 Goal not met   Short Term Goal # 4 pt will be able to negotiate 2 steps with min assist in 6tx in order to enter and exit home with spouse   Goal Outcome # 4 Goal not met   Education Group   Spine Precautions Patient Response Patient;Acceptance;Eager;Demonstration;Explanation;Handout;Verbal Demonstration;Action Demonstration   Cervical Precautions Patient Response Patient;Acceptance;Eager;Explanation;Demonstration;Handout;Verbal Demonstration;Action Demonstration   Weight Bearing Status Patient Response Patient;Acceptance;Eager;Explanation;Demonstration;Verbal Demonstration;Action Demonstration   Brace Wear & Care Patient Response Patient;Acceptance;Eager;Explanation;Demonstration;Handout;Verbal Demonstration;Action Demonstration   Additional Comments pt ed on ankle pumps   Supervising Physical Therapist (PTA Treatments Only)   Supervising Physical Therapist Kimmy Mckeon

## 2022-11-01 NOTE — ASSESSMENT & PLAN NOTE
Plan film negative for acute fracture.  10/31 MR shoulder completed. Decadron initiated per neurosurgery.  Plan for EMG in 3 weeks if not improving.  Right arm weakness.  Josias Schreiber MD. Neurosurgeon. [facility].

## 2022-11-02 ENCOUNTER — HOSPITAL ENCOUNTER (INPATIENT)
Facility: REHABILITATION | Age: 42
LOS: 9 days | DRG: 950 | End: 2022-11-11
Attending: PHYSICAL MEDICINE & REHABILITATION | Admitting: PHYSICAL MEDICINE & REHABILITATION
Payer: COMMERCIAL

## 2022-11-02 VITALS
RESPIRATION RATE: 16 BRPM | BODY MASS INDEX: 25.69 KG/M2 | TEMPERATURE: 97.5 F | HEART RATE: 65 BPM | SYSTOLIC BLOOD PRESSURE: 105 MMHG | WEIGHT: 159.83 LBS | OXYGEN SATURATION: 97 % | DIASTOLIC BLOOD PRESSURE: 67 MMHG | HEIGHT: 66 IN

## 2022-11-02 DIAGNOSIS — T14.90XA TRAUMA: ICD-10-CM

## 2022-11-02 DIAGNOSIS — T07.XXXA MULTIPLE TRAUMA: ICD-10-CM

## 2022-11-02 PROBLEM — R33.9 URINARY RETENTION: Status: RESOLVED | Noted: 2022-10-29 | Resolved: 2022-11-02

## 2022-11-02 PROBLEM — R29.898 WEAKNESS OF RIGHT UPPER EXTREMITY: Status: RESOLVED | Noted: 2022-10-31 | Resolved: 2022-11-02

## 2022-11-02 LAB
ANION GAP SERPL CALC-SCNC: 9 MMOL/L (ref 7–16)
BASOPHILS # BLD AUTO: 0.1 % (ref 0–1.8)
BASOPHILS # BLD: 0.01 K/UL (ref 0–0.12)
BUN SERPL-MCNC: 13 MG/DL (ref 8–22)
CALCIUM SERPL-MCNC: 8.9 MG/DL (ref 8.5–10.5)
CHLORIDE SERPL-SCNC: 101 MMOL/L (ref 96–112)
CO2 SERPL-SCNC: 26 MMOL/L (ref 20–33)
CREAT SERPL-MCNC: 0.55 MG/DL (ref 0.5–1.4)
EOSINOPHIL # BLD AUTO: 0 K/UL (ref 0–0.51)
EOSINOPHIL NFR BLD: 0 % (ref 0–6.9)
ERYTHROCYTE [DISTWIDTH] IN BLOOD BY AUTOMATED COUNT: 43 FL (ref 35.9–50)
FLUAV RNA SPEC QL NAA+PROBE: NEGATIVE
FLUBV RNA SPEC QL NAA+PROBE: NEGATIVE
GFR SERPLBLD CREATININE-BSD FMLA CKD-EPI: 117 ML/MIN/1.73 M 2
GLUCOSE SERPL-MCNC: 125 MG/DL (ref 65–99)
HCT VFR BLD AUTO: 32.9 % (ref 37–47)
HGB BLD-MCNC: 11 G/DL (ref 12–16)
IMM GRANULOCYTES # BLD AUTO: 0.06 K/UL (ref 0–0.11)
IMM GRANULOCYTES NFR BLD AUTO: 0.7 % (ref 0–0.9)
LYMPHOCYTES # BLD AUTO: 1.11 K/UL (ref 1–4.8)
LYMPHOCYTES NFR BLD: 12.4 % (ref 22–41)
MCH RBC QN AUTO: 32.8 PG (ref 27–33)
MCHC RBC AUTO-ENTMCNC: 33.4 G/DL (ref 33.6–35)
MCV RBC AUTO: 98.2 FL (ref 81.4–97.8)
MONOCYTES # BLD AUTO: 0.52 K/UL (ref 0–0.85)
MONOCYTES NFR BLD AUTO: 5.8 % (ref 0–13.4)
NEUTROPHILS # BLD AUTO: 7.28 K/UL (ref 2–7.15)
NEUTROPHILS NFR BLD: 81 % (ref 44–72)
NRBC # BLD AUTO: 0 K/UL
NRBC BLD-RTO: 0 /100 WBC
PLATELET # BLD AUTO: 303 K/UL (ref 164–446)
PMV BLD AUTO: 9 FL (ref 9–12.9)
POTASSIUM SERPL-SCNC: 4.1 MMOL/L (ref 3.6–5.5)
RBC # BLD AUTO: 3.35 M/UL (ref 4.2–5.4)
RSV RNA SPEC QL NAA+PROBE: NEGATIVE
SARS-COV-2 RNA RESP QL NAA+PROBE: NOTDETECTED
SODIUM SERPL-SCNC: 136 MMOL/L (ref 135–145)
SPECIMEN SOURCE: NORMAL
WBC # BLD AUTO: 9 K/UL (ref 4.8–10.8)

## 2022-11-02 PROCEDURE — 700102 HCHG RX REV CODE 250 W/ 637 OVERRIDE(OP): Performed by: NURSE PRACTITIONER

## 2022-11-02 PROCEDURE — 99239 HOSP IP/OBS DSCHRG MGMT >30: CPT | Performed by: NURSE PRACTITIONER

## 2022-11-02 PROCEDURE — A9270 NON-COVERED ITEM OR SERVICE: HCPCS | Performed by: NURSE PRACTITIONER

## 2022-11-02 PROCEDURE — 36415 COLL VENOUS BLD VENIPUNCTURE: CPT

## 2022-11-02 PROCEDURE — 85025 COMPLETE CBC W/AUTO DIFF WBC: CPT

## 2022-11-02 PROCEDURE — 700102 HCHG RX REV CODE 250 W/ 637 OVERRIDE(OP): Performed by: SPECIALIST

## 2022-11-02 PROCEDURE — 94760 N-INVAS EAR/PLS OXIMETRY 1: CPT

## 2022-11-02 PROCEDURE — 700102 HCHG RX REV CODE 250 W/ 637 OVERRIDE(OP): Performed by: SURGERY

## 2022-11-02 PROCEDURE — A9270 NON-COVERED ITEM OR SERVICE: HCPCS | Performed by: PHYSICAL MEDICINE & REHABILITATION

## 2022-11-02 PROCEDURE — A9270 NON-COVERED ITEM OR SERVICE: HCPCS | Performed by: SURGERY

## 2022-11-02 PROCEDURE — 700102 HCHG RX REV CODE 250 W/ 637 OVERRIDE(OP): Performed by: PHYSICAL MEDICINE & REHABILITATION

## 2022-11-02 PROCEDURE — 700111 HCHG RX REV CODE 636 W/ 250 OVERRIDE (IP): Performed by: PHYSICAL MEDICINE & REHABILITATION

## 2022-11-02 PROCEDURE — 700111 HCHG RX REV CODE 636 W/ 250 OVERRIDE (IP): Performed by: SURGERY

## 2022-11-02 PROCEDURE — 302135 SEQUENTIAL COMPRESSION MACHINE: Performed by: SURGERY

## 2022-11-02 PROCEDURE — 0241U HCHG SARS-COV-2 COVID-19 NFCT DS RESP RNA 4 TRGT MIC: CPT

## 2022-11-02 PROCEDURE — 770010 HCHG ROOM/CARE - REHAB SEMI PRIVAT*

## 2022-11-02 PROCEDURE — A9270 NON-COVERED ITEM OR SERVICE: HCPCS | Performed by: SPECIALIST

## 2022-11-02 PROCEDURE — 80048 BASIC METABOLIC PNL TOTAL CA: CPT

## 2022-11-02 RX ORDER — DEXAMETHASONE 4 MG/1
4 TABLET ORAL EVERY 12 HOURS
Status: CANCELLED | OUTPATIENT
Start: 2022-11-03 | End: 2022-11-04

## 2022-11-02 RX ORDER — DEXAMETHASONE 2 MG/1
2 TABLET ORAL EVERY 12 HOURS
Qty: 10 TABLET | Refills: 0 | Status: ON HOLD
Start: 2022-11-04 | End: 2022-11-10

## 2022-11-02 RX ORDER — GABAPENTIN 300 MG/1
300 CAPSULE ORAL 3 TIMES DAILY
Status: DISCONTINUED | OUTPATIENT
Start: 2022-11-02 | End: 2022-11-03

## 2022-11-02 RX ORDER — ONDANSETRON 4 MG/1
4 TABLET, ORALLY DISINTEGRATING ORAL EVERY 4 HOURS PRN
Qty: 10 TABLET | Refills: 0 | Status: ON HOLD
Start: 2022-11-02 | End: 2022-11-10

## 2022-11-02 RX ORDER — DEXAMETHASONE 4 MG/1
6 TABLET ORAL EVERY 12 HOURS
Status: CANCELLED | OUTPATIENT
Start: 2022-11-02 | End: 2022-11-03

## 2022-11-02 RX ORDER — OMEPRAZOLE 20 MG/1
20 CAPSULE, DELAYED RELEASE ORAL DAILY
Status: DISCONTINUED | OUTPATIENT
Start: 2022-11-02 | End: 2022-11-11 | Stop reason: HOSPADM

## 2022-11-02 RX ORDER — M-VIT,TX,IRON,MINS/CALC/FOLIC 27MG-0.4MG
1 TABLET ORAL
Status: DISCONTINUED | OUTPATIENT
Start: 2022-11-03 | End: 2022-11-11 | Stop reason: HOSPADM

## 2022-11-02 RX ORDER — LIDOCAINE 50 MG/G
1-3 PATCH TOPICAL EVERY 24 HOURS
Qty: 10 PATCH | Status: ON HOLD
Start: 2022-11-03 | End: 2022-11-10 | Stop reason: SDUPTHER

## 2022-11-02 RX ORDER — METAXALONE 800 MG/1
800 TABLET ORAL 3 TIMES DAILY
Status: DISCONTINUED | OUTPATIENT
Start: 2022-11-02 | End: 2022-11-11 | Stop reason: HOSPADM

## 2022-11-02 RX ORDER — POLYETHYLENE GLYCOL 3350 17 G/17G
17 POWDER, FOR SOLUTION ORAL 2 TIMES DAILY
Refills: 3 | Status: ON HOLD
Start: 2022-11-02 | End: 2022-11-10 | Stop reason: SDUPTHER

## 2022-11-02 RX ORDER — POLYETHYLENE GLYCOL 3350 17 G/17G
1 POWDER, FOR SOLUTION ORAL DAILY
Status: DISCONTINUED | OUTPATIENT
Start: 2022-11-02 | End: 2022-11-09

## 2022-11-02 RX ORDER — ENOXAPARIN SODIUM 100 MG/ML
30 INJECTION SUBCUTANEOUS EVERY 12 HOURS
Status: DISCONTINUED | OUTPATIENT
Start: 2022-11-02 | End: 2022-11-11 | Stop reason: HOSPADM

## 2022-11-02 RX ORDER — PSEUDOEPHEDRINE HCL 30 MG
100 TABLET ORAL 2 TIMES DAILY
Qty: 60 CAPSULE | Status: ON HOLD
Start: 2022-11-02 | End: 2022-11-10

## 2022-11-02 RX ORDER — LIDOCAINE 50 MG/G
1-3 PATCH TOPICAL EVERY 24 HOURS
Status: CANCELLED | OUTPATIENT
Start: 2022-11-03

## 2022-11-02 RX ORDER — DOCUSATE SODIUM 100 MG/1
100 CAPSULE, LIQUID FILLED ORAL 2 TIMES DAILY
Status: DISCONTINUED | OUTPATIENT
Start: 2022-11-02 | End: 2022-11-04

## 2022-11-02 RX ORDER — DEXAMETHASONE 4 MG/1
6 TABLET ORAL EVERY 12 HOURS
Status: COMPLETED | OUTPATIENT
Start: 2022-11-02 | End: 2022-11-03

## 2022-11-02 RX ORDER — ONDANSETRON 4 MG/1
4 TABLET, ORALLY DISINTEGRATING ORAL 4 TIMES DAILY PRN
Status: DISCONTINUED | OUTPATIENT
Start: 2022-11-02 | End: 2022-11-11 | Stop reason: HOSPADM

## 2022-11-02 RX ORDER — DEXAMETHASONE 6 MG/1
6 TABLET ORAL EVERY 12 HOURS
Qty: 10 TABLET | Refills: 0 | Status: ON HOLD
Start: 2022-11-02 | End: 2022-11-10

## 2022-11-02 RX ORDER — ACETAMINOPHEN 325 MG/1
650 TABLET ORAL 4 TIMES DAILY
Qty: 30 TABLET | Refills: 0 | Status: ON HOLD
Start: 2022-11-02 | End: 2022-11-10 | Stop reason: SDUPTHER

## 2022-11-02 RX ORDER — DEXAMETHASONE 4 MG/1
2 TABLET ORAL EVERY 12 HOURS
Status: CANCELLED | OUTPATIENT
Start: 2022-11-04 | End: 2022-11-05

## 2022-11-02 RX ORDER — ACETAMINOPHEN 500 MG
500 TABLET ORAL EVERY 6 HOURS PRN
Status: DISCONTINUED | OUTPATIENT
Start: 2022-11-02 | End: 2022-11-11 | Stop reason: HOSPADM

## 2022-11-02 RX ORDER — LEVETIRACETAM 500 MG/1
500 TABLET ORAL 2 TIMES DAILY
Qty: 60 TABLET | Status: ON HOLD
Start: 2022-11-02 | End: 2022-11-10

## 2022-11-02 RX ORDER — DEXAMETHASONE 4 MG/1
4 TABLET ORAL EVERY 12 HOURS
Qty: 10 TABLET | Refills: 0 | Status: ON HOLD
Start: 2022-11-03 | End: 2022-11-10

## 2022-11-02 RX ORDER — ALUMINA, MAGNESIA, AND SIMETHICONE 2400; 2400; 240 MG/30ML; MG/30ML; MG/30ML
20 SUSPENSION ORAL
Status: DISCONTINUED | OUTPATIENT
Start: 2022-11-02 | End: 2022-11-11 | Stop reason: HOSPADM

## 2022-11-02 RX ORDER — LEVETIRACETAM 500 MG/1
500 TABLET ORAL 2 TIMES DAILY
Status: COMPLETED | OUTPATIENT
Start: 2022-11-02 | End: 2022-11-05

## 2022-11-02 RX ORDER — DEXAMETHASONE 1 MG
2 TABLET ORAL EVERY 12 HOURS
Status: COMPLETED | OUTPATIENT
Start: 2022-11-04 | End: 2022-11-05

## 2022-11-02 RX ORDER — GABAPENTIN 300 MG/1
300 CAPSULE ORAL EVERY 8 HOURS
Qty: 90 CAPSULE | Status: ON HOLD
Start: 2022-11-02 | End: 2022-11-10 | Stop reason: SDUPTHER

## 2022-11-02 RX ORDER — ENEMA 19; 7 G/133ML; G/133ML
1 ENEMA RECTAL
Status: ON HOLD
Start: 2022-11-02 | End: 2022-11-10

## 2022-11-02 RX ORDER — LEVETIRACETAM 500 MG/1
500 TABLET ORAL 2 TIMES DAILY
Status: CANCELLED | OUTPATIENT
Start: 2022-11-02 | End: 2022-11-05

## 2022-11-02 RX ORDER — DEXAMETHASONE 4 MG/1
2 TABLET ORAL DAILY
Status: CANCELLED | OUTPATIENT
Start: 2022-11-06 | End: 2022-11-07

## 2022-11-02 RX ORDER — ONDANSETRON 2 MG/ML
4 INJECTION INTRAMUSCULAR; INTRAVENOUS 4 TIMES DAILY PRN
Status: DISCONTINUED | OUTPATIENT
Start: 2022-11-02 | End: 2022-11-11 | Stop reason: HOSPADM

## 2022-11-02 RX ORDER — LANOLIN ALCOHOL/MO/W.PET/CERES
3 CREAM (GRAM) TOPICAL
Status: DISCONTINUED | OUTPATIENT
Start: 2022-11-02 | End: 2022-11-04

## 2022-11-02 RX ORDER — ENOXAPARIN SODIUM 100 MG/ML
30 INJECTION SUBCUTANEOUS EVERY 12 HOURS
Status: CANCELLED | OUTPATIENT
Start: 2022-11-02

## 2022-11-02 RX ORDER — BISACODYL 10 MG
10 SUPPOSITORY, RECTAL RECTAL
Refills: 0 | Status: ON HOLD
Start: 2022-11-02 | End: 2022-11-10

## 2022-11-02 RX ORDER — LIDOCAINE 50 MG/G
1-3 PATCH TOPICAL EVERY 24 HOURS
Status: DISCONTINUED | OUTPATIENT
Start: 2022-11-03 | End: 2022-11-11 | Stop reason: HOSPADM

## 2022-11-02 RX ORDER — METAXALONE 800 MG/1
800 TABLET ORAL 3 TIMES DAILY
Qty: 90 TABLET | Status: ON HOLD
Start: 2022-11-02 | End: 2022-11-10 | Stop reason: SDUPTHER

## 2022-11-02 RX ORDER — AMOXICILLIN 250 MG
1 CAPSULE ORAL NIGHTLY
Qty: 30 TABLET | Refills: 0 | Status: ON HOLD
Start: 2022-11-02 | End: 2022-11-10

## 2022-11-02 RX ORDER — METAXALONE 800 MG/1
800 TABLET ORAL 3 TIMES DAILY
Status: CANCELLED | OUTPATIENT
Start: 2022-11-02

## 2022-11-02 RX ORDER — DEXAMETHASONE 2 MG/1
2 TABLET ORAL DAILY
Qty: 10 TABLET | Refills: 0 | Status: ON HOLD
Start: 2022-11-06 | End: 2022-11-10

## 2022-11-02 RX ORDER — AMOXICILLIN 250 MG
1 CAPSULE ORAL
Qty: 30 TABLET | Refills: 0 | Status: ON HOLD
Start: 2022-11-02 | End: 2022-11-10

## 2022-11-02 RX ORDER — OXYCODONE HYDROCHLORIDE 10 MG/1
10 TABLET ORAL
Status: CANCELLED | OUTPATIENT
Start: 2022-11-02

## 2022-11-02 RX ORDER — OXYCODONE HYDROCHLORIDE 5 MG/1
5 TABLET ORAL
Status: CANCELLED | OUTPATIENT
Start: 2022-11-02

## 2022-11-02 RX ORDER — OXYCODONE HYDROCHLORIDE 10 MG/1
10 TABLET ORAL
Status: DISCONTINUED | OUTPATIENT
Start: 2022-11-02 | End: 2022-11-11 | Stop reason: HOSPADM

## 2022-11-02 RX ORDER — ECHINACEA PURPUREA EXTRACT 125 MG
2 TABLET ORAL PRN
Status: DISCONTINUED | OUTPATIENT
Start: 2022-11-02 | End: 2022-11-11 | Stop reason: HOSPADM

## 2022-11-02 RX ORDER — ACETAMINOPHEN 500 MG
1000 TABLET ORAL 3 TIMES DAILY
Status: DISCONTINUED | OUTPATIENT
Start: 2022-11-02 | End: 2022-11-03

## 2022-11-02 RX ORDER — DEXAMETHASONE 4 MG/1
4 TABLET ORAL EVERY 12 HOURS
Status: COMPLETED | OUTPATIENT
Start: 2022-11-03 | End: 2022-11-04

## 2022-11-02 RX ORDER — OXYCODONE HYDROCHLORIDE 5 MG/1
5 TABLET ORAL
Status: DISCONTINUED | OUTPATIENT
Start: 2022-11-02 | End: 2022-11-11 | Stop reason: HOSPADM

## 2022-11-02 RX ORDER — DEXAMETHASONE 1 MG
2 TABLET ORAL DAILY
Status: COMPLETED | OUTPATIENT
Start: 2022-11-06 | End: 2022-11-06

## 2022-11-02 RX ORDER — DOCUSATE SODIUM 100 MG/1
100 CAPSULE, LIQUID FILLED ORAL 2 TIMES DAILY
Status: CANCELLED | OUTPATIENT
Start: 2022-11-02

## 2022-11-02 RX ORDER — OXYCODONE HYDROCHLORIDE 5 MG/1
5 TABLET ORAL
Qty: 30 TABLET | Refills: 0 | Status: ON HOLD
Start: 2022-11-02 | End: 2022-11-10

## 2022-11-02 RX ORDER — ENOXAPARIN SODIUM 100 MG/ML
30 INJECTION SUBCUTANEOUS EVERY 12 HOURS
Status: ON HOLD
Start: 2022-11-02 | End: 2022-11-10

## 2022-11-02 RX ADMIN — OXYCODONE HYDROCHLORIDE 10 MG: 10 TABLET ORAL at 00:58

## 2022-11-02 RX ADMIN — ACETAMINOPHEN 650 MG: 325 TABLET, FILM COATED ORAL at 13:04

## 2022-11-02 RX ADMIN — METAXALONE 800 MG: 800 TABLET ORAL at 04:57

## 2022-11-02 RX ADMIN — POLYETHYLENE GLYCOL 3350 1 PACKET: 17 POWDER, FOR SOLUTION ORAL at 17:49

## 2022-11-02 RX ADMIN — ENOXAPARIN SODIUM 30 MG: 30 INJECTION SUBCUTANEOUS at 04:56

## 2022-11-02 RX ADMIN — METAXALONE 800 MG: 800 TABLET ORAL at 20:17

## 2022-11-02 RX ADMIN — GABAPENTIN 300 MG: 300 CAPSULE ORAL at 13:04

## 2022-11-02 RX ADMIN — LEVETIRACETAM 500 MG: 500 TABLET, FILM COATED ORAL at 04:58

## 2022-11-02 RX ADMIN — METAXALONE 800 MG: 800 TABLET ORAL at 11:03

## 2022-11-02 RX ADMIN — OXYCODONE HYDROCHLORIDE 10 MG: 10 TABLET ORAL at 11:02

## 2022-11-02 RX ADMIN — ACETAMINOPHEN 650 MG: 325 TABLET, FILM COATED ORAL at 08:10

## 2022-11-02 RX ADMIN — OMEPRAZOLE 20 MG: 20 CAPSULE, DELAYED RELEASE ORAL at 17:50

## 2022-11-02 RX ADMIN — DOCUSATE SODIUM 100 MG: 100 CAPSULE, LIQUID FILLED ORAL at 20:17

## 2022-11-02 RX ADMIN — MAGNESIUM HYDROXIDE 30 ML: 400 SUSPENSION ORAL at 04:56

## 2022-11-02 RX ADMIN — MAGNESIUM HYDROXIDE 30 ML: 400 SUSPENSION ORAL at 13:04

## 2022-11-02 RX ADMIN — GABAPENTIN 300 MG: 300 CAPSULE ORAL at 04:58

## 2022-11-02 RX ADMIN — OXYCODONE HYDROCHLORIDE 10 MG: 10 TABLET ORAL at 19:00

## 2022-11-02 RX ADMIN — LEVETIRACETAM 500 MG: 500 TABLET, FILM COATED ORAL at 20:17

## 2022-11-02 RX ADMIN — OXYCODONE HYDROCHLORIDE 10 MG: 10 TABLET ORAL at 04:59

## 2022-11-02 RX ADMIN — GABAPENTIN 300 MG: 300 CAPSULE ORAL at 20:17

## 2022-11-02 RX ADMIN — DEXAMETHASONE 6 MG: 4 TABLET ORAL at 20:18

## 2022-11-02 RX ADMIN — ENOXAPARIN SODIUM 30 MG: 30 INJECTION SUBCUTANEOUS at 20:20

## 2022-11-02 RX ADMIN — ACETAMINOPHEN 1000 MG: 500 TABLET ORAL at 20:16

## 2022-11-02 ASSESSMENT — PAIN DESCRIPTION - PAIN TYPE
TYPE: ACUTE PAIN

## 2022-11-02 ASSESSMENT — LIFESTYLE VARIABLES
HAVE YOU EVER FELT YOU SHOULD CUT DOWN ON YOUR DRINKING: NO
TOTAL SCORE: 0
ALCOHOL_USE: NO
TOTAL SCORE: 0
EVER FELT BAD OR GUILTY ABOUT YOUR DRINKING: NO
HAVE PEOPLE ANNOYED YOU BY CRITICIZING YOUR DRINKING: NO
EVER HAD A DRINK FIRST THING IN THE MORNING TO STEADY YOUR NERVES TO GET RID OF A HANGOVER: NO
TOTAL SCORE: 0
HOW MANY TIMES IN THE PAST YEAR HAVE YOU HAD 5 OR MORE DRINKS IN A DAY: 0
AVERAGE NUMBER OF DAYS PER WEEK YOU HAVE A DRINK CONTAINING ALCOHOL: 0
ON A TYPICAL DAY WHEN YOU DRINK ALCOHOL HOW MANY DRINKS DO YOU HAVE: 0
CONSUMPTION TOTAL: NEGATIVE

## 2022-11-02 ASSESSMENT — PATIENT HEALTH QUESTIONNAIRE - PHQ9
9. THOUGHTS THAT YOU WOULD BE BETTER OFF DEAD, OR OF HURTING YOURSELF: NOT AT ALL
4. FEELING TIRED OR HAVING LITTLE ENERGY: NOT AT ALL
2. FEELING DOWN, DEPRESSED OR HOPELESS: NOT AT ALL
SUM OF ALL RESPONSES TO PHQ QUESTIONS 1-9: 0
8. MOVING OR SPEAKING SO SLOWLY THAT OTHER PEOPLE COULD HAVE NOTICED. OR THE OPPOSITE, BEING SO FIGETY OR RESTLESS THAT YOU HAVE BEEN MOVING AROUND A LOT MORE THAN USUAL: NOT AT ALL
1. LITTLE INTEREST OR PLEASURE IN DOING THINGS: NOT AT ALL
SUM OF ALL RESPONSES TO PHQ9 QUESTIONS 1 & 2: 0
2. FEELING DOWN, DEPRESSED, IRRITABLE, OR HOPELESS: NOT AT ALL
7. TROUBLE CONCENTRATING ON THINGS, SUCH AS READING THE NEWSPAPER OR WATCHING TELEVISION: NOT AT ALL
3. TROUBLE FALLING OR STAYING ASLEEP OR SLEEPING TOO MUCH: NOT AT ALL
5. POOR APPETITE OR OVEREATING: NOT AT ALL
6. FEELING BAD ABOUT YOURSELF - OR THAT YOU ARE A FAILURE OR HAVE LET YOURSELF OR YOUR FAMILY DOWN: NOT AT ALL
1. LITTLE INTEREST OR PLEASURE IN DOING THINGS: NOT AT ALL
SUM OF ALL RESPONSES TO PHQ9 QUESTIONS 1 AND 2: 0

## 2022-11-02 ASSESSMENT — ENCOUNTER SYMPTOMS
RESPIRATORY NEGATIVE: 1
FOCAL WEAKNESS: 1
MYALGIAS: 1
PSYCHIATRIC NEGATIVE: 1
ROS GI COMMENTS: BM PTA

## 2022-11-02 ASSESSMENT — FIBROSIS 4 INDEX: FIB4 SCORE: 1.52

## 2022-11-02 NOTE — DISCHARGE INSTRUCTIONS
Discharge Instructions    Discharged to other by medical transportation with escort. Discharged via ambulance, hospital escort: Yes.  Special equipment needed: C-Collar and TLSO    Be sure to schedule a follow-up appointment with your primary care doctor or any specialists as instructed.     Discharge Plan:   Influenza Vaccine Indication: Patient Refuses    I understand that a diet low in cholesterol, fat, and sodium is recommended for good health. Unless I have been given specific instructions below for another diet, I accept this instruction as my diet prescription.   Other diet: Regular    Special Instructions: Discharge instructions for the Orthopedic Patient    Follow up with Primary Care Physician within 2 weeks of discharge to home, regarding:  Review of medications and diagnostic testing.  Surveillance for medical complications.  Workup and treatment of osteoporosis, if appropriate.     -Is this a Hip/Knee/Shoulder Joint Replacement patient? No    -Is this patient being discharged with medication to prevent blood clots?  No    -Is this patient being discharged with medication to prevent blood clots?  No    Is patient discharged on Warfarin / Coumadin?   No

## 2022-11-02 NOTE — PROGRESS NOTES
Received report from AZRA Agarwal and assumed care of patient at 19:00. A&Ox4, on room air, and reports pain of 7/10. Medicated per MAR. Skin assessed. C-collar on. Q4 neuro checks. Patient is ambulatory with 1 person assist using the FWW to the restroom. No weight restrictions. Bed in the lowest position, wheels locked, and call light within reach. Patient calls appropriately. Hourly rounding provided.

## 2022-11-02 NOTE — DISCHARGE PLANNING
0915: Confirmed with worker's comp that the C4 was received, but  reports the C4 has some errors which they are working to resolve on their end.     Reached out to OT for updated eval. TCC will continue to follow for authorization.     1358: Received authorization from worker's comp, forwarded PAS to Dr Ramirez.    1425: Patient has been accepted by Dr Ramirez at [facility]. Transport set for 1600/1630 GMT stretcher, nurse report #[id number]. Notified attending, bedside nurse, and cm. Contacted spouse Kamran and provided update.
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
## 2022-11-02 NOTE — PROGRESS NOTES
Patient reports numbness to RUE has extended to her fingertips, Previous assessment numbness was to elbow. Sling applied.

## 2022-11-02 NOTE — PROGRESS NOTES
Trauma / Surgical Daily Progress Note    Date of Service  11/2/2022    Chief Complaint  42 y.o. female admitted 10/28/2022 as a trauma green - crush injury - right brachial plexus injury, SDH, lumbar compression fx, cervical fx, left hip dislocation, bilateral post rib fractures, left fibular fracture.    Interval Events  Sad regarding her right arm   Adequate pain control  Voiding post beck removal  IS 1750 cc  Constipation addressed   Rehab pending work comp approval  Medically cleared to attend     Review of Systems  Review of Systems   Constitutional:  Positive for malaise/fatigue.   HENT: Negative.     Respiratory: Negative.     Gastrointestinal:         BM PTA   Genitourinary:         Voiding   Musculoskeletal:  Positive for myalgias.   Neurological:  Positive for focal weakness.   Psychiatric/Behavioral: Negative.     All other systems reviewed and are negative.     Vital Signs  Temp:  [36.3 °C (97.3 °F)-36.7 °C (98 °F)] 36.6 °C (97.9 °F)  Pulse:  [58-79] 58  Resp:  [15-17] 16  BP: (103-118)/(59-80) 118/80  SpO2:  [94 %-98 %] 95 %    Physical Exam  Physical Exam  Vitals and nursing note reviewed.   Constitutional:       General: She is not in acute distress.     Appearance: Normal appearance.      Interventions: Cervical collar in place.   HENT:      Head:      Comments: Various facial abrasions     Right Ear: External ear normal.      Left Ear: External ear normal.      Nose: Nose normal.      Mouth/Throat:      Mouth: Mucous membranes are moist.      Pharynx: Oropharynx is clear.   Eyes:      General:         Right eye: No discharge.         Left eye: No discharge.      Pupils: Pupils are equal, round, and reactive to light.   Cardiovascular:      Pulses: Normal pulses.   Pulmonary:      Effort: Pulmonary effort is normal. No respiratory distress.      Comments: Diminished bibasilar   Chest:      Chest wall: Tenderness present.   Abdominal:      General: There is no distension.      Palpations: Abdomen  is soft.      Tenderness: There is no abdominal tenderness.   Musculoskeletal:      Cervical back: Tenderness present. No muscular tenderness.   Skin:     General: Skin is warm.      Capillary Refill: Capillary refill takes less than 2 seconds.   Neurological:      General: No focal deficit present.      Mental Status: She is alert and oriented to person, place, and time.      Motor: Weakness (right arm weakness, right hand with some movement) present.   Psychiatric:         Mood and Affect: Mood normal.         Behavior: Behavior normal.         Thought Content: Thought content normal.     Core Measures & Quality Metrics  Labs reviewed and Medications reviewed  Beck catheter: No Beck      DVT Prophylaxis: Enoxaparin (Lovenox)  DVT prophylaxis - mechanical: SCDs  Ulcer prophylaxis: Not indicated    Assessed for rehab: Patient was assess for and/or received rehabilitation services during this hospitalization  RAP Score Total: 9  CAGE Results: negative Blood Alcohol>0.08: no     Assessment/Plan  Brachial plexus injury- (present on admission)  Assessment & Plan  Plan film negative for acute fracture.  10/31 MR shoulder completed. Decadron initiated per neurosurgery.  Plan for EMG in 3 weeks if not improving.  Right arm weakness.  Josias Schreiber MD. Neurosurgeon. [facility].    Urinary retention- (present on admission)  Assessment & Plan  10/28 Beck placed for retention.  11/1 Trial ebck removal.    Closed fracture of distal end of left fibula- (present on admission)  Assessment & Plan  Left distal fibula fracture.  Non-operative management.  Weight bearing status - Weightbearing as tolerated LLE in air splint.  Follow up 2-4 weeks.  Ministerio Watts MD. Orthopedic Surgeon. [facility].    Hip dislocation, left, initial encounter (HCC)- (present on admission)  Assessment & Plan  Left hip dislocation.  Reduced in ED.  Non-operative management.  Weight bearing status - Weightbearing as  tolerated LLE.  Posterior hip precautions.  Follow up 2-4 weeks.  Ministerio Watts MD. Orthopedic Surgeon. [facility].    Lumbar compression fracture, closed, initial encounter (HCC)- (present on admission)  Assessment & Plan  Left L4 superior facet fracture. Fracture through superolateral L4 superior endplate. Right T12, L1, L2, and L3 transverse process fractures.  10/29 MR completed.  Non-operative management.  Mobilize as tolerated with bracing. LSO, may don/doff at edge of bed.  Josias Schreiber MD. Neurosurgeon. [facility].    Traumatic subdural hemorrhage, initial encounter- (present on admission)  Assessment & Plan  3.3 mm left posterior subdural hematoma.  mBIG1.  Repeat interval CT imaging brain demonstrated no significant change or progression.  Non-operative management.  Post traumatic pharmacologic seizure prophylaxis for 1 week.  10/31 Speech Language Pathology cognitive evaluation completed, no further needs.  Josias Schreiber MD. Neurosurgeon. [facility].    Fracture of cervical spinous process, initial encounter ([facility])- (present on admission)  Assessment & Plan  C6 spinous process fracture.  10/29 MR with interspinous ligamentous stretch injury, no sign of nerve root injury.  Cervical spine immobilization for 6 weeks.  Josias Schreiber MD. Neurosurgeon. [facility].    Closed fracture of multiple ribs of both sides- (present on admission)  Assessment & Plan  Posterior left 10th and 11th rib fractures. Right posterior third rib fracture. Right posterior 12th rib fracture at costovertebral junction.  Aggressive pulmonary hygiene and multimodal pain management.    Discharge planning issues- (present on admission)  Assessment & Plan  Date of admission: 10/28/2022.  10/29 Transfer orders from SICU.  10/30 Rehab referral.  Workman's Comp case.  Cleared for discharge: Yes - Date: 11/1.  Discharge delayed: No.  Discharge date: tbd.    No contraindication to  deep vein thrombosis (DVT) prophylaxis- (present on admission)  Assessment & Plan  Prophylactic anticoagulation for thrombotic prevention initially contraindicated secondary to elevated bleeding risk.  10/29 Prophylactic dose enoxaparin initiated.     Nasal septum fracture, closed, initial encounter- (present on admission)  Assessment & Plan  Bilateral nasal bone fractures.  Non operative management.    Abnormal finding on lung imaging- (present on admission)  Assessment & Plan  Incidental finding of 5 mm right lower lobe pulmonary nodule.  Follow up with PCP.    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   Trauma Green Activation.  Alex Bryant MD. Trauma Surgery.    Discussed patient condition with RN, Patient, and Dr. Monge .

## 2022-11-02 NOTE — CARE PLAN
The patient is Stable - Low risk of patient condition declining or worsening    Shift Goals  Clinical Goals: Pain management, rest  Patient Goals: Pain management, rest  Family Goals: DONAVON      Problem: Knowledge Deficit - Standard  Goal: Patient and family/care givers will demonstrate understanding of plan of care, disease process/condition, diagnostic tests and medications  Outcome: Progressing     Problem: Pain - Standard  Goal: Alleviation of pain or a reduction in pain to the patient’s comfort goal  Outcome: Progressing     Problem: Skin Integrity  Goal: Skin integrity is maintained or improved  Outcome: Progressing     Problem: Gastrointestinal Irritability  Goal: Nausea and vomiting will be absent or improve  Outcome: Progressing       Progress made toward(s) clinical / shift goals:  Patient updated on the plan of care and all questions answered. No complaints of N/V. Skin and pain assessed. C-collar in place. Medicated per MAR. Hourly rounding provided.

## 2022-11-02 NOTE — CARE PLAN
The patient is Stable - Low risk of patient condition declining or worsening    Shift Goals  Clinical Goals: (P) pain management, mobility, safety  Patient Goals: (P) Pain management, safety  Family Goals: (P) Not present    Progress made toward(s) clinical / shift goals:    Problem: Knowledge Deficit - Standard  Goal: Patient and family/care givers will demonstrate understanding of plan of care, disease process/condition, diagnostic tests and medications  Outcome: Progressing     Problem: Pain - Standard  Goal: Alleviation of pain or a reduction in pain to the patient’s comfort goal  Outcome: Progressing     Problem: Skin Integrity  Goal: Skin integrity is maintained or improved  Outcome: Progressing       Patient is not progressing towards the following goals:

## 2022-11-03 LAB
25(OH)D3 SERPL-MCNC: 12 NG/ML (ref 30–100)
ALBUMIN SERPL BCP-MCNC: 3.6 G/DL (ref 3.2–4.9)
ALBUMIN/GLOB SERPL: 1.4 G/DL
ALP SERPL-CCNC: 77 U/L (ref 30–99)
ALT SERPL-CCNC: 184 U/L (ref 2–50)
ANION GAP SERPL CALC-SCNC: 11 MMOL/L (ref 7–16)
AST SERPL-CCNC: 98 U/L (ref 12–45)
BASOPHILS # BLD AUTO: 0.2 % (ref 0–1.8)
BASOPHILS # BLD: 0.02 K/UL (ref 0–0.12)
BILIRUB SERPL-MCNC: 0.3 MG/DL (ref 0.1–1.5)
BUN SERPL-MCNC: 19 MG/DL (ref 8–22)
CALCIUM SERPL-MCNC: 8.7 MG/DL (ref 8.5–10.5)
CHLORIDE SERPL-SCNC: 104 MMOL/L (ref 96–112)
CO2 SERPL-SCNC: 24 MMOL/L (ref 20–33)
CREAT SERPL-MCNC: 0.55 MG/DL (ref 0.5–1.4)
EOSINOPHIL # BLD AUTO: 0.02 K/UL (ref 0–0.51)
EOSINOPHIL NFR BLD: 0.2 % (ref 0–6.9)
ERYTHROCYTE [DISTWIDTH] IN BLOOD BY AUTOMATED COUNT: 45.4 FL (ref 35.9–50)
FERRITIN SERPL-MCNC: 241 NG/ML (ref 10–291)
GFR SERPLBLD CREATININE-BSD FMLA CKD-EPI: 117 ML/MIN/1.73 M 2
GLOBULIN SER CALC-MCNC: 2.6 G/DL (ref 1.9–3.5)
GLUCOSE SERPL-MCNC: 119 MG/DL (ref 65–99)
HCT VFR BLD AUTO: 35.1 % (ref 37–47)
HGB BLD-MCNC: 11.2 G/DL (ref 12–16)
IMM GRANULOCYTES # BLD AUTO: 0.06 K/UL (ref 0–0.11)
IMM GRANULOCYTES NFR BLD AUTO: 0.7 % (ref 0–0.9)
IRON SATN MFR SERPL: 25 % (ref 15–55)
IRON SERPL-MCNC: 59 UG/DL (ref 40–170)
LYMPHOCYTES # BLD AUTO: 1.17 K/UL (ref 1–4.8)
LYMPHOCYTES NFR BLD: 13.9 % (ref 22–41)
MAGNESIUM SERPL-MCNC: 2.3 MG/DL (ref 1.5–2.5)
MCH RBC QN AUTO: 32.1 PG (ref 27–33)
MCHC RBC AUTO-ENTMCNC: 31.9 G/DL (ref 33.6–35)
MCV RBC AUTO: 100.6 FL (ref 81.4–97.8)
MONOCYTES # BLD AUTO: 0.53 K/UL (ref 0–0.85)
MONOCYTES NFR BLD AUTO: 6.3 % (ref 0–13.4)
NEUTROPHILS # BLD AUTO: 6.64 K/UL (ref 2–7.15)
NEUTROPHILS NFR BLD: 78.7 % (ref 44–72)
NRBC # BLD AUTO: 0 K/UL
NRBC BLD-RTO: 0 /100 WBC
PHOSPHATE SERPL-MCNC: 3.8 MG/DL (ref 2.5–4.5)
PLATELET # BLD AUTO: 320 K/UL (ref 164–446)
PMV BLD AUTO: 9 FL (ref 9–12.9)
POTASSIUM SERPL-SCNC: 4.4 MMOL/L (ref 3.6–5.5)
PREALB SERPL-MCNC: 28.5 MG/DL (ref 18–38)
PROT SERPL-MCNC: 6.2 G/DL (ref 6–8.2)
RBC # BLD AUTO: 3.49 M/UL (ref 4.2–5.4)
SODIUM SERPL-SCNC: 139 MMOL/L (ref 135–145)
TIBC SERPL-MCNC: 237 UG/DL (ref 250–450)
UIBC SERPL-MCNC: 178 UG/DL (ref 110–370)
VIT B12 SERPL-MCNC: 586 PG/ML (ref 211–911)
WBC # BLD AUTO: 8.4 K/UL (ref 4.8–10.8)

## 2022-11-03 PROCEDURE — 82728 ASSAY OF FERRITIN: CPT

## 2022-11-03 PROCEDURE — 94760 N-INVAS EAR/PLS OXIMETRY 1: CPT

## 2022-11-03 PROCEDURE — 97530 THERAPEUTIC ACTIVITIES: CPT

## 2022-11-03 PROCEDURE — 700111 HCHG RX REV CODE 636 W/ 250 OVERRIDE (IP): Performed by: PHYSICAL MEDICINE & REHABILITATION

## 2022-11-03 PROCEDURE — 84100 ASSAY OF PHOSPHORUS: CPT

## 2022-11-03 PROCEDURE — 92523 SPEECH SOUND LANG COMPREHEN: CPT

## 2022-11-03 PROCEDURE — 700101 HCHG RX REV CODE 250: Performed by: PHYSICAL MEDICINE & REHABILITATION

## 2022-11-03 PROCEDURE — 99223 1ST HOSP IP/OBS HIGH 75: CPT | Performed by: PHYSICAL MEDICINE & REHABILITATION

## 2022-11-03 PROCEDURE — 83550 IRON BINDING TEST: CPT

## 2022-11-03 PROCEDURE — 97535 SELF CARE MNGMENT TRAINING: CPT

## 2022-11-03 PROCEDURE — 82607 VITAMIN B-12: CPT

## 2022-11-03 PROCEDURE — 83540 ASSAY OF IRON: CPT

## 2022-11-03 PROCEDURE — 80053 COMPREHEN METABOLIC PANEL: CPT

## 2022-11-03 PROCEDURE — 97162 PT EVAL MOD COMPLEX 30 MIN: CPT

## 2022-11-03 PROCEDURE — 84134 ASSAY OF PREALBUMIN: CPT

## 2022-11-03 PROCEDURE — 83735 ASSAY OF MAGNESIUM: CPT

## 2022-11-03 PROCEDURE — 97166 OT EVAL MOD COMPLEX 45 MIN: CPT

## 2022-11-03 PROCEDURE — 85025 COMPLETE CBC W/AUTO DIFF WBC: CPT

## 2022-11-03 PROCEDURE — 770010 HCHG ROOM/CARE - REHAB SEMI PRIVAT*

## 2022-11-03 PROCEDURE — 36415 COLL VENOUS BLD VENIPUNCTURE: CPT

## 2022-11-03 PROCEDURE — 82306 VITAMIN D 25 HYDROXY: CPT

## 2022-11-03 PROCEDURE — 700102 HCHG RX REV CODE 250 W/ 637 OVERRIDE(OP): Performed by: PHYSICAL MEDICINE & REHABILITATION

## 2022-11-03 PROCEDURE — A9270 NON-COVERED ITEM OR SERVICE: HCPCS | Performed by: PHYSICAL MEDICINE & REHABILITATION

## 2022-11-03 RX ORDER — ACETAMINOPHEN 500 MG
500 TABLET ORAL 3 TIMES DAILY
Status: DISCONTINUED | OUTPATIENT
Start: 2022-11-03 | End: 2022-11-11 | Stop reason: HOSPADM

## 2022-11-03 RX ORDER — GABAPENTIN 300 MG/1
600 CAPSULE ORAL 3 TIMES DAILY
Status: DISCONTINUED | OUTPATIENT
Start: 2022-11-03 | End: 2022-11-07

## 2022-11-03 RX ORDER — ERGOCALCIFEROL 1.25 MG/1
50000 CAPSULE ORAL
Status: DISCONTINUED | OUTPATIENT
Start: 2022-11-03 | End: 2022-11-11 | Stop reason: HOSPADM

## 2022-11-03 RX ADMIN — ENOXAPARIN SODIUM 30 MG: 30 INJECTION SUBCUTANEOUS at 21:39

## 2022-11-03 RX ADMIN — GABAPENTIN 300 MG: 300 CAPSULE ORAL at 08:54

## 2022-11-03 RX ADMIN — LEVETIRACETAM 500 MG: 500 TABLET, FILM COATED ORAL at 20:15

## 2022-11-03 RX ADMIN — METAXALONE 800 MG: 800 TABLET ORAL at 08:55

## 2022-11-03 RX ADMIN — ACETAMINOPHEN 1000 MG: 500 TABLET ORAL at 08:53

## 2022-11-03 RX ADMIN — OXYCODONE HYDROCHLORIDE 10 MG: 10 TABLET ORAL at 11:05

## 2022-11-03 RX ADMIN — ERGOCALCIFEROL 50000 UNITS: 1.25 CAPSULE ORAL at 11:06

## 2022-11-03 RX ADMIN — METAXALONE 800 MG: 800 TABLET ORAL at 15:17

## 2022-11-03 RX ADMIN — METAXALONE 800 MG: 800 TABLET ORAL at 20:14

## 2022-11-03 RX ADMIN — LEVETIRACETAM 500 MG: 500 TABLET, FILM COATED ORAL at 08:55

## 2022-11-03 RX ADMIN — DOCUSATE SODIUM 100 MG: 100 CAPSULE, LIQUID FILLED ORAL at 08:54

## 2022-11-03 RX ADMIN — OMEPRAZOLE 20 MG: 20 CAPSULE, DELAYED RELEASE ORAL at 08:54

## 2022-11-03 RX ADMIN — DEXAMETHASONE 4 MG: 4 TABLET ORAL at 20:15

## 2022-11-03 RX ADMIN — GABAPENTIN 600 MG: 300 CAPSULE ORAL at 20:14

## 2022-11-03 RX ADMIN — ACETAMINOPHEN 500 MG: 500 TABLET ORAL at 15:17

## 2022-11-03 RX ADMIN — ACETAMINOPHEN 500 MG: 500 TABLET ORAL at 20:14

## 2022-11-03 RX ADMIN — OXYCODONE HYDROCHLORIDE 10 MG: 10 TABLET ORAL at 20:13

## 2022-11-03 RX ADMIN — ENOXAPARIN SODIUM 30 MG: 30 INJECTION SUBCUTANEOUS at 08:59

## 2022-11-03 RX ADMIN — DEXAMETHASONE 6 MG: 4 TABLET ORAL at 08:55

## 2022-11-03 RX ADMIN — GABAPENTIN 600 MG: 300 CAPSULE ORAL at 15:17

## 2022-11-03 RX ADMIN — OXYCODONE HYDROCHLORIDE 10 MG: 10 TABLET ORAL at 05:36

## 2022-11-03 RX ADMIN — DOCUSATE SODIUM 100 MG: 100 CAPSULE, LIQUID FILLED ORAL at 20:14

## 2022-11-03 RX ADMIN — Medication 1 TABLET: at 11:06

## 2022-11-03 RX ADMIN — POLYETHYLENE GLYCOL 3350 1 PACKET: 17 POWDER, FOR SOLUTION ORAL at 08:57

## 2022-11-03 RX ADMIN — LIDOCAINE 1 PATCH: 50 PATCH CUTANEOUS at 11:00

## 2022-11-03 ASSESSMENT — PATIENT HEALTH QUESTIONNAIRE - PHQ9
SUM OF ALL RESPONSES TO PHQ9 QUESTIONS 1 AND 2: 0
1. LITTLE INTEREST OR PLEASURE IN DOING THINGS: NOT AT ALL
2. FEELING DOWN, DEPRESSED, IRRITABLE, OR HOPELESS: NOT AT ALL
SUM OF ALL RESPONSES TO PHQ9 QUESTIONS 1 AND 2: 0
1. LITTLE INTEREST OR PLEASURE IN DOING THINGS: NOT AT ALL
2. FEELING DOWN, DEPRESSED, IRRITABLE, OR HOPELESS: NOT AT ALL

## 2022-11-03 ASSESSMENT — GAIT ASSESSMENTS
DEVIATION: ANTALGIC;DECREASED BASE OF SUPPORT;BRADYKINETIC;DECREASED HEEL STRIKE;DECREASED TOE OFF
GAIT LEVEL OF ASSIST: CONTACT GUARD ASSIST
DISTANCE (FEET): 50

## 2022-11-03 ASSESSMENT — BRIEF INTERVIEW FOR MENTAL STATUS (BIMS)
INITIAL REPETITION OF BED BLUE SOCK - FIRST ATTEMPT: 3
ASKED TO RECALL BED: YES, NO CUE REQUIRED
BIMS SUMMARY SCORE: 15
ASKED TO RECALL BLUE: YES, NO CUE REQUIRED
WHAT YEAR IS IT: CORRECT
WHAT MONTH IS IT: ACCURATE WITHIN 5 DAYS
INITIAL REPETITION OF BED BLUE SOCK - FIRST ATTEMPT: 3
ASKED TO RECALL BED: YES, NO CUE REQUIRED
WHAT DAY OF THE WEEK IS IT: CORRECT
ASKED TO RECALL SOCK: YES, NO CUE REQUIRED
WHAT DAY OF THE WEEK IS IT: CORRECT
BIMS SUMMARY SCORE: 15
ASKED TO RECALL BLUE: YES, NO CUE REQUIRED
ASKED TO RECALL SOCK: YES, NO CUE REQUIRED
WHAT MONTH IS IT: ACCURATE WITHIN 5 DAYS
WHAT YEAR IS IT: CORRECT

## 2022-11-03 ASSESSMENT — PAIN SCALES - WONG BAKER: WONGBAKER_NUMERICALRESPONSE: HURTS EVEN MORE

## 2022-11-03 ASSESSMENT — ACTIVITIES OF DAILY LIVING (ADL)
BED_CHAIR_WHEELCHAIR_TRANSFER_DESCRIPTION: INCREASED TIME;SUPERVISION FOR SAFETY;VERBAL CUEING
TOILETING: INDEPENDENT

## 2022-11-03 ASSESSMENT — PAIN DESCRIPTION - PAIN TYPE: TYPE: ACUTE PAIN

## 2022-11-03 NOTE — THERAPY
Occupational Therapy   Initial Evaluation     Patient Name: Nerissa Dennis  Age:  42 y.o., Sex:  female  Medical Record #: [id number]  Today's Date: 11/3/2022     Subjective    Patient in bed upon arrival, agreeable to participate in OT.      Objective     11/03/22 0831   OT Charge Group   OT Self Care / ADL 1   OT Evaluation OT Evaluation Mod   OT Total Time Spent   OT Individual Total Time Spent (Mins) 60   Prior Living Situation   Prior Services None;Home-Independent   Housing / Facility 1 Story House   Steps Into Home 2   Bathroom Set up Walk In Shower;Shower Glass Doors  (swinging glass door, fixed shower head)   Equipment Owned None   Lives with - Patient's Self Care Capacity Spouse  (and 2 dogs)   Prior Level of ADL Function   Self Feeding Independent   Grooming / Hygiene Independent   Bathing Independent   Dressing Independent   Toileting Independent   Comments Patient previously independent with all ADLs without AD/AE.   Prior Level of IADL Function   Medication Management Independent   Laundry Independent   Kitchen Mobility Independent   Finances Independent   Home Management Independent   Shopping Independent   Prior Level Of Mobility Independent Without Device in Community;Independent With Steps in Community;Independent Without Device in Home   Driving / Transportation Driving Independent   Occupation (Pre-Hospital Vocational) Employed Full Time  ()   Prior Functioning: Everyday Activities   Self Care Independent   Indoor Mobility (Ambulation) Independent   Stairs Independent   Functional Cognition Independent   Prior Device Use None of the given options  (no AD)   Vitals   O2 Delivery Device None - Room Air   Cognition    Level of Consciousness Alert   Cognitive Pattern Assessment   Cognitive Pattern Assessment Used BIMS   Brief Interview for Mental Status (BIMS)   Repetition of Three Words (First Attempt) 3   Temporal Orientation: Year Correct   Temporal Orientation: Month Accurate within 5 days  "  Temporal Orientation: Day Correct   Recall: \"Sock\" Yes, no cue required   Recall: \"Blue\" Yes, no cue required   Recall: \"Bed\" Yes, no cue required   BIMS Summary Score 15   Confusion Assessment Method (CAM)   Is there evidence of an acute change in mental status from the patient's baseline? No   Inattention Behavior not present   Disorganized thinking Behavior not present   Altered level of consciousness Behavior not present   Vision Screen   Vision   (no visual changes reported, able to read clock/badge without any difficulty)   Passive ROM Upper Body   Passive ROM Upper Body X   Comments R shoulder flexion PROM limited to ~150 degrees, all other joints WFL   Active ROM Upper Body   Active ROM Upper Body  X   Dominant Hand Right   Comments Trace AROM in R shoulder, decreased AROM in R elbow against gravity (however WFL w/ gravity eliminated), R wrist/fingers WFL   Strength Upper Body   Upper Body Strength  X   Sensation Upper Body   Upper Extremity Sensation  X   Comments Numbness, tingling throughout RUE (continuos), on/off tingling in LUE   Upper Body Muscle Tone   Upper Body Muscle Tone  WDL   Balance Assessment   Sitting Balance (Static) Normal   Sitting Balance (Dynamic) Good   Standing Balance (Static) Fair   Standing Balance (Dynamic) Fair -   Bed Mobility    Supine to Sit Supervised   Sit to Stand Contact Guard Assist   Scooting Supervised   Coordination Upper Body   Coordination WDL   Comments Grossly intact based on functional observation, RUE limited by proximal weakness   Eating   Assistance Needed Set-up / clean-up   CARE Score - Eating 5   Eating Discharge Goal   Discharge Goal 6   Oral Hygiene   Assistance Needed Set-up / clean-up   CARE Score - Oral Hygiene 5   Oral Hygiene Discharge Goal   Discharge Goal 6   Shower/Bathe Self   Assistance Needed Physical assistance   Physical Assistance Level 25% or less   CARE Score - Shower/Bathe Self 3   Shower/Bathe Self Discharge Goal   Discharge Goal 6 "   Upper Body Dressing   Assistance Needed Physical assistance   Physical Assistance Level 26%-50%   CARE Score - Upper Body Dressing 3   Upper Body Dressing Discharge Goal   Discharge Goal 6   Lower Body Dressing   Assistance Needed Physical assistance   Physical Assistance Level 25% or less   CARE Score - Lower Body Dressing 3   Lower Body Dressing Discharge Goal   Discharge Goal 6   Putting On/Taking Off Footwear   Assistance Needed Physical assistance   Physical Assistance Level 26%-50%   CARE Score - Putting On/Taking Off Footwear 3   Putting On/Taking Off Footwear Discharge Goal   Discharge Goal 6   Toileting Hygiene   Assistance Needed Incidental touching   CARE Score - Toileting Hygiene 4   Toileting Hygiene Discharge Goal   Discharge Goal 6   Toilet Transfer   Assistance Needed Incidental touching   CARE Score - Toilet Transfer 4   Toilet Transfer Discharge Goal   Discharge Goal 6   Hearing, Speech, and Vision   Ability to Hear Adequate   Ability to See in Adequate Light Adequate   Expression of Ideas and Wants Without difficulty   Understanding Verbal and Non-Verbal Content Understands   Functional Level of Assist   Eating Supervision  (Set-up, primarily uses LUE due to RUE weakness)   Grooming Supervision  (Set-up, primarily uses LUE due to RUE weakness)   Bathing Minimal Assist  (for distal LEs, limited by L post hip precautions)   Upper Body Dressing Moderate Assist  (Set-up to don long sleeve, Total A for C-collar)   Lower Body Dressing Minimal Assist  (for LLE, limited by L posterior hip precautions)   Toileting Contact Guard Assist   Bed, Chair, Wheelchair Transfer Contact Guard Assist   Toilet Transfers Contact Guard Assist  (wc <> toilet (stand pivot w/ grab bar))   Tub / Shower Transfers Contact Guard Assist  (functional mobility without AD toilet > shower)   Problem List   Problem List Decreased Active Daily Living Skills;Decreased Homemaking Skills;Decreased Upper Extremity Strength  Right;Decreased Upper Extremity AROM Right;Decreased Upper Extremity PROM Right;Decreased Functional Mobility;Decreased Activity Tolerance;Impaired Posture / Trunk Alignment;Impaired Coordination Right Upper Extremity;Impaired Sensation Right Upper Extremity;Impaired Sensation Left Upper Extremity;Impaired Postural Control / Balance;Limited Knowledge of Post Op Precautions   Precautions   Precautions Fall Risk;Posterior Hip Precautions;Weight Bearing As Tolerated Left Lower Extremity;Cervical Collar  ;Lumbosacral Orthosis;Spinal / Back Precautions    Comments Non-surgical   Current Discharge Plan   Current Discharge Plan Return to Prior Living Situation  (home w/ spouse in [place])   Benefit    Therapy Benefit Patient Would Benefit from Inpatient Rehab Occupational Therapy to Maximize [place] with ADLs, IADLs and Functional Mobility.   Interdisciplinary Plan of Care Collaboration   IDT Collaboration with  Physical Therapist;Nursing   Patient Position at End of Therapy Seated   Collaboration Comments PLOF/CLOF   Equipment Needs   Assistive Device / DME Wheelchair;Tub Transfer Bench;Grab Bars In Shower / Tub;Grab Bars By Toilet   Adaptive Equipment Long Handled Sponge   Strengths & Barriers   Strengths Able to follow instructions;Alert and oriented;Effective communication skills;Good insight into deficits/needs;Independent prior level of function;Motivated for self care and independence;Pleasant and cooperative;Supportive family;Willingly participates in therapeutic activities   Barriers Decreased endurance;Generalized weakness;Home accessibility;Impaired activity tolerance;Impaired balance;Limited mobility;Pain       Assessment  Patient is 42 y.o. female with a diagnosis of polytrauma following on-the-job accident (boulder fell on patient). The following were found as a result of injury: C6 spinous process fracture, Right T12, L1, and L2 transverse process fractures, Left L4 superior facet fracture, Fracture  through the superolateral L4 superior endplate, multiple rib fractures, subdural hematoma, bilateral nasal bone fractures, left hip dislocation, possible R brachial plexus injury. Patient has C-collar and LSO, spinal precautions, posterior hip precautions and WBAT precautions on LLE.     Prior to hospitalization patient was independent with all ADLs, IADLs and functional mobility without AD/AE. Works full time as a . At time of OT eval patient presents below functional baseline w/ primary barriers being pain, decreased balance, decreased mobility and spinal and posterior hip precautions. She is highly motivated to participate in therapy and return to [facility]. She will benefit from daily skilled OT to address above deficits and maximize independence w/ ADLs, IADLs and functional mobility.     Plan  Recommend Occupational Therapy  minutes per day 5-7 days per week for 10-14 days for the following treatments:  OT Orthotics Training, OT Self Care/ADL, OT Community Reintegration, OT Manual Ther Technique, OT Neuro Re-Ed/Balance, OT Sensory Int Techniques, OT Therapeutic Activity, OT Evaluation, and OT Therapeutic Exercise.    Passport items to be completed:  Perform bathroom transfers, complete dressing, complete feeding, get ready for the day, prepare a simple meal, participate in household tasks, adapt home for safety needs, demonstrate home exercise program, complete caregiver training     Goals:  Long term and short term goals have been discussed with patient and they are in agreement.    Occupational Therapy Goals (Active)       Problem: Dressing       Dates: Start: 11/03/22         Goal: STG-Within one week, patient will dress UB w/ set-up for shirt and min-mod A for c-collar        Dates: Start: 11/03/22            Goal: STG-Within one week, patient will dress LB w/ set-up to  SBA       Dates: Start: 11/03/22               Problem: Functional Transfers       Dates: Start: 11/03/22         Goal: STG-Within  one week, patient will transfer to toilet w/ consistent SBA       Dates: Start: 11/03/22            Goal: STG-Within one week, patient will transfer to step in shower w/ consistent SBA       Dates: Start: 11/03/22               Problem: OT Long Term Goals       Dates: Start: 11/03/22         Goal: LTG-By discharge, patient will complete basic self care tasks w/ mod I       Dates: Start: 11/03/22            Goal: LTG-By discharge, patient will perform bathroom transfers w/ mod I       Dates: Start: 11/03/22            Goal: LTG-By discharge, patient will complete basic home management w/ mod I       Dates: Start: 11/03/22

## 2022-11-03 NOTE — PROGRESS NOTES
4 Eyes Skin Assessment Completed by Michael RN and AZRA Lynne.    Head Scab, Bruising, and Scratch  Ears WDL  Nose WDL  Mouth WDL  Neck WDL  Breast/Chest WDL  Shoulder Blades WDL  Spine WDL  (R) Arm/Elbow/Hand WDL  (L) Arm/Elbow/Hand WDL  Abdomen WDL  Groin WDL  Scrotum/Coccyx/Buttocks WDL  (R) Leg Bruising  (L) Leg WDL  (R) Heel/Foot/Toe Bruising  (L) Heel/Foot/Toe WDL          Devices In Places Cervical Collar      Interventions In Place N/A    Possible Skin Injury No    Pictures Uploaded Into Epic Yes  Wound Consult Placed N/A  RN Wound Prevention Protocol Ordered No

## 2022-11-03 NOTE — PROGRESS NOTES
Pt arrived at [facility] from [facility], [place] via pt transport. MD Ramirez to follow for diagnosis of Trauma. Initial assessment initiated. Pt oriented to room and facility routine and safety measures; pt education binder provided and discussed. Pt A/O x 4, continent of bowel and bladder. sba assist for transfers. All wounds photographed and documented; photos uploaded to . Pt reports pain and discomfort at this time. Pt positioned for comfort in bed. Call light within reach, safety measures in place. Will continue to monitor.

## 2022-11-03 NOTE — CARE PLAN
The patient is Stable - Low risk of patient condition declining or worsening    Shift Goals  Patient Goals: Pain Management, SLeep well    Progress made toward(s) clinical / shift goals:    Problem: Fall Risk - Rehab  Goal: Patient will remain free from falls  Note: Julia Aguero Fall risk Assessment Score: 6  No risk.  Pt uses call light consistently and appropriately.   Waits for assistance does not attempt self transfer this shift. Able to verbalize needs.      Problem: Skin Integrity  Goal: Patient's skin integrity will be maintained or improve  Note: Patient's skin remains intact and free from new or accidental injury this shift.  Will continue to monitor.      Problem: Infection  Goal: Patient will remain free from infection  Note: Pt is able to verbalize s/s of infection: redness of area, fever, pain.       Alert and oriented X 4, on room air, respiration even/non labored. Skin is intact. Pain management with Oxycodone 10 mg for generalized body pain with relief.   OOB-BR with walker with standby-assist.   Sleeping off and on during the night.

## 2022-11-03 NOTE — FLOWSHEET NOTE
11/02/22 1713   Events/Summary/Plan   Events/Summary/Plan o2 spot check, rt consult Is   Vital Signs   Pulse 64   Respiration 16   Pulse Oximetry 98 %   $ Pulse Oximetry (Spot Check) Yes   Respiratory Assessment   Level of Consciousness Alert   Chest Exam   Work Of Breathing / Effort Within Normal Limits   Breath Sounds   RUL Breath Sounds Clear   RML Breath Sounds Clear   RLL Breath Sounds Diminished   ELAINE Breath Sounds Clear   LLL Breath Sounds Diminished   Oxygen   O2 Delivery Device None - Room Air

## 2022-11-03 NOTE — FLOWSHEET NOTE
11/02/22 1700   Hyperinflation Protocol   Hyperinflation Protocol Indications Chest Trauma (Blunt, Penetrative, Crushing, or Surgical)   I.S. Greater than 40% of Predicted IS to RN, after 72 hours of therapy   Hyperinflation Protocol Goals/Outcome Greater Than 60% of Predicted I.S. Volume x 24 hrs

## 2022-11-03 NOTE — CARE PLAN
Problem: Knowledge Deficit - Standard  Goal: Patient and family/care givers will demonstrate understanding of plan of care, disease process/condition, diagnostic tests and medications  Outcome: Progressing   Pt education given regarding plan of care with emphasis on upcoming rehab stay, pt shows good understanding, will continue to reinforce education and continue to monitor.       Problem: Fall Risk - Rehab  Goal: Patient will remain free from falls  Outcome: Progressing   Pt education given regarding fall precautions AND safety measures, pt shows good understanding, has not attempted to self transfer this shift, will continue to reinforce education and continue to monitor.

## 2022-11-03 NOTE — THERAPY
"Speech Language Pathology   Initial Assessment     Patient Name: Nerissa Dennis  AGE:  42 y.o., SEX:  female  Medical Record #: [id number]  Today's Date: 11/3/2022     Subjective    Pt pleasant and cooperative, willing to participate in cognitive evaluation. Reported no newly noted cognitive deficits following injury.      Objective       11/03/22 1303   Evaluation Charges   Charges Yes   SLP Speech Language Evaluation Speech Sound Language Comprehension   SLP Total Time Spent   SLP Individual Total Time Spent (Mins) 60   Precautions   Precautions Fall Risk   Prior Living Situation   Prior Services None;Home-Independent   Housing / Facility 1 Story House   Lives with - Patient's Self Care Capacity Spouse   Prior Level Of Function   Communication Within Functional Limits   Hearing Within Functional Limits for Evaluation   Vision Within Functional Limits for Evaluation   Education Some College or Trade School   Occupation (Pre-Hospital Vocational) Employed Full Time   Cognitive Pattern Assessment   Cognitive Pattern Assessment Used BIMS   Brief Interview for Mental Status (BIMS)   Repetition of Three Words (First Attempt) 3   Temporal Orientation: Year Correct   Temporal Orientation: Month Accurate within 5 days   Temporal Orientation: Day Correct   Recall: \"Sock\" Yes, no cue required   Recall: \"Blue\" Yes, no cue required   Recall: \"Bed\" Yes, no cue required   BIMS Summary Score 15   Confusion Assessment Method (CAM)   Is there evidence of an acute change in mental status from the patient's baseline? No   Inattention Behavior not present   Disorganized thinking Behavior not present   Altered level of consciousness Behavior not present   Functional Level of Assist   Comprehension Independent   Expression Independent   Social Interaction Independent   Problem Solving Modified Independent   Problem Solving Description Increased time   Memory Modified Independent   Memory Description Increased time   Outcome Measures "   Outcome Measures Utilized SCCAN   SCCAN (Scales of Cognitive and Communicative Ability for Neurorehabilitation)   Oral Expression - Raw Score 19   Oral Expression - Scale Performance Score 100   Orientation - Raw Score 12   Orientation - Scale Performance Score 100   Memory - Raw Score 17   Memory - Scale Performance Score 89   Speech Comprehension - Raw Score 13   Speech Comprehension - Scale Performance Score 100   Reading Comprehension - Raw Score 12   Reading Comprehension - Scale Performance Score 100   Writing - Raw Score 7   Writing - Scale Performance Score 100   Attention - Raw Score 15   Attention - Scale Performance Score 94   Problem Solving - Raw Score 22   Problem Solving - Scale Performance Score 96   SCCAN Total Raw Score 91   SCCAN Degree of Severity Typical Functioning   Current Discharge Plan   Current Discharge Plan Return to Prior Living Situation   Benefit   Therapy Benefit Patient would not benefit from Inpatient Rehab Speech-Language Pathology.  No further Speech Therapy recommended at this time.   Interdisciplinary Plan of Care Collaboration   IDT Collaboration with  Physician;Physical Therapist   Patient Position at End of Therapy Seated;In Bed;Family / Friend in Room;Tray Table within Reach;Call Light within Reach;Phone within Reach   Collaboration Comments CLOF and POC no further intervention by SLP   Speech Language Pathologist Assigned   Assigned SLP / Extension NO ST       Assessment    Patient is 42 y.o. female with a diagnosis of multi trauma.  Additional factors influencing patient status/progress (ie: cognitive factors, co-morbidities, social support, etc): pt indep and able to care for self prior, working full time. Pt reports no newly noted cognitive deficits following injury. Cognitive assessment completed with use of SCCAN, pt achieved a raw score of 91 which indicates cognitive function WFL. No further intervention is recommended nor warranted at this time, pt aware of POC  and in agreement as well as MD.     Plan  Recommend Speech Therapy 30-60 minutes per day  1  days per week for 1 days for the following treatments:  cognitive evaluation only         Speech Therapy Problems (Active)       There are no active problems.

## 2022-11-03 NOTE — PROGRESS NOTES
Shift received, patient alert and oriented in no acute distress, stated a pain level of 6 at this time. Will continue to monitor.

## 2022-11-03 NOTE — THERAPY
Physical Therapy   Initial Evaluation     Patient Name: Nerissa Dennis  Age:  42 y.o., Sex:  female  Medical Record #: [id number]  Today's Date: 11/3/2022     Subjective    Pt was seated in w/c upon arrival and agreeable to treatment.      Objective       11/03/22 1001   PT Charge Group   PT Therapeutic Activities 1   PT Evaluation PT Evaluation Mod   PT Total Time Spent   PT Individual Total Time Spent (Mins) 60   Prior Living Situation   Prior Services None;Home-Independent   Housing / Facility 1 Story House   Steps Into Home 2   Steps In Home 0   Rail None   Equipment Owned None   Lives with - Patient's Self Care Capacity Spouse   Comments Lives in [place], [place]   Prior Level of Functional Mobility   Bed Mobility Independent   Transfer Status Independent   Ambulation Independent   Distance Ambulation (Feet)   (Community)   Assistive Devices Used None   Stairs Independent   Prior Functioning: Everyday Activities   Self Care Independent   Indoor Mobility (Ambulation) Independent   Stairs Independent   Functional Cognition Independent   Prior Device Use None of the given options   Pain 0 - 10 Group   Therapist Pain Assessment Post Activity Pain Same as Prior to Activity;Nurse Notified;6   Cognition    Level of Consciousness Alert   Passive ROM Lower Body   Passive ROM Lower Body WDL   Active ROM Lower Body    Active ROM Lower Body  WDL   Strength Lower Body   Rt Hip Flexion Strength 4 (G)   Rt Knee Flexion Strength 5 (N)   Rt Knee Extension Strength 5 (N)   Rt Ankle Dorsiflexion Strength 5 (N)   Lt Hip Flexion Strength 4 (G)  (limited to 90 degrees to maintain hip precautions)   Lt Knee Flexion Strength 5 (N)   Lt Knee Extension Strength 5 (N)   Sensation Lower Body   Lower Extremity Sensation   WDL   Comments Intact light touch sensation   Bed Mobility    Supine to Sit Supervised   Sit to Supine Moderate Assist   Sit to Stand Contact Guard Assist   Scooting Supervised   Rolling Contact Guard Assist  (to L, unable to R d/t  pain)   Roll Left and Right   Assistance Needed Supervision   CARE Score - Roll Left and Right 4   Roll Left and Right Discharge Goal   Discharge Goal 6   Sit to Lying   Assistance Needed Physical assistance   Physical Assistance Level 51%-75%   CARE Score - Sit to Lying 2   Sit to Lying Discharge Goal   Discharge Goal 6   Lying to Sitting on Side of Bed   Assistance Needed Supervision   CARE Score - Lying to Sitting on Side of Bed 4   Lying to Sitting on Side of Bed Discharge Goal   Discharge Goal 6   Sit to Stand   Assistance Needed Incidental touching;Supervision   CARE Score - Sit to Stand 4   Sit to Stand Discharge Goal   Discharge Goal 6   Chair/Bed-to-Chair Transfer   Assistance Needed Incidental touching;Supervision   CARE Score - Chair/Bed-to-Chair Transfer 4   Chair/Bed-to-Chair Transfer Discharge Goal   Discharge Goal 6   Car Transfer   Reason if not Attempted Safety concerns   CARE Score - Car Transfer 88   Car Transfer Discharge Goal   Discharge Goal 6   Walk 10 Feet   Assistance Needed Incidental touching;Supervision   CARE Score - Walk 10 Feet 4   Walk 10 Feet Discharge Goal   Discharge Goal 6   Walk 50 Feet with Two Turns   Assistance Needed Incidental touching;Supervision   CARE Score - Walk 50 Feet with Two Turns 4   Walk 50 Feet with Two Turns Discharge Goal   Discharge Goal 6   Walk 150 Feet   Reason if not Attempted Safety concerns   CARE Score - Walk 150 Feet 88   Walk 150 Feet Discharge Goal   Discharge Goal 6   Walking 10 Feet on Uneven Surfaces   Reason if not Attempted Safety concerns   CARE Score - Walking 10 Feet on Uneven Surfaces 88   Walking 10 Feet on Uneven Surfaces Discharge Goal   Discharge Goal 6   1 Step (Curb)   Reason if not Attempted Safety concerns   CARE Score - 1 Step (Curb) 88   1 Step (Curb) Discharge Goal   Discharge Goal 6   4 Steps   Reason if not Attempted Safety concerns   CARE Score - 4 Steps 88   4 Steps Discharge Goal   Discharge Goal 6   12 Steps   Reason if not  Attempted Safety concerns   CARE Score - 12 Steps 88   12 Steps Discharge Goal   Discharge Goal 6   Picking Up Object   Reason if not Attempted Safety concerns   CARE Score - Picking Up Object 88   Picking Up Object Discharge Goal   Discharge Goal 6   Wheel 50 Feet with Two Turns   Reason if not Attempted Activity not applicable   CARE Score - Wheel 50 Feet with Two Turns 9   Wheel 50 Feet with Two Turns Discharge Goal   Discharge Goal 9   Wheel 150 Feet   Reason if not Attempted Activity not applicable   CARE Score - Wheel 150 Feet 9   Wheel 150 Feet Discharge Goal   Discharge Goal 9   Gait Functional Level of Assist    Gait Level Of Assist Contact Guard Assist   Assistive Device None   Distance (Feet) 50   # of Times Distance was Traveled 1   Deviation Antalgic;Decreased Base Of Support;Bradykinetic;Decreased Heel Strike;Decreased Toe Off   Wheelchair Functional Level of Assist   Wheelchair Assist   (N/A as pt is ambulatory)   Stairs Functional Level of Assist   Level of Assist with Stairs Unable to Participate   Transfer Functional Level of Assist   Bed, Chair, Wheelchair Transfer Contact Guard Assist   Bed Chair Wheelchair Transfer Description Increased time;Supervision for safety;Verbal cueing   Problem List    Problems Pain;Impaired Bed Mobility;Impaired Transfers;Impaired Ambulation;Functional Strength Deficit;Impaired Balance;Decreased Activity Tolerance   Precautions   Precautions Fall Risk;Weight Bearing As Tolerated Left Lower Extremity;Posterior Hip Precautions;Cervical Collar  ;Lumbosacral Orthosis;Spinal / Back Precautions    Current Discharge Plan   Current Discharge Plan Return to Prior Living Situation   Interdisciplinary Plan of Care Collaboration   IDT Collaboration with  Nursing;Occupational Therapist;Physician   Patient Position at End of Therapy In Bed;Call Light within Reach;Tray Table within Reach;Phone within Reach   Collaboration Comments CLOF, POC   Benefit   Therapy Benefit Patient Would  Benefit from Inpatient Rehabilitation Physical Therapy to Maximize Functional [place] with ADLs, IADLs and Mobility.   Strengths & Barriers   Strengths Able to follow instructions;Effective communication skills;Good carryover of learning;Good insight into deficits/needs;Independent prior level of function;Motivated for self care and independence;Pleasant and cooperative;Supportive family;Willingly participates in therapeutic activities   Barriers Decreased endurance;Fatigue;Home accessibility;Impaired activity tolerance;Limited mobility;Pain;Pain poorly managed     Pt educated on PT role, PT POC, rehab orientation.  Additional education provided on brachial plexus and pelvis, SIJ, and hip anatomy and physiology.    Assessment  Patient is 42 y.o. female with a diagnosis of multiple trauma s/p rock fall event resulting in C6 spinous process fracture, 3.3 mm left posterior subdural hematoma, multiple TP fractures, L4 compression fracture, multiple rib fractures, left fibular fracture, and left posterior hip dislocation.  Additional factors influencing patient status / progress (ie: cognitive factors, co-morbidities, social support, etc): independent PLOF, good social support, no PMH.      Plan  Recommend Physical Therapy  minutes per day 5-7 days per week for 7-10 days for the following treatments:  PT Group Therapy, PT E Stim Attended, PT Gait Training, PT Therapeutic Exercises, PT Neuro Re-Ed/Balance, PT Aquatic Therapy, PT Therapeutic Activity, PT Manual Therapy, and PT Evaluation.    Passport items to be completed:  Get in/out of bed safely, in/out of a vehicle, safely use mobility device, walk or wheel around home/community, navigate up and down stairs, show how to get up/down from the ground, ensure home is accessible, demonstrate HEP, complete caregiver training    Goals:  Long term and short term goals have been discussed with patient and they are in agreement.    Physical Therapy Problems (Active)        Problem: Mobility       Dates: Start: 11/03/22         Goal: STG-Within one week, patient will ambulate household distance x 100 feet without AD and SBA       Dates: Start: 11/03/22               Problem: Mobility Transfers       Dates: Start: 11/03/22         Goal: STG-Within one week, patient will perform bed mobility using log roll method with SBA       Dates: Start: 11/03/22            Goal: STG-Within one week, patient will transfer bed to chair without AD and SPV       Dates: Start: 11/03/22               Problem: PT-Long Term Goals       Dates: Start: 11/03/22         Goal: LTG-By discharge, patient will ambulate x 150 feet without AD and mod I for extra time       Dates: Start: 11/03/22            Goal: LTG-By discharge, patient will transfer one surface to another independent no AD       Dates: Start: 11/03/22            Goal: LTG-By discharge, patient will transfer in/out of a car without AD and SPV       Dates: Start: 11/03/22

## 2022-11-03 NOTE — FLOWSHEET NOTE
"   11/02/22 1712   Incentive Spirometry Treatment   Height 1.6 m (5' 2.99\")   Predicted Inspiratory Capacity 2250   60% of predicted IS capacity 1350 mL   40% of predicted IS capacity 900 mL   Incentive Spirometer Volume 2000 mL     "

## 2022-11-04 PROCEDURE — 700101 HCHG RX REV CODE 250: Performed by: PHYSICAL MEDICINE & REHABILITATION

## 2022-11-04 PROCEDURE — 700102 HCHG RX REV CODE 250 W/ 637 OVERRIDE(OP): Performed by: PHYSICAL MEDICINE & REHABILITATION

## 2022-11-04 PROCEDURE — 97530 THERAPEUTIC ACTIVITIES: CPT

## 2022-11-04 PROCEDURE — 94760 N-INVAS EAR/PLS OXIMETRY 1: CPT

## 2022-11-04 PROCEDURE — 97110 THERAPEUTIC EXERCISES: CPT

## 2022-11-04 PROCEDURE — 770010 HCHG ROOM/CARE - REHAB SEMI PRIVAT*

## 2022-11-04 PROCEDURE — A9270 NON-COVERED ITEM OR SERVICE: HCPCS | Performed by: PHYSICAL MEDICINE & REHABILITATION

## 2022-11-04 PROCEDURE — 97535 SELF CARE MNGMENT TRAINING: CPT

## 2022-11-04 PROCEDURE — 97112 NEUROMUSCULAR REEDUCATION: CPT

## 2022-11-04 PROCEDURE — 99232 SBSQ HOSP IP/OBS MODERATE 35: CPT | Performed by: PHYSICAL MEDICINE & REHABILITATION

## 2022-11-04 PROCEDURE — 700111 HCHG RX REV CODE 636 W/ 250 OVERRIDE (IP): Performed by: PHYSICAL MEDICINE & REHABILITATION

## 2022-11-04 RX ORDER — LANOLIN ALCOHOL/MO/W.PET/CERES
6 CREAM (GRAM) TOPICAL
Status: DISCONTINUED | OUTPATIENT
Start: 2022-11-04 | End: 2022-11-11 | Stop reason: HOSPADM

## 2022-11-04 RX ORDER — DULOXETIN HYDROCHLORIDE 30 MG/1
30 CAPSULE, DELAYED RELEASE ORAL DAILY
Status: DISCONTINUED | OUTPATIENT
Start: 2022-11-04 | End: 2022-11-11 | Stop reason: HOSPADM

## 2022-11-04 RX ADMIN — OMEPRAZOLE 20 MG: 20 CAPSULE, DELAYED RELEASE ORAL at 08:06

## 2022-11-04 RX ADMIN — ENOXAPARIN SODIUM 30 MG: 30 INJECTION SUBCUTANEOUS at 08:11

## 2022-11-04 RX ADMIN — Medication 1 TABLET: at 11:39

## 2022-11-04 RX ADMIN — METAXALONE 800 MG: 800 TABLET ORAL at 08:08

## 2022-11-04 RX ADMIN — DOCUSATE SODIUM 100 MG: 100 CAPSULE, LIQUID FILLED ORAL at 08:07

## 2022-11-04 RX ADMIN — Medication 6 MG: at 20:28

## 2022-11-04 RX ADMIN — METAXALONE 800 MG: 800 TABLET ORAL at 20:28

## 2022-11-04 RX ADMIN — POLYETHYLENE GLYCOL 3350 1 PACKET: 17 POWDER, FOR SOLUTION ORAL at 08:16

## 2022-11-04 RX ADMIN — LEVETIRACETAM 500 MG: 500 TABLET, FILM COATED ORAL at 20:29

## 2022-11-04 RX ADMIN — ENOXAPARIN SODIUM 30 MG: 30 INJECTION SUBCUTANEOUS at 20:26

## 2022-11-04 RX ADMIN — OXYCODONE 5 MG: 5 TABLET ORAL at 20:34

## 2022-11-04 RX ADMIN — ACETAMINOPHEN 500 MG: 500 TABLET ORAL at 14:17

## 2022-11-04 RX ADMIN — LIDOCAINE 2 PATCH: 50 PATCH CUTANEOUS at 08:13

## 2022-11-04 RX ADMIN — GABAPENTIN 600 MG: 300 CAPSULE ORAL at 08:08

## 2022-11-04 RX ADMIN — LEVETIRACETAM 500 MG: 500 TABLET, FILM COATED ORAL at 08:08

## 2022-11-04 RX ADMIN — GABAPENTIN 600 MG: 300 CAPSULE ORAL at 20:28

## 2022-11-04 RX ADMIN — DULOXETINE HYDROCHLORIDE 30 MG: 30 CAPSULE, DELAYED RELEASE ORAL at 15:36

## 2022-11-04 RX ADMIN — ACETAMINOPHEN 500 MG: 500 TABLET ORAL at 08:08

## 2022-11-04 RX ADMIN — GABAPENTIN 600 MG: 300 CAPSULE ORAL at 14:17

## 2022-11-04 RX ADMIN — METAXALONE 800 MG: 800 TABLET ORAL at 14:17

## 2022-11-04 RX ADMIN — ACETAMINOPHEN 500 MG: 500 TABLET ORAL at 20:27

## 2022-11-04 RX ADMIN — OXYCODONE HYDROCHLORIDE 10 MG: 10 TABLET ORAL at 07:36

## 2022-11-04 RX ADMIN — DEXAMETHASONE 2 MG: 1 TABLET ORAL at 20:28

## 2022-11-04 RX ADMIN — OXYCODONE 5 MG: 5 TABLET ORAL at 11:42

## 2022-11-04 RX ADMIN — DEXAMETHASONE 4 MG: 4 TABLET ORAL at 08:07

## 2022-11-04 SDOH — ECONOMIC STABILITY: TRANSPORTATION INSECURITY
IN THE PAST 12 MONTHS, HAS LACK OF RELIABLE TRANSPORTATION KEPT YOU FROM MEDICAL APPOINTMENTS, MEETINGS, WORK OR FROM GETTING THINGS NEEDED FOR DAILY LIVING?: NO

## 2022-11-04 SDOH — ECONOMIC STABILITY: TRANSPORTATION INSECURITY
IN THE PAST 12 MONTHS, HAS THE LACK OF TRANSPORTATION KEPT YOU FROM MEDICAL APPOINTMENTS OR FROM GETTING MEDICATIONS?: NO

## 2022-11-04 ASSESSMENT — GAIT ASSESSMENTS
GAIT LEVEL OF ASSIST: CONTACT GUARD ASSIST
DISTANCE (FEET): 50
DEVIATION: ANTALGIC;BRADYKINETIC;DECREASED HEEL STRIKE;DECREASED TOE OFF
DISTANCE (FEET): 150
DEVIATION: ANTALGIC;DECREASED BASE OF SUPPORT
GAIT LEVEL OF ASSIST: STANDBY ASSIST
ASSISTIVE DEVICE: NONE;FRONT WHEEL WALKER

## 2022-11-04 ASSESSMENT — PAIN SCALES - WONG BAKER: WONGBAKER_NUMERICALRESPONSE: HURTS A WHOLE LOT

## 2022-11-04 ASSESSMENT — PATIENT HEALTH QUESTIONNAIRE - PHQ9
SUM OF ALL RESPONSES TO PHQ9 QUESTIONS 1 AND 2: 0
1. LITTLE INTEREST OR PLEASURE IN DOING THINGS: NOT AT ALL
2. FEELING DOWN, DEPRESSED, IRRITABLE, OR HOPELESS: NOT AT ALL

## 2022-11-04 ASSESSMENT — ACTIVITIES OF DAILY LIVING (ADL): BED_CHAIR_WHEELCHAIR_TRANSFER_DESCRIPTION: INCREASED TIME;INITIAL PREPARATION FOR TASK;SUPERVISION FOR SAFETY

## 2022-11-04 ASSESSMENT — PAIN DESCRIPTION - PAIN TYPE: TYPE: ACUTE PAIN

## 2022-11-04 NOTE — CARE PLAN
Problem: Pain - Standard  Goal: Alleviation of pain or a reduction in pain to the patient’s comfort goal  Outcome: Progressing  Note: Assessed for pain and discomfort , C Collar in use , TLSO when out of bed, medicated with oxycodone 10 mg for gen pain with relief. [ See mar] Cont monitored.      Problem: Fall Risk - Rehab  Goal: Patient will remain free from falls  Outcome: Progressing  Note: Julia Aguero Fall risk Assessment Score: 7    Low fall risk interventions   - Call light within reach   - Yellow  socks   - Belongings within reach   - Bed in the lowest position          The patient is Watcher - Medium risk of patient condition declining or worsening    Shift Goals  Patient Goals: Pain Management, SLeep well    Progress made toward(s) clinical / shift goals:  Pt free from fall and injury. On pain mgt with relief.

## 2022-11-04 NOTE — DISCHARGE PLANNING
CASE MANAGEMENT INITIAL ASSESSMENT    Admit Date:  11/2/2022     Patient is a  42 y.o. female transferred from [facility] where she was hospitalized from 10/20 to 10/31.  The patient is a 42 y.o. 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 She was transferred to [facility] in [place], [place].    She had a subdural hematoma, bilateral nasal bone fractures, C6 spinous process fracture, bilateral rib fractures, L4 facet/endplate fractures, T12-L3 transverse process fractures, left posterior hip dislocation, and left distal fibula fracture.  Dislocation was reduced in the emergency department.  The patient was transported to the trauma intensive care unit.  She was evaluated by Dr. Ministerio Watts with orthopedic surgery.  Posterior hip precautions were continued and her left distal fibula fracture was managed nonoperatively.  She may be weightbearing as tolerated to the left lower extremity in an ankle splint.  She was evaluated by Dr. Josias Schreiber with neurosurgery and an MRI of her spine was completed.  Her head bleed was managed nonoperatively and a repeat head CT was stable.  She was placed on posttraumatic pharmacologic seizure prophylaxis.  Her C-spine fracture was managed nonoperatively with cervical immobilization.  Her thoracic and L-spine transverse process fractures were managed nonoperatively and she may have an LSO brace when out of bed.  She may don and doff the brace at the edge of bed..  She did have a nasal bone fracture and a facial surgery consult was not indicated.  For her blunt chest trauma she was provided aggressive pulmonary hygiene and a multimodal pain regimen.  She was made sexton status and transferred to the orthospine unit.  An LSO brace was ordered for comfort and the patient mobilized with physical and occupational therapies.  A cognitive evaluation was completed and demonstrates no further acute therapy needs.  Patient  was seen by physiatry and an excellent candidate for postacute services.      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/01/2022    Discharge planning issues 10/30/2022    Acute pain of right shoulder 10/29/2022    Trauma 10/28/2022    Closed fracture of multiple ribs of both sides 10/28/2022    Abnormal finding on lung imaging 10/28/2022    Fracture of cervical spinous process, initial encounter ([facility]) 10/28/2022    Traumatic subdural hemorrhage, initial encounter 10/28/2022    Nasal septum fracture, closed, initial encounter 10/28/2022    Lumbar compression fracture, closed, initial encounter ([facility]) 10/28/2022    Hip dislocation, left, initial encounter ([facility]) 10/28/2022    No contraindication to deep vein thrombosis (DVT) prophylaxis 10/28/2022    Closed fracture of distal end of left fibula 10/28/2022     Prior Living Situation:  Housing / Facility: [address] [facility] (in [place])  Lives with - Patient's Self Care Capacity: Spouse    Prior Level of Function:  Medication Management: Independent  Finances: Independent  Home Management: Independent  Shopping: Independent  Prior Level Of Mobility: Independent Without Device in Community, Independent With Steps in Community, Independent Without Device in Home  Driving / Transportation: Driving Independent    Support Systems:  Primary : Kamran Dennis Spouse  [phone]   Other support systems: Father/ adult child  Advance Directives: No  Power of  (Name & Phone): none    Previous Services Utilized:   Equipment Owned: None  Prior Services: None    Other Information:  Occupation (Pre-Hospital Vocational): Employed Full Time at [email or web address] in [place]   Pharmacy: [employer]  Primary Payor Source:  ([employer] - Workman's Comp)  Other MDs: Dr Josias Schreiber Neurosurgeon; Dr Watts Ortho - all non operative injuries    Patient / Family  Goal:  Improve mobility; return home     Plan:  1. Continue to follow patient through hospitalization and provide discharge planning in collaboration with patient, family, physicians and ancillary services.     2. Utilize community resources to ensure a safe discharge.

## 2022-11-04 NOTE — CARE PLAN
The patient is Stable - Low risk of patient condition declining or worsening    Shift Goals  Patient Goals: Pain Management, SLeep well    Progress made toward(s) clinical / shift goals:    Problem: Pain - Standard  Goal: Alleviation of pain or a reduction in pain to the patient’s comfort goal  Outcome: Progressing     Patient reports satisfactory pain control and decrease intensity after pharmacological pain management.

## 2022-11-04 NOTE — THERAPY
Occupational Therapy  Daily Treatment     Patient Name: Nerissa Dennis  Age:  42 y.o., Sex:  female  Medical Record #: [id number]  Today's Date: 11/4/2022     Precautions  Precautions: (P) Fall Risk, Posterior Hip Precautions, Weight Bearing As Tolerated Left Lower Extremity, Cervical Collar  , Lumbosacral Orthosis, Spinal / Back Precautions   Comments: (P) LSO, cervical collar, L ankle aircast    Subjective    Patient seated in w/c upon arrival, agreeable to participate in OT. Spouse present during session.      Objective     11/04/22 1331   OT Charge Group   OT Self Care / ADL 1   OT Neuromuscular Re-education / Balance 3   OT Total Time Spent   OT Individual Total Time Spent (Mins) 60   Precautions   Precautions Fall Risk;Posterior Hip Precautions;Weight Bearing As Tolerated Left Lower Extremity;Cervical Collar  ;Lumbosacral Orthosis;Spinal / Back Precautions    Comments LSO, cervical collar, L ankle aircast   Vitals   O2 Delivery Device None - Room Air   Functional Level of Assist   Bed, Chair, Wheelchair Transfer Modified Independent  (w/c level)   Toilet Transfers Modified Independent  (w/c level)   Neuro-Muscular Treatments   Neuro-Muscular Treatments Facilitation;Tactile Cuing;Vibration   Comments See note below for neuro re-ed/sensory re-education details   Interdisciplinary Plan of Care Collaboration   IDT Collaboration with  Family / Caregiver   Patient Position at End of Therapy Seated;Family / Friend in Room   Collaboration Comments spouse present during OT session     SROM in supine using interlaced hands technique; 2 sets x 10 shoulder flexion and shoulder circles (clockwise/counter clockwise). Bolster positioned under B knees for comfort/positioning.     Vibration applied to proximal RUE for sensory re-education with fair to good tolerance. Significant hypersensitivity only noted in mid-posterior deltoid region, w/ patient reporting burning sensation. Vibration also applied to anterior deltoid in  conjunction w/ SROM shoulder exercises to promote proximal motor return.     Mod I for wc <> mat txfr (stand pivot).    Assessment    Patient tolerated OT session well with focus on sensory re-education and mod I in room evaluation (w/c level). Patient cleared to complete bed and toilet txfrs @ mod I level with w/c. Tolerated vibration to proximal RUE well, w/ hypersensitivity noted only in mid/posterior deltoid region.   Strengths: Able to follow instructions, Alert and oriented, Effective communication skills, Good insight into deficits/needs, Independent prior level of function, Motivated for self care and independence, Pleasant and cooperative, Supportive family, Willingly participates in therapeutic activities  Barriers: Decreased endurance, Generalized weakness, Home accessibility, Impaired activity tolerance, Impaired balance, Limited mobility, Pain    Plan    Patient cleared for mod I in room @ w/c level.     RUE neuro re-ed/sensory re-education ([place], AAROM as tolerated, vibration, TENS, NMES, WB as tolerated)    Occupational Therapy Goals (Active)       Problem: Dressing       Dates: Start: 11/03/22         Goal: STG-Within one week, patient will dress UB w/ set-up for shirt and min-mod A for c-collar        Dates: Start: 11/03/22            Goal: STG-Within one week, patient will dress LB w/ set-up to  SBA       Dates: Start: 11/03/22               Problem: Functional Transfers       Dates: Start: 11/03/22         Goal: STG-Within one week, patient will transfer to toilet w/ consistent SBA       Dates: Start: 11/03/22            Goal: STG-Within one week, patient will transfer to step in shower w/ consistent SBA       Dates: Start: 11/03/22               Problem: OT Long Term Goals       Dates: Start: 11/03/22         Goal: LTG-By discharge, patient will complete basic self care tasks w/ mod I       Dates: Start: 11/03/22            Goal: LTG-By discharge, patient will perform bathroom transfers w/ mod I        Dates: Start: 11/03/22            Goal: LTG-By discharge, patient will complete basic home management w/ mod I       Dates: Start: 11/03/22

## 2022-11-04 NOTE — THERAPY
Physical Therapy   Daily Treatment     Patient Name: Nerissa Dennis  Age:  42 y.o., Sex:  female  Medical Record #: [id number]  Today's Date: 11/4/2022     Precautions  Precautions: Fall Risk, Posterior Hip Precautions, Cervical Collar  , Lumbosacral Orthosis, Spinal / Back Precautions , Weight Bearing As Tolerated Left Lower Extremity  Comments: LSO, cervical collar, L ankle aircast    Subjective    Pt was seated in w/c upon arrival and agreeable to treatment.  Pt's spouse present during session.      Objective       11/04/22 1501   PT Charge Group   PT Therapeutic Activities 4   PT Total Time Spent   PT Individual Total Time Spent (Mins) 60   Precautions   Precautions Fall Risk;Posterior Hip Precautions;Cervical Collar  ;Lumbosacral Orthosis;Spinal / Back Precautions ;Weight Bearing As Tolerated Left Lower Extremity   Gait Functional Level of Assist    Gait Level Of Assist Standby Assist   Assistive Device None;Front Wheel Walker   Distance (Feet) 50   # of Times Distance was Traveled 3   Deviation Antalgic;Bradykinetic;Decreased Heel Strike;Decreased Toe Off   Bed Mobility    Supine to Sit Modified Independent   Sit to Supine Modified Independent   Sit to Stand Supervised   Scooting Modified Independent   Rolling Modified Independent   Interdisciplinary Plan of Care Collaboration   IDT Collaboration with  Occupational Therapist   Patient Position at End of Therapy Seated;Edge of Bed;Call Light within Reach;Tray Table within Reach;Phone within Reach;Family / Friend in Room   Collaboration Comments POC     Collaboration completed with MD, vendor, and workers comp  on LSO and cervical collar fitting.      Completed education on fracture sites and injury locations using skeleton for increased visual carryover.  Trialed gait training without AD, PFWW, and FWW.  Walkers trialed for RUE support and to offload RLE for pain management, not necessarily for stability.  Completed family training with pt's spouse on ambulation.   Reviewed posterior hip and spinal precautions and functional movements and positions.     Assessment    Pt and pt's spouse verbalized all education.  Pt with good adherence to all precautions.  Pt with improving gait tolerance, but continues to be limited d/t pain. Pt's spouse demonstrated good safety and guarding with pt and is now cleared to ambulate with pt.     Strengths: Able to follow instructions, Effective communication skills, Good carryover of learning, Good insight into deficits/needs, Independent prior level of function, Motivated for self care and independence, Pleasant and cooperative, Supportive family, Willingly participates in therapeutic activities  Barriers: Decreased endurance, Fatigue, Home accessibility, Impaired activity tolerance, Limited mobility, Pain, Pain poorly managed    Plan    AAROM/ stretching as tolerated, progressive ambulation without device vs FWW, general strength and endurance training.     Passport items to be completed:  Get in/out of bed safely, in/out of a vehicle, safely use mobility device, walk or wheel around home/community, navigate up and down stairs, show how to get up/down from the ground, ensure home is accessible, demonstrate HEP, complete caregiver training    Physical Therapy Problems (Active)       Problem: Mobility       Dates: Start: 11/03/22         Goal: STG-Within one week, patient will ambulate household distance x 100 feet without AD and SBA       Dates: Start: 11/03/22               Problem: Mobility Transfers       Dates: Start: 11/03/22         Goal: STG-Within one week, patient will perform bed mobility using log roll method with SBA       Dates: Start: 11/03/22            Goal: STG-Within one week, patient will transfer bed to chair without AD and SPV       Dates: Start: 11/03/22               Problem: PT-Long Term Goals       Dates: Start: 11/03/22         Goal: LTG-By discharge, patient will ambulate x 150 feet without AD and mod I for extra time        Dates: Start: 11/03/22            Goal: LTG-By discharge, patient will transfer one surface to another independent no AD       Dates: Start: 11/03/22            Goal: LTG-By discharge, patient will transfer in/out of a car without AD and SPV       Dates: Start: 11/03/22

## 2022-11-04 NOTE — THERAPY
Physical Therapy   Daily Treatment     Patient Name: Nerissa Dennis  Age:  42 y.o., Sex:  female  Medical Record #: [id number]  Today's Date: 11/4/2022     Precautions  Precautions: (P) Fall Risk, Weight Bearing As Tolerated Left Lower Extremity  Comments: (P) LSO, cervical collar, L ankle aircast    Subjective    Pt up in wc, willing to participate.      Objective       11/04/22 1031   PT Charge Group   PT Therapeutic Exercise 2   PT Total Time Spent   PT Individual Total Time Spent (Mins) 30   Supine Lower Body Exercise   Supine Lower Body Exercises Yes   Bicep Curl 2 sets of 10   Serratus Punch 2 sets of 10   Tricep Press 2 sets of 10   Hip Abduction Hook Lying;2 sets of 10   Heel Slide 2 sets of 10   Ankle Pumps 2 sets of 10   Other Exercises UE AAROM 2 x 10 for shoulder flexion/ internal-external rotation   Bed Mobility    Sit to Supine Moderate Assist       Assessment    Pt completed AROM for RLE and LUE but requires AAROM for RUE/ LLE. Pt with limited PROM/ AAROM R shoulder flexion/ external rotation due to pain, and requires assist for all ROM exercises due to weakness.     Strengths: Able to follow instructions, Effective communication skills, Good carryover of learning, Good insight into deficits/needs, Independent prior level of function, Motivated for self care and independence, Pleasant and cooperative, Supportive family, Willingly participates in therapeutic activities  Barriers: Decreased endurance, Fatigue, Home accessibility, Impaired activity tolerance, Limited mobility, Pain, Pain poorly managed    Plan    AAROM/ stretching as tolerated, progressive ambulation without device, general strength and endurance training.    Passport items to be completed:  Get in/out of bed safely, in/out of a vehicle, safely use mobility device, walk or wheel around home/community, navigate up and down stairs, show how to get up/down from the ground, ensure home is accessible, demonstrate HEP, complete caregiver  training    Physical Therapy Problems (Active)       Problem: Mobility       Dates: Start: 11/03/22         Goal: STG-Within one week, patient will ambulate household distance x 100 feet without AD and SBA       Dates: Start: 11/03/22               Problem: Mobility Transfers       Dates: Start: 11/03/22         Goal: STG-Within one week, patient will perform bed mobility using log roll method with SBA       Dates: Start: 11/03/22            Goal: STG-Within one week, patient will transfer bed to chair without AD and SPV       Dates: Start: 11/03/22               Problem: PT-Long Term Goals       Dates: Start: 11/03/22         Goal: LTG-By discharge, patient will ambulate x 150 feet without AD and mod I for extra time       Dates: Start: 11/03/22            Goal: LTG-By discharge, patient will transfer one surface to another independent no AD       Dates: Start: 11/03/22            Goal: LTG-By discharge, patient will transfer in/out of a car without AD and SPV       Dates: Start: 11/03/22

## 2022-11-04 NOTE — THERAPY
Physical Therapy   Daily Treatment     Patient Name: Nerissa Dennis  Age:  42 y.o., Sex:  female  Medical Record #: [id number]  Today's Date: 11/4/2022     Precautions  Precautions: (P) Fall Risk, Weight Bearing As Tolerated Left Lower Extremity  Comments: (P) LSO, cervical collar, L ankle aircast    Subjective    It could have been so much worse. I am worried about my R arm and not being able to lift it.     Objective       11/04/22 0931   PT Total Time Spent   PT Individual Total Time Spent (Mins) 30   Precautions   Precautions Fall Risk;Weight Bearing As Tolerated Left Lower Extremity   Comments LSO, cervical collar, L ankle aircast   Pain   Intervention Medication (see MAR)   Pain 0 - 10 Group   Location Generalized   Pain Rating Scale (NPRS) 6   Description Aching   Comfort Goal Perform Activity;Comfort with Movement   Therapist Pain Assessment Post Activity Pain Same as Prior to Activity   Non Verbal Descriptors   Non Verbal Scale  Calm   Cognition    Level of Consciousness Alert   ABS (Agitated Behavior Scale)   Agitated Behavior Scale Performed Yes   Short Attention Span, Easy Distractibility, Inability to Concentrate 1   Impulsive, Impatient, Low Tolerance for Pain or Frustration 1   Uncooperative, Resistant to Care, Demanding 1   Violent and/or Threatening Violence Toward People or Property 1   Explosive and/or Unpredictable Anger 1   Rocking, Rubbing, Moaning, Other Self-Stimulating Behavior 1   Pulling at Tubes, Restraints, etc. 1   Wandering from Treatment Area 1   Restlessness, Pacing, Excessive Movement 1   Repetitive Behaviors, Motor and/or Verbal 1   Rapid, Loud or Excessive Talking 1   Sudden Changes of Mood 1   Easily Initiated - Excessive Crying and/or Laughter 1   Self-Abusiveness, Physical and/or Verbal 1   Agitated Behavior Scale Total Score 14   Level of Severity No Agitation   Sleep/Wake Cycle   Sleep & Rest Awake   Gait Functional Level of Assist    Gait Level Of Assist Contact Guard Assist    Assistive Device None  (LSO, cervcal collar)   Distance (Feet) 150  (+ 180ft)   # of Times Distance was Traveled 2   Deviation Antalgic;Decreased Base Of Support   Wheelchair Functional Level of Assist   Wheelchair Assist   (N/A pt is ambulatory)   Stairs Functional Level of Assist   Level of Assist with Stairs Contact Guard Assist   # of Stairs Climbed 4   Stairs Description Extra time;Hand rails;Verbal cueing;Supervision for safety  (VC for step sequencing, step-to pattern)   Transfer Functional Level of Assist   Bed, Chair, Wheelchair Transfer Contact Guard Assist   Bed Chair Wheelchair Transfer Description Increased time;Initial preparation for task;Supervision for safety   Neuro-Muscular Treatments   Comments unsupported sitting scapular squeezes, shoulder shrugs; education on precautions, healing time, need for recovery breathing   Interdisciplinary Plan of Care Collaboration   IDT Collaboration with  Family / Caregiver   Patient Position at End of Therapy Seated;Family / Friend in Room;Call Light within Reach;Edge of Bed   Collaboration Comments home setting, stair training       Assessment    Patient able to direct donning of braces prior to treatment. Awaiting approval for smaller LSO corset and new pads for cervical collar. Patient able to tolerate household gait distances and stair training with close CGA. L ankle causing most pain with gait training.  present and very supportive. Patient able to recall date and time, but does not have clear memory of accident. Verbalized understanding of healing process, need for incentive spirometer use, L hip strengthening.    Strengths: Able to follow instructions, Effective communication skills, Good carryover of learning, Good insight into deficits/needs, Independent prior level of function, Motivated for self care and independence, Pleasant and cooperative, Supportive family, Willingly participates in therapeutic activities  Barriers: Decreased endurance,  Fatigue, Home accessibility, Impaired activity tolerance, Limited mobility, Pain, Pain poorly managed    Plan    AAROM/ stretching as tolerated, progressive ambulation without device, general strength and endurance training.    Passport items to be completed:  Get in/out of bed safely, in/out of a vehicle, safely use mobility device, walk or wheel around home/community, navigate up and down stairs, show how to get up/down from the ground, ensure home is accessible, demonstrate HEP, complete caregiver training    Physical Therapy Problems (Active)       Problem: Mobility       Dates: Start: 11/03/22         Goal: STG-Within one week, patient will ambulate household distance x 100 feet without AD and SBA       Dates: Start: 11/03/22               Problem: Mobility Transfers       Dates: Start: 11/03/22         Goal: STG-Within one week, patient will perform bed mobility using log roll method with SBA       Dates: Start: 11/03/22            Goal: STG-Within one week, patient will transfer bed to chair without AD and SPV       Dates: Start: 11/03/22               Problem: PT-Long Term Goals       Dates: Start: 11/03/22         Goal: LTG-By discharge, patient will ambulate x 150 feet without AD and mod I for extra time       Dates: Start: 11/03/22            Goal: LTG-By discharge, patient will transfer one surface to another independent no AD       Dates: Start: 11/03/22            Goal: LTG-By discharge, patient will transfer in/out of a car without AD and SPV       Dates: Start: 11/03/22

## 2022-11-04 NOTE — PROGRESS NOTES
Shift received, patient alert and oriented in no acute distress, stated a pain level of 8 at this time. Will continue to monitor.

## 2022-11-04 NOTE — PROGRESS NOTES
"REHABILITATION PROGRESS NOTE    Date of Admission: 11/2/2022    [facility] Code / Diagnosis to Support: 0014.2 - Major Multiple Trauma: Brain + Multiple Fracture/Amputation  Etiologic Diagnosis: Trauma    SUBJECTIVE  Patient seen in room. No visitors present  GI: continent, last BM 11/2  : continent  Psych: not sleeping well at night. Awake off and on - schedule melatonin, patient reporting feeling \"emotional\" - teary eyed during our exam - patient willing to try cymbalta to assist in pain control and mood  MSK: pain stable,     I have performed a physical exam and reviewed and updated history and plan today (11/4/2022).       MEDICATIONS:  Current Facility-Administered Medications   Medication Dose    DULoxetine (CYMBALTA) capsule 30 mg  30 mg    melatonin tablet 6 mg  6 mg    acetaminophen (TYLENOL) tablet 500 mg  500 mg    vitamin D2 (Ergocalciferol) (Drisdol) capsule 50,000 Units  50,000 Units    gabapentin (NEURONTIN) capsule 600 mg  600 mg    Respiratory Therapy Consult      Pharmacy Consult Request ...Pain Management Review 1 Each  1 Each    omeprazole (PRILOSEC) capsule 20 mg  20 mg    mag hydrox-al hydrox-simeth (MAALOX PLUS ES or MYLANTA DS) suspension 20 mL  20 mL    ondansetron (ZOFRAN ODT) dispertab 4 mg  4 mg    Or    ondansetron (ZOFRAN) syringe/vial injection 4 mg  4 mg    sodium chloride (OCEAN) 0.65 % nasal spray 2 Spray  2 Spray    therapeutic multivitamin-minerals (THERAGRAN-M) tablet 1 Tablet  1 Tablet    acetaminophen (TYLENOL) tablet 500 mg  500 mg    magnesium hydroxide (MILK OF MAGNESIA) suspension 30 mL  30 mL    polyethylene glycol/lytes (MIRALAX) PACKET 1 Packet  1 Packet    dexamethasone (DECADRON) tablet 2 mg  2 mg    Followed by    [START ON 11/6/2022] dexamethasone (DECADRON) tablet 2 mg  2 mg    enoxaparin (Lovenox) inj 30 mg  30 mg    levETIRAcetam (KEPPRA) tablet 500 mg  500 mg    lidocaine (LIDODERM) 5 % 1-3 Patch  1-3 Patch    metaxalone (Skelaxin) tablet 800 mg  800 mg    oxyCODONE " "immediate-release (ROXICODONE) tablet 5 mg  5 mg    Or    oxyCODONE immediate release (ROXICODONE) tablet 10 mg  10 mg       PHYSICAL EXAM:     VITAL SIGNS:   height is 1.6 m (5' 2.99\") and weight is 70 kg (154 lb 5.2 oz). Her oral temperature is 36.7 °C (98 °F). Her blood pressure is 114/73 and her pulse is 61. Her respiration is 20 and oxygen saturation is 99%.     General: well-groomed in no acute distress, sitting in bed  Eyes: no scleral icterus or conjunctival injection  Ears, nose, mouth and throat: moist oral mucosa  Cardiovascular: good peripheral perfusion  Respiratory: breathing comfortably without use of accessory muscles  Gastrointestinal: nondistended  Genitourinary: no indwelling beck  Musculoskeletal: good symmetry in bilateral shoulders,  Skin: no wounds seen on exposed skin    Neurologic:  Mental status:  A&Ox4 (person, place, date, situation) answers questions appropriately follows commands  Speech: fluent, no aphasia or dysarthria  At least antigravity in major muscle groups of right upper limb  Good trunk support unassisted sitting in wheelchair  +trigger point in right levator scap  Tone: no spasticity noted  Psychiatric: appropriate affect  Hematologic/lymphatic/immunologic: no IV access    LABS:  Recent Labs     11/02/22  0443 11/03/22  0520   SODIUM 136 139   POTASSIUM 4.1 4.4   CHLORIDE 101 104   CO2 26 24   GLUCOSE 125* 119*   BUN 13 19   CREATININE 0.55 0.55   CALCIUM 8.9 8.7     Recent Labs     11/02/22  0443 11/03/22  0520   WBC 9.0 8.4   RBC 3.35* 3.49*   HEMOGLOBIN 11.0* 11.2*   HEMATOCRIT 32.9* 35.1*   MCV 98.2* 100.6*   MCH 32.8 32.1   MCHC 33.4* 31.9*   RDW 43.0 45.4   PLATELETCT 303 320   MPV 9.0 9.0             PRIMARY REHAB DIAGNOSIS:    This patient is a 42 y.o. female admitted for acute inpatient rehabilitation with   Trauma.    Medical management / Rehabilitation Issues/ Adverse Potential as part of rehabilitation plan     REHABILITATION ISSUES/ADVERSE POTENTIAL and MEDICAL " CO-MORBIDITIES/ADVERSE POTENTIAL AFFECTING FUNCTION:    ##MSK  #Impaired ADLs and mobility  Patient is admitted to [facility] for comprehensive rehabilitation therapy as described.   Rehabilitation nursing monitors bowel and bladder control, educates on medication administration, co-morbidities and monitors patient safety.  Estimated length of stay: 10-14 days  Anticipated discharge date: TBD  Anticipated discharge destination: home with family  Prognosis: good  Equipment: TBD  Postdischarge therapies: TBD  Followup: ortho (Ministerio Watts MD), Neuro ([facility]), PCP  Precautions: Weight bearing as tolerated in left lower limb. LSO when out of bed (don at edge of bed). C collar at all times. posterior hip precautions  Code: full resuscitation    #Posttraumatic pain, acute, controlled  #Neuropathic pain, acute, uncontrolled  Continue lidoderm patch 5% 1-3 patches daily, skelaxin 800mg TID, oxycodone 5-10mg Q3hr PRN pain, gabapentin 600mg TID  Ordered cymbalta 30mg daily    #Polytrauma  fracture of the distal fibula  C6 spinous process fracture  Right T12, L1, and L2 transverse process fractures  Left L4 superior facet fracture  Fracture through the superolateral L4 superior endplate  Posterior left 10th and 11th rib fractures. Right posterior third rib fracture is seen. Right posterior 12th rib fracture at the costovertebral junction  Bilateral nasal bone fractures  Left hip dislocation  Right shoulder with Edema involving the soft tissues surrounding the scapular body possibly representing nondisplaced scapular body fracture; Strain/contusion of the trapezius, deltoid, supraspinatus, infraspinatus, subscapularis and teres minor muscles.  MRI Cspine interspinous lig stretch injury no overt Fx aside form C6 sp, no sign of nerve root injury on scan     ##NEURO  #Acute moderate traumatic brain injury  3.3 mm left posterior subdural hematoma, bilateral nasal bone fracture  LOC less than five minutes per  documentation  Continue keppra 500mg BID for seizure prophylaxis until 11/5  SLP    #Possible right brachial plexus injury  Ordered dexamethasone taper  EMG followup if not improving   Monitor    #RESP  #Incidental pulmonary nodule  Patient works in mine  High risk - per radiology, optional CT in 12 months    Respiratory therapy: RT performs O2 management prn, breathing retraining, pulmonary hygiene and bronchospasm management prn to optimize participation in therapies.     ##CARDIAC  DVT prophylaxis: Continue lovenox 30mg BID. Encourage OOB. Monitor daily for signs and symptoms of DVT including but not limited to swelling and pain to prevent the development of DVT that may interfere with therapies.    ##GI  GI prophylaxis:  Continue prilosec 20mg daily to prevent gastritis/dyspepsia which may interfere with therapies.  #At risk of opioid induced constipation  Goal of one continent BM daily  Continue miralax 17g daily    #At high risk of malnutrition  Dietician monitors nutrient intake, recommend supplements prn and provide nutrition education to pt/family to promote optimal nutrition for wound healing/recovery.   Orders Placed This Encounter   Procedures    Diet Order Diet: Regular     Standing Status:   Standing     Number of Occurrences:   1     Order Specific Question:   Diet:     Answer:   Regular [1]   Continue multivitamin daily  prealbumin levels 28.5    #Elevated liver enzymes  AST 98,   Monitor    ##HEME/ENDO  #Anemia  Monitor    #Vitamin D deficiency  Vit D 25 OH 12  Continue ergocalciferol 50,000 units weekly    ##SKIN  Skin/dermal ulcer prophylaxis: Monitor for new skin conditions with q.2 h. turns as required to prevent the development of skin breakdown.     Kandi Ramirez, DO  Physical Medicine and Rehabilitation

## 2022-11-04 NOTE — FLOWSHEET NOTE
11/03/22 1858   Events/Summary/Plan   Events/Summary/Plan SpO2 check, IS   Vital Signs   Pulse 92   Respiration 18   Pulse Oximetry 96 %   $ Pulse Oximetry (Spot Check) Yes   Respiratory Assessment   Respiratory Pattern Within Normal Limits   Level of Consciousness Alert   Chest Exam   Work Of Breathing / Effort Within Normal Limits   Breath Sounds   RUL Breath Sounds Clear   RML Breath Sounds Clear   RLL Breath Sounds Clear   ELAINE Breath Sounds Clear   LLL Breath Sounds Clear   Secretions   Cough Non Productive   Oxygen   O2 Delivery Device None - Room Air

## 2022-11-04 NOTE — FLOWSHEET NOTE
11/03/22 1857   Incentive Spirometry Treatment   Incentive Spirometer Volume 2000 mL  (Excellent effort and technique)

## 2022-11-05 PROCEDURE — 700111 HCHG RX REV CODE 636 W/ 250 OVERRIDE (IP): Performed by: PHYSICAL MEDICINE & REHABILITATION

## 2022-11-05 PROCEDURE — 97110 THERAPEUTIC EXERCISES: CPT

## 2022-11-05 PROCEDURE — 97112 NEUROMUSCULAR REEDUCATION: CPT

## 2022-11-05 PROCEDURE — 94760 N-INVAS EAR/PLS OXIMETRY 1: CPT

## 2022-11-05 PROCEDURE — 700101 HCHG RX REV CODE 250: Performed by: PHYSICAL MEDICINE & REHABILITATION

## 2022-11-05 PROCEDURE — 97116 GAIT TRAINING THERAPY: CPT

## 2022-11-05 PROCEDURE — 770010 HCHG ROOM/CARE - REHAB SEMI PRIVAT*

## 2022-11-05 PROCEDURE — 700102 HCHG RX REV CODE 250 W/ 637 OVERRIDE(OP): Performed by: PHYSICAL MEDICINE & REHABILITATION

## 2022-11-05 PROCEDURE — A9270 NON-COVERED ITEM OR SERVICE: HCPCS | Performed by: PHYSICAL MEDICINE & REHABILITATION

## 2022-11-05 RX ADMIN — ACETAMINOPHEN 500 MG: 500 TABLET ORAL at 14:32

## 2022-11-05 RX ADMIN — LEVETIRACETAM 500 MG: 500 TABLET, FILM COATED ORAL at 07:28

## 2022-11-05 RX ADMIN — OXYCODONE HYDROCHLORIDE 10 MG: 10 TABLET ORAL at 20:24

## 2022-11-05 RX ADMIN — METAXALONE 800 MG: 800 TABLET ORAL at 07:29

## 2022-11-05 RX ADMIN — OXYCODONE HYDROCHLORIDE 10 MG: 10 TABLET ORAL at 04:26

## 2022-11-05 RX ADMIN — Medication 6 MG: at 20:24

## 2022-11-05 RX ADMIN — ACETAMINOPHEN 500 MG: 500 TABLET ORAL at 07:29

## 2022-11-05 RX ADMIN — ENOXAPARIN SODIUM 30 MG: 30 INJECTION SUBCUTANEOUS at 20:25

## 2022-11-05 RX ADMIN — GABAPENTIN 600 MG: 300 CAPSULE ORAL at 07:29

## 2022-11-05 RX ADMIN — ENOXAPARIN SODIUM 30 MG: 30 INJECTION SUBCUTANEOUS at 07:34

## 2022-11-05 RX ADMIN — GABAPENTIN 600 MG: 300 CAPSULE ORAL at 14:32

## 2022-11-05 RX ADMIN — DEXAMETHASONE 2 MG: 1 TABLET ORAL at 07:28

## 2022-11-05 RX ADMIN — METAXALONE 800 MG: 800 TABLET ORAL at 14:32

## 2022-11-05 RX ADMIN — OXYCODONE 5 MG: 5 TABLET ORAL at 07:34

## 2022-11-05 RX ADMIN — POLYETHYLENE GLYCOL 3350 1 PACKET: 17 POWDER, FOR SOLUTION ORAL at 07:29

## 2022-11-05 RX ADMIN — Medication 1 TABLET: at 11:41

## 2022-11-05 RX ADMIN — OMEPRAZOLE 20 MG: 20 CAPSULE, DELAYED RELEASE ORAL at 07:28

## 2022-11-05 RX ADMIN — ACETAMINOPHEN 500 MG: 500 TABLET ORAL at 20:24

## 2022-11-05 RX ADMIN — METAXALONE 800 MG: 800 TABLET ORAL at 20:24

## 2022-11-05 RX ADMIN — GABAPENTIN 600 MG: 300 CAPSULE ORAL at 20:24

## 2022-11-05 RX ADMIN — DULOXETINE HYDROCHLORIDE 30 MG: 30 CAPSULE, DELAYED RELEASE ORAL at 07:28

## 2022-11-05 ASSESSMENT — GAIT ASSESSMENTS
GAIT LEVEL OF ASSIST: STANDBY ASSIST
DISTANCE (FEET): 400

## 2022-11-05 ASSESSMENT — PAIN SCALES - WONG BAKER: WONGBAKER_NUMERICALRESPONSE: DOESN'T HURT AT ALL

## 2022-11-05 ASSESSMENT — PAIN DESCRIPTION - PAIN TYPE: TYPE: ACUTE PAIN

## 2022-11-05 NOTE — CARE PLAN
Problem: Knowledge Deficit - Standard  Goal: Patient and family/care givers will demonstrate understanding of plan of care, disease process/condition, diagnostic tests and medications  Outcome: Progressing     Problem: Fall Risk - Rehab  Goal: Patient will remain free from falls  Outcome: Progressing   The patient is Stable - Low risk of patient condition declining or worsening    Shift Goals  Patient Goals: sleep well, pain management    Progress made toward(s) clinical / shift goals:  Patient is resting in bed     Patient is not progressing towards the following goals:

## 2022-11-05 NOTE — FLOWSHEET NOTE
11/05/22 1300   Hyperinflation Protocol   Hyperinflation Protocol Indications Chest Trauma (Blunt, Penetrative, Crushing, or Surgical)   I.S. Greater than 40% of Predicted IS to RN, after 72 hours of therapy   Hyperinflation Protocol Goals/Outcome Greater Than 60% of Predicted I.S. Volume x 24 hrs

## 2022-11-05 NOTE — FLOWSHEET NOTE
11/04/22 1700   Events/Summary/Plan   Events/Summary/Plan SpO2 check, IS   Vital Signs   Pulse 64   Respiration 16   Pulse Oximetry 98 %   $ Pulse Oximetry (Spot Check) Yes   Oxygen   O2 Delivery Device None - Room Air

## 2022-11-05 NOTE — THERAPY
"Occupational Therapy  Daily Treatment     Patient Name: Nerissa Dennis  Age:  42 y.o., Sex:  female  Medical Record #: [id number]  Today's Date: 11/5/2022     Precautions  Precautions: Fall Risk, Posterior Hip Precautions, Cervical Collar  , Lumbosacral Orthosis, Spinal / Back Precautions , Weight Bearing As Tolerated Left Lower Extremity  Comments: LSO, cervical collar, L ankle aircast         Subjective    Patient received up in w/c, agreeable to OT      Objective       11/05/22 1301   OT Charge Group   OT Neuromuscular Re-education / Balance 2   OT Therapeutic Exercise  2   OT Total Time Spent   OT Individual Total Time Spent (Mins) 60   Supine Upper Body Exercises   Front Arm Raise Other Resistance (See Comments)  (BUE clasped pt compled x1 raise and held in position, reported \"pulling\" from back and around, decreased with min facilitation at scapula medially)   Elbow Extension 2 sets of 15;Right  (with stabilization proximally, elbow extension against gravity)   Other Exercise internal/external rotation with shoulder in 70 degrees of abduction, 1x15 reps   Sitting Upper Body Exercises   Sitting Upper Body Exercises Yes   External Shoulder Rotation 1 set of 10;Right   (min support at elbow)   Bicep Curls 1 set of 10   Other Exercise scapular retraction/protraction 1x10 reps with 5 second holds   Balance   Comments functional mobility room to gym with FWW CGA, functional mobility on unit no AD CGA proximal support at R elbow   Bed Mobility    Supine to Sit Independent   Sit to Supine Minimal Assist  (on therapy mat via logroll)       Assessment    Patient tolerated session well, focus on progression of mobility tolerance as well as RUE function. RUE with emerging motor return though not enough for functional use. Continues with pain in R shoulder, reports tension in R scalenes and R rhomboid on medial border of scapular. Pt appears to have mild limitations in scapular mobility, slight improvement with facilitation at " medial border, educated on completing gentle scap retraction/protraction in room as tolerated with pt able to return demo appropriately, educated on attention to support at R elbow when sitting up in w/c to decreased neck strain.     Strengths: Able to follow instructions, Alert and oriented, Effective communication skills, Good insight into deficits/needs, Independent prior level of function, Motivated for self care and independence, Pleasant and cooperative, Supportive family, Willingly participates in therapeutic activities  Barriers: Decreased endurance, Generalized weakness, Home accessibility, Impaired activity tolerance, Impaired balance, Limited mobility, Pain    Plan    RUE neuro re-ed/sensory re-education (Saebo, AAROM as tolerated, vibration, TENS, NMES, WB as tolerated)    Occupational Therapy Goals (Active)       Problem: Dressing       Dates: Start: 11/03/22         Goal: STG-Within one week, patient will dress UB w/ set-up for shirt and min-mod A for c-collar        Dates: Start: 11/03/22            Goal: STG-Within one week, patient will dress LB w/ set-up to  SBA       Dates: Start: 11/03/22               Problem: Functional Transfers       Dates: Start: 11/03/22         Goal: STG-Within one week, patient will transfer to toilet w/ consistent SBA       Dates: Start: 11/03/22            Goal: STG-Within one week, patient will transfer to step in shower w/ consistent SBA       Dates: Start: 11/03/22               Problem: OT Long Term Goals       Dates: Start: 11/03/22         Goal: LTG-By discharge, patient will complete basic self care tasks w/ mod I       Dates: Start: 11/03/22            Goal: LTG-By discharge, patient will perform bathroom transfers w/ mod I       Dates: Start: 11/03/22            Goal: LTG-By discharge, patient will complete basic home management w/ mod I       Dates: Start: 11/03/22

## 2022-11-05 NOTE — FLOWSHEET NOTE
11/05/22 1255   Vital Signs   Pulse 64   Respiration 16   Pulse Oximetry 98 %   $ Pulse Oximetry (Spot Check) Yes   Respiratory Assessment   Level of Consciousness Alert   Chest Exam   Work Of Breathing / Effort Within Normal Limits   Oxygen   O2 Delivery Device None - Room Air

## 2022-11-05 NOTE — THERAPY
Physical Therapy   Daily Treatment     Patient Name: Nerissa Dennis  Age:  42 y.o., Sex:  female  Medical Record #: [id number]  Today's Date: 11/5/2022     Precautions  Precautions: Fall Risk, Posterior Hip Precautions, Cervical Collar  , Lumbosacral Orthosis, Spinal / Back Precautions , Weight Bearing As Tolerated Left Lower Extremity  Comments: LSO, cervical collar, L ankle aircast    Subjective    Patient is laying in bed, states she has no pain for the first time in 12 hours.      Objective       11/05/22 1031   PT Charge Group   PT Gait Training 2   PT Therapeutic Exercise 2   PT Total Time Spent   PT Individual Total Time Spent (Mins) 60   Gait Functional Level of Assist    Gait Level Of Assist Standby Assist   Assistive Device None   Distance (Feet) 400   # of Times Distance was Traveled 1   Deviation Antalgic;Trendelenberg;Bradykinetic;Decreased Base Of Support  (weakness in R hip with stance phase/ drop L hip. Pain in L ankle with heel strike/ stance)   Sitting Lower Body Exercises   Ankle Pumps 2 sets of 10;Bilateral   Hip Abduction 2 sets of 10;Bilateral;Light Resistance Theraband   Long Arc Quad 2 sets of 10;Bilateral   Hamstring Curl 2 sets of 10;Bilateral;Light Resistance Theraband   Sit to Stand 1 set of 10  (reinforced hip precautions)   Standing Lower Body Exercises   Hamstring Curl 1 set of 10;Bilateral    Hip Abduction 1 set of 10;Bilateral   Heel Rise 2 sets of 10;Bilateral   Mini Squat Partial;1 set of 10   Interdisciplinary Plan of Care Collaboration   Patient Position at End of Therapy Seated;Call Light within Reach;Tray Table within Reach;Phone within Reach  (seated in wheelchair, wheel brakes on)     38/56 on Yee with limitations with turning to look over shoulder, picking up items, tandem stance due to back/ hip/ ankle precautions. Whole body turn 360 is slow due to hip precautions (limited IR, pain with weight shift)    Assessment    Patient demonstrates good strength, safety awareness. Her  balance is limited by pain and hip precautions. She is motivated to improve function, is making gains.      Strengths: Able to follow instructions, Effective communication skills, Good carryover of learning, Good insight into deficits/needs, Independent prior level of function, Motivated for self care and independence, Pleasant and cooperative, Supportive family, Willingly participates in therapeutic activities  Barriers: Decreased endurance, Fatigue, Home accessibility, Impaired activity tolerance, Limited mobility, Pain, Pain poorly managed    Plan    AAROM/ stretching as tolerated, progressive ambulation without device vs FWW, general strength and endurance training.    Passport items to be completed:  Get in/out of bed safely, in/out of a vehicle, safely use mobility device, walk or wheel around home/community, navigate up and down stairs, show how to get up/down from the ground, ensure home is accessible, demonstrate HEP, complete caregiver training    Physical Therapy Problems (Active)       Problem: Mobility       Dates: Start: 11/03/22         Goal: STG-Within one week, patient will ambulate household distance x 100 feet without AD and SBA       Dates: Start: 11/03/22               Problem: Mobility Transfers       Dates: Start: 11/03/22         Goal: STG-Within one week, patient will perform bed mobility using log roll method with SBA       Dates: Start: 11/03/22            Goal: STG-Within one week, patient will transfer bed to chair without AD and SPV       Dates: Start: 11/03/22               Problem: PT-Long Term Goals       Dates: Start: 11/03/22         Goal: LTG-By discharge, patient will ambulate x 150 feet without AD and mod I for extra time       Dates: Start: 11/03/22            Goal: LTG-By discharge, patient will transfer one surface to another independent no AD       Dates: Start: 11/03/22            Goal: LTG-By discharge, patient will transfer in/out of a car without AD and SPV       Dates:  Start: 11/03/22

## 2022-11-05 NOTE — FLOWSHEET NOTE
"   11/05/22 1200   Incentive Spirometry Treatment   Height 1.6 m (5' 2.99\")   Predicted Inspiratory Capacity 2250   60% of predicted IS capacity 1350 mL   40% of predicted IS capacity 900 mL   Incentive Spirometer Volume 2250 mL     "

## 2022-11-05 NOTE — CARE PLAN
The patient is Watcher - Medium risk of patient condition declining or worsening    Shift Goals  Clinical Goals: Safety, Pain management    Problem: Skin Integrity  Goal: Patient's skin integrity will be maintained or improve  Outcome: Progressing  Note: Bruising and swelling to LLE, facial scabbing NETO, will continue to monitor.     Problem: Pain - Standard  Goal: Alleviation of pain or a reduction in pain to the patient’s comfort goal  Note: Patient is having 6/10 generalized pain, PRN oxy and scheduled pain meds given, will continue to monitor.

## 2022-11-05 NOTE — THERAPY
Occupational Therapy  Daily Treatment     Patient Name: Nerissa Dennis  Age:  42 y.o., Sex:  female  Medical Record #: [id number]  Today's Date: 11/5/2022     Precautions  Precautions: Fall Risk, Posterior Hip Precautions, Cervical Collar  , Lumbosacral Orthosis, Spinal / Back Precautions , Weight Bearing As Tolerated Left Lower Extremity  Comments: LSO, cervical collar, L ankle aircast         Subjective    Patient received up in w/c in bathroom, did not have cervical collar on or LSO, reports she took cervical collar on overnight due to difficulty sleeping. Reports increased nerve pain in R shoulder overnight     Objective       11/05/22 0831   OT Charge Group   OT Neuromuscular Re-education / Balance 2   OT Total Time Spent   OT Individual Total Time Spent (Mins) 30     Patient and  educated on need for wearing cervical collar at all times including while sleeping per neurosurgical recommendation, initiated education with patient and  on doffing/donning as well as adjusting for appropriate fit to provide support for cervical injury and pain management. Discussed need for LSO OOB, initiated education on donning. Patient and  verbalized understanding of wear schedule, did not return demo doffing/donning techniques, would benefit from follow up education with , anticipate pt will have difficulty managing c-collar and brace at this time due to RUE injuries and associated decreased function.     Patient's RUE placed in Saebo MAS level 5 tension to reduce weight of RUE, completed 1x20 reps horizontal abduction/adduction with OT assist for full abduction, gradual improvement in adduction with repetition, min facilitation at scapula for increased mobility. Completed 1x20 reps elbow flex/extension with no assist.       Assessment    Patient tolerated session well, demos activation in R shoulder with gravity eliminated via mobile arm support, was excited to see her shoulder moving. Patient and   would benefit from follow up education on c-collar and LSO wear schedule and management, anticipate pt will have difficulty managing c-collar and brace at this time due to RUE injuries and associated decreased function.     Strengths: Able to follow instructions, Alert and oriented, Effective communication skills, Good insight into deficits/needs, Independent prior level of function, Motivated for self care and independence, Pleasant and cooperative, Supportive family, Willingly participates in therapeutic activities  Barriers: Decreased endurance, Generalized weakness, Home accessibility, Impaired activity tolerance, Impaired balance, Limited mobility, Pain    Plan    RUE neuro re-ed/sensory re-education (Saebo, AAROM as tolerated, vibration, TENS, NMES, WB as tolerated)    Occupational Therapy Goals (Active)       Problem: Dressing       Dates: Start: 11/03/22         Goal: STG-Within one week, patient will dress UB w/ set-up for shirt and min-mod A for c-collar        Dates: Start: 11/03/22            Goal: STG-Within one week, patient will dress LB w/ set-up to  SBA       Dates: Start: 11/03/22               Problem: Functional Transfers       Dates: Start: 11/03/22         Goal: STG-Within one week, patient will transfer to toilet w/ consistent SBA       Dates: Start: 11/03/22            Goal: STG-Within one week, patient will transfer to step in shower w/ consistent SBA       Dates: Start: 11/03/22               Problem: OT Long Term Goals       Dates: Start: 11/03/22         Goal: LTG-By discharge, patient will complete basic self care tasks w/ mod I       Dates: Start: 11/03/22            Goal: LTG-By discharge, patient will perform bathroom transfers w/ mod I       Dates: Start: 11/03/22            Goal: LTG-By discharge, patient will complete basic home management w/ mod I       Dates: Start: 11/03/22

## 2022-11-06 PROCEDURE — 700101 HCHG RX REV CODE 250: Performed by: PHYSICAL MEDICINE & REHABILITATION

## 2022-11-06 PROCEDURE — 97110 THERAPEUTIC EXERCISES: CPT

## 2022-11-06 PROCEDURE — 770010 HCHG ROOM/CARE - REHAB SEMI PRIVAT*

## 2022-11-06 PROCEDURE — 700102 HCHG RX REV CODE 250 W/ 637 OVERRIDE(OP): Performed by: PHYSICAL MEDICINE & REHABILITATION

## 2022-11-06 PROCEDURE — A9270 NON-COVERED ITEM OR SERVICE: HCPCS | Performed by: PHYSICAL MEDICINE & REHABILITATION

## 2022-11-06 PROCEDURE — 97140 MANUAL THERAPY 1/> REGIONS: CPT

## 2022-11-06 PROCEDURE — 700111 HCHG RX REV CODE 636 W/ 250 OVERRIDE (IP): Performed by: PHYSICAL MEDICINE & REHABILITATION

## 2022-11-06 PROCEDURE — 97116 GAIT TRAINING THERAPY: CPT

## 2022-11-06 RX ADMIN — ENOXAPARIN SODIUM 30 MG: 30 INJECTION SUBCUTANEOUS at 08:06

## 2022-11-06 RX ADMIN — Medication 1 TABLET: at 11:32

## 2022-11-06 RX ADMIN — ENOXAPARIN SODIUM 30 MG: 30 INJECTION SUBCUTANEOUS at 20:24

## 2022-11-06 RX ADMIN — Medication 6 MG: at 20:25

## 2022-11-06 RX ADMIN — GABAPENTIN 600 MG: 300 CAPSULE ORAL at 14:27

## 2022-11-06 RX ADMIN — ACETAMINOPHEN 500 MG: 500 TABLET ORAL at 14:27

## 2022-11-06 RX ADMIN — OXYCODONE HYDROCHLORIDE 10 MG: 10 TABLET ORAL at 21:13

## 2022-11-06 RX ADMIN — OXYCODONE 5 MG: 5 TABLET ORAL at 11:33

## 2022-11-06 RX ADMIN — OMEPRAZOLE 20 MG: 20 CAPSULE, DELAYED RELEASE ORAL at 08:03

## 2022-11-06 RX ADMIN — ACETAMINOPHEN 500 MG: 500 TABLET ORAL at 20:24

## 2022-11-06 RX ADMIN — POLYETHYLENE GLYCOL 3350 1 PACKET: 17 POWDER, FOR SOLUTION ORAL at 08:06

## 2022-11-06 RX ADMIN — LIDOCAINE 3 PATCH: 50 PATCH CUTANEOUS at 08:05

## 2022-11-06 RX ADMIN — METAXALONE 800 MG: 800 TABLET ORAL at 14:27

## 2022-11-06 RX ADMIN — ACETAMINOPHEN 500 MG: 500 TABLET ORAL at 08:02

## 2022-11-06 RX ADMIN — GABAPENTIN 600 MG: 300 CAPSULE ORAL at 08:03

## 2022-11-06 RX ADMIN — METAXALONE 800 MG: 800 TABLET ORAL at 08:03

## 2022-11-06 RX ADMIN — GABAPENTIN 600 MG: 300 CAPSULE ORAL at 20:24

## 2022-11-06 RX ADMIN — DULOXETINE HYDROCHLORIDE 30 MG: 30 CAPSULE, DELAYED RELEASE ORAL at 08:03

## 2022-11-06 RX ADMIN — OXYCODONE HYDROCHLORIDE 10 MG: 10 TABLET ORAL at 04:15

## 2022-11-06 RX ADMIN — OXYCODONE 5 MG: 5 TABLET ORAL at 18:12

## 2022-11-06 RX ADMIN — METAXALONE 800 MG: 800 TABLET ORAL at 20:24

## 2022-11-06 RX ADMIN — ACETAMINOPHEN 500 MG: 500 TABLET ORAL at 18:12

## 2022-11-06 RX ADMIN — DEXAMETHASONE 2 MG: 1 TABLET ORAL at 08:07

## 2022-11-06 RX ADMIN — OXYCODONE HYDROCHLORIDE 10 MG: 10 TABLET ORAL at 08:03

## 2022-11-06 ASSESSMENT — GAIT ASSESSMENTS
GAIT LEVEL OF ASSIST: STANDBY ASSIST
DISTANCE (FEET): 400
DEVIATION: ANTALGIC

## 2022-11-06 ASSESSMENT — PATIENT HEALTH QUESTIONNAIRE - PHQ9
1. LITTLE INTEREST OR PLEASURE IN DOING THINGS: NOT AT ALL
2. FEELING DOWN, DEPRESSED, IRRITABLE, OR HOPELESS: NOT AT ALL
SUM OF ALL RESPONSES TO PHQ9 QUESTIONS 1 AND 2: 0

## 2022-11-06 ASSESSMENT — PAIN DESCRIPTION - PAIN TYPE: TYPE: ACUTE PAIN

## 2022-11-06 ASSESSMENT — PAIN SCALES - WONG BAKER
WONGBAKER_NUMERICALRESPONSE: HURTS A LITTLE MORE
WONGBAKER_NUMERICALRESPONSE: DOESN'T HURT AT ALL
WONGBAKER_NUMERICALRESPONSE: HURTS A LITTLE MORE

## 2022-11-06 NOTE — CARE PLAN
The patient is Stable - Low risk of patient condition declining or worsening    Shift Goals  Clinical Goals: safety  Patient Goals: safety    Problem: Pain - Standard  Goal: Alleviation of pain or a reduction in pain to the patient’s comfort goal  Outcome: Progressing Patient able to verbalize pain level and verbalize an acceptable level of pain.      Problem: Fall Risk - Rehab  Goal: Patient will remain free from falls  Outcome: Progressing patient has good safety awareness and is Mod I in her room at w/c level.

## 2022-11-06 NOTE — PROGRESS NOTES
Patient care assumed. Report received from [facility] IVORY Fritz. Patient is alert and calm, resting in bed. Call light and bedside table within reach. Will continue to monitor.

## 2022-11-06 NOTE — THERAPY
"Physical Therapy   Daily Treatment     Patient Name: Nerissa Dennis  Age:  42 y.o., Sex:  female  Medical Record #: [id number]  Today's Date: 11/6/2022     Precautions  Precautions: Fall Risk, Posterior Hip Precautions, TLSO (Thoracolumbosacral orthosis), Cervical Collar  , Weight Bearing As Tolerated Left Lower Extremity  Comments: LSO OOB, cervical collar at all times, L ankle aircast    Subjective    Patient reports she allowed pain to get out of control last night and took her 6hours to get pain managed today in order to be able to do therapy; questioning brace orders for compliance; willing to participate in therapy just cautious about \"overdoing it\"; feels biggest remaining barrier if right arm functioning with brachial plexus injury     Objective       11/06/22 1431   PT Charge Group   PT Gait Training 2   PT Therapeutic Exercise 1   PT Manual Therapy 1   PT Total Time Spent   PT Individual Total Time Spent (Mins) 60   Precautions   Precautions Fall Risk;Posterior Hip Precautions;TLSO (Thoracolumbosacral orthosis);Cervical Collar  ;Weight Bearing As Tolerated Left Lower Extremity   Comments LSO OOB, cervical collar at all times, L ankle aircast   Gait Functional Level of Assist    Gait Level Of Assist Standby Assist   Assistive Device None   Distance (Feet) 400  (400ft x 2)   # of Times Distance was Traveled 2   Deviation Antalgic  (pain in ankle limits ambulation distance)   Supine Lower Body Exercise   Comments shoulder PROM- flexion, abduction, IR, ER to patient tolerance  (xwcigbb=314°, abduction=40°, ER=5°, IR=WNL)   Sitting Lower Body Exercises   Comments Nu-Step 10min for endurance   Interdisciplinary Plan of Care Collaboration   IDT Collaboration with  Family / Caregiver   Collaboration Comments observed therapy session   CP given for right shoulder at end of session    Assessment    Patient self-limits walking distance appropriately due to ankle/hip pain with long distances; PROM right shoulder to " patient tolerance reveals limited right shoulder ROM in all planes (except IR WNL)    Strengths: Able to follow instructions, Effective communication skills, Good carryover of learning, Good insight into deficits/needs, Independent prior level of function, Motivated for self care and independence, Pleasant and cooperative, Supportive family, Willingly participates in therapeutic activities  Barriers: Decreased endurance, Fatigue, Home accessibility, Impaired activity tolerance, Limited mobility, Pain, Pain poorly managed    Plan    AAROM/ stretching as tolerated, progressive ambulation without device vs FWW, general strength and endurance training.     Passport items to be completed:  Get in/out of bed safely, in/out of a vehicle, safely use mobility device, walk or wheel around home/community, navigate up and down stairs, show how to get up/down from the ground, ensure home is accessible, demonstrate HEP, complete caregiver training    Physical Therapy Problems (Active)       Problem: Mobility       Dates: Start: 11/03/22         Goal: STG-Within one week, patient will ambulate household distance x 100 feet without AD and SBA       Dates: Start: 11/03/22               Problem: Mobility Transfers       Dates: Start: 11/03/22         Goal: STG-Within one week, patient will perform bed mobility using log roll method with SBA       Dates: Start: 11/03/22            Goal: STG-Within one week, patient will transfer bed to chair without AD and SPV       Dates: Start: 11/03/22               Problem: PT-Long Term Goals       Dates: Start: 11/03/22         Goal: LTG-By discharge, patient will ambulate x 150 feet without AD and mod I for extra time       Dates: Start: 11/03/22            Goal: LTG-By discharge, patient will transfer one surface to another independent no AD       Dates: Start: 11/03/22            Goal: LTG-By discharge, patient will transfer in/out of a car without AD and SPV       Dates: Start: 11/03/22

## 2022-11-06 NOTE — CARE PLAN
The patient is Stable - Low risk of patient condition declining or worsening    Shift Goals  Clinical Goals: Safety, Pain management  Patient Goals: sleep well, pain management    Problem: Fall Risk - Rehab  Goal: Patient will remain free from falls  Outcome: Progressing  Note: Julia Aguero Fall risk Assessment Score: 7    Low fall risk interventions   - Call light within reach   - Yellow  socks   - Belongings within reach   - Bed in the lowest position         Problem: Skin Integrity  Goal: Patient's skin integrity will be maintained or improve  Outcome: Progressing  Note:   Bernardino Score: 19    Patient's skin remains intact and free from new or accidental injury this shift; no s/s of infection. RN wound protocol checked. Encouraged hydration and educated about the importance of nutrition to keep skin integrity. Will continue to monitor.

## 2022-11-07 LAB
ALBUMIN SERPL BCP-MCNC: 4.1 G/DL (ref 3.2–4.9)
ALBUMIN/GLOB SERPL: 1.8 G/DL
ALP SERPL-CCNC: 78 U/L (ref 30–99)
ALT SERPL-CCNC: 120 U/L (ref 2–50)
ANION GAP SERPL CALC-SCNC: 10 MMOL/L (ref 7–16)
AST SERPL-CCNC: 33 U/L (ref 12–45)
BASOPHILS # BLD AUTO: 0.5 % (ref 0–1.8)
BASOPHILS # BLD: 0.04 K/UL (ref 0–0.12)
BILIRUB SERPL-MCNC: 0.4 MG/DL (ref 0.1–1.5)
BUN SERPL-MCNC: 16 MG/DL (ref 8–22)
CALCIUM SERPL-MCNC: 9 MG/DL (ref 8.5–10.5)
CHLORIDE SERPL-SCNC: 99 MMOL/L (ref 96–112)
CO2 SERPL-SCNC: 29 MMOL/L (ref 20–33)
CREAT SERPL-MCNC: 0.66 MG/DL (ref 0.5–1.4)
EOSINOPHIL # BLD AUTO: 0.19 K/UL (ref 0–0.51)
EOSINOPHIL NFR BLD: 2.6 % (ref 0–6.9)
ERYTHROCYTE [DISTWIDTH] IN BLOOD BY AUTOMATED COUNT: 48 FL (ref 35.9–50)
GFR SERPLBLD CREATININE-BSD FMLA CKD-EPI: 112 ML/MIN/1.73 M 2
GLOBULIN SER CALC-MCNC: 2.3 G/DL (ref 1.9–3.5)
GLUCOSE SERPL-MCNC: 71 MG/DL (ref 65–99)
HCT VFR BLD AUTO: 37.9 % (ref 37–47)
HGB BLD-MCNC: 12.3 G/DL (ref 12–16)
IMM GRANULOCYTES # BLD AUTO: 0.11 K/UL (ref 0–0.11)
IMM GRANULOCYTES NFR BLD AUTO: 1.5 % (ref 0–0.9)
LYMPHOCYTES # BLD AUTO: 3.47 K/UL (ref 1–4.8)
LYMPHOCYTES NFR BLD: 47.6 % (ref 22–41)
MCH RBC QN AUTO: 33.1 PG (ref 27–33)
MCHC RBC AUTO-ENTMCNC: 32.5 G/DL (ref 33.6–35)
MCV RBC AUTO: 101.9 FL (ref 81.4–97.8)
MONOCYTES # BLD AUTO: 0.47 K/UL (ref 0–0.85)
MONOCYTES NFR BLD AUTO: 6.4 % (ref 0–13.4)
NEUTROPHILS # BLD AUTO: 3.01 K/UL (ref 2–7.15)
NEUTROPHILS NFR BLD: 41.4 % (ref 44–72)
NRBC # BLD AUTO: 0 K/UL
NRBC BLD-RTO: 0 /100 WBC
PLATELET # BLD AUTO: 506 K/UL (ref 164–446)
PMV BLD AUTO: 8.6 FL (ref 9–12.9)
POTASSIUM SERPL-SCNC: 3.5 MMOL/L (ref 3.6–5.5)
PROT SERPL-MCNC: 6.4 G/DL (ref 6–8.2)
RBC # BLD AUTO: 3.72 M/UL (ref 4.2–5.4)
SODIUM SERPL-SCNC: 138 MMOL/L (ref 135–145)
WBC # BLD AUTO: 7.3 K/UL (ref 4.8–10.8)

## 2022-11-07 PROCEDURE — 97530 THERAPEUTIC ACTIVITIES: CPT

## 2022-11-07 PROCEDURE — 80053 COMPREHEN METABOLIC PANEL: CPT

## 2022-11-07 PROCEDURE — A9270 NON-COVERED ITEM OR SERVICE: HCPCS | Performed by: PHYSICAL MEDICINE & REHABILITATION

## 2022-11-07 PROCEDURE — 700102 HCHG RX REV CODE 250 W/ 637 OVERRIDE(OP): Performed by: PHYSICAL MEDICINE & REHABILITATION

## 2022-11-07 PROCEDURE — 99233 SBSQ HOSP IP/OBS HIGH 50: CPT | Performed by: PHYSICAL MEDICINE & REHABILITATION

## 2022-11-07 PROCEDURE — 97112 NEUROMUSCULAR REEDUCATION: CPT

## 2022-11-07 PROCEDURE — 306637 HCHG MISC ORTHO ITEM RC 0274

## 2022-11-07 PROCEDURE — 770010 HCHG ROOM/CARE - REHAB SEMI PRIVAT*

## 2022-11-07 PROCEDURE — 97535 SELF CARE MNGMENT TRAINING: CPT

## 2022-11-07 PROCEDURE — 90791 PSYCH DIAGNOSTIC EVALUATION: CPT | Performed by: PSYCHOLOGIST

## 2022-11-07 PROCEDURE — L0637 LSO SC R ANT/POS PNL PRE CST: HCPCS

## 2022-11-07 PROCEDURE — 700111 HCHG RX REV CODE 636 W/ 250 OVERRIDE (IP): Performed by: PHYSICAL MEDICINE & REHABILITATION

## 2022-11-07 PROCEDURE — 700101 HCHG RX REV CODE 250: Performed by: PHYSICAL MEDICINE & REHABILITATION

## 2022-11-07 PROCEDURE — 36415 COLL VENOUS BLD VENIPUNCTURE: CPT

## 2022-11-07 PROCEDURE — 85025 COMPLETE CBC W/AUTO DIFF WBC: CPT

## 2022-11-07 PROCEDURE — 97140 MANUAL THERAPY 1/> REGIONS: CPT

## 2022-11-07 PROCEDURE — LO189, L0637 HCHG MISC ORTHO ITEM RC 0274

## 2022-11-07 RX ORDER — POTASSIUM CHLORIDE 20 MEQ/1
20 TABLET, EXTENDED RELEASE ORAL ONCE
Status: COMPLETED | OUTPATIENT
Start: 2022-11-07 | End: 2022-11-07

## 2022-11-07 RX ORDER — GABAPENTIN 300 MG/1
900 CAPSULE ORAL 3 TIMES DAILY
Status: DISCONTINUED | OUTPATIENT
Start: 2022-11-07 | End: 2022-11-11 | Stop reason: HOSPADM

## 2022-11-07 RX ADMIN — OXYCODONE HYDROCHLORIDE 10 MG: 10 TABLET ORAL at 16:57

## 2022-11-07 RX ADMIN — LIDOCAINE 2 PATCH: 50 PATCH CUTANEOUS at 08:24

## 2022-11-07 RX ADMIN — GABAPENTIN 600 MG: 300 CAPSULE ORAL at 08:25

## 2022-11-07 RX ADMIN — METAXALONE 800 MG: 800 TABLET ORAL at 16:49

## 2022-11-07 RX ADMIN — OMEPRAZOLE 20 MG: 20 CAPSULE, DELAYED RELEASE ORAL at 08:27

## 2022-11-07 RX ADMIN — POLYETHYLENE GLYCOL 3350 1 PACKET: 17 POWDER, FOR SOLUTION ORAL at 08:27

## 2022-11-07 RX ADMIN — OXYCODONE HYDROCHLORIDE 10 MG: 10 TABLET ORAL at 08:26

## 2022-11-07 RX ADMIN — POTASSIUM CHLORIDE 20 MEQ: 1500 TABLET, EXTENDED RELEASE ORAL at 12:18

## 2022-11-07 RX ADMIN — DULOXETINE HYDROCHLORIDE 30 MG: 30 CAPSULE, DELAYED RELEASE ORAL at 08:25

## 2022-11-07 RX ADMIN — ACETAMINOPHEN 500 MG: 500 TABLET ORAL at 19:39

## 2022-11-07 RX ADMIN — METAXALONE 800 MG: 800 TABLET ORAL at 19:39

## 2022-11-07 RX ADMIN — ENOXAPARIN SODIUM 30 MG: 30 INJECTION SUBCUTANEOUS at 08:25

## 2022-11-07 RX ADMIN — METAXALONE 800 MG: 800 TABLET ORAL at 08:26

## 2022-11-07 RX ADMIN — OXYCODONE HYDROCHLORIDE 10 MG: 10 TABLET ORAL at 05:11

## 2022-11-07 RX ADMIN — GABAPENTIN 900 MG: 300 CAPSULE ORAL at 16:54

## 2022-11-07 RX ADMIN — ACETAMINOPHEN 500 MG: 500 TABLET ORAL at 16:49

## 2022-11-07 RX ADMIN — Medication 1 TABLET: at 12:18

## 2022-11-07 RX ADMIN — OXYCODONE HYDROCHLORIDE 10 MG: 10 TABLET ORAL at 12:18

## 2022-11-07 RX ADMIN — Medication 6 MG: at 19:40

## 2022-11-07 RX ADMIN — ENOXAPARIN SODIUM 30 MG: 30 INJECTION SUBCUTANEOUS at 19:39

## 2022-11-07 RX ADMIN — GABAPENTIN 900 MG: 300 CAPSULE ORAL at 19:39

## 2022-11-07 RX ADMIN — ACETAMINOPHEN 500 MG: 500 TABLET ORAL at 08:26

## 2022-11-07 ASSESSMENT — GAIT ASSESSMENTS
DISTANCE (FEET): 600
DEVIATION: BRADYKINETIC
DISTANCE (FEET): 200
DEVIATION: BRADYKINETIC
GAIT LEVEL OF ASSIST: INDEPENDENT
GAIT LEVEL OF ASSIST: SUPERVISED

## 2022-11-07 ASSESSMENT — BALANCE ASSESSMENTS
STANDING UNSUPPORTED WITH FEET TOGETHER: 4
PICK UP OBJECT FROM THE FLOOR FROM A STANDING POSITION: 4
TURN 360 DEGREES: 2
LONG VERSION TOTAL SCORE (MAX 56): 49
SITTING UNSUPPORTED: 4
TRANSFERS: 4
SITTING TO STANDING: 4
STANDING TO SITTING: 4
STANDING ON ONE LEG: 2
LONG VERSION TOTAL SCORE (MAX 56): 49
LOOK OVER LEFT AND RIGHT SHOULDERS WHILE STANDING: 4
STANDING UNSUPPORTED WITH EYES CLOSED: 4
PLACE ALTERNATE FOOT ON STEP OR STOOL WHILE STANDING UNSUPPORTED: 2
REACHING FORWARD WITH OUTSTRETCHED ARM WHILE STANDING: 4
STANDING UNSUPPORTED: 4
STANDING UNSUPPORTED ONE FOOT IN FRONT: 3

## 2022-11-07 ASSESSMENT — PAIN DESCRIPTION - PAIN TYPE: TYPE: ACUTE PAIN

## 2022-11-07 ASSESSMENT — ACTIVITIES OF DAILY LIVING (ADL): BED_CHAIR_WHEELCHAIR_TRANSFER_DESCRIPTION: OTHER (COMMENT)

## 2022-11-07 ASSESSMENT — PAIN SCALES - WONG BAKER: WONGBAKER_NUMERICALRESPONSE: DOESN'T HURT AT ALL

## 2022-11-07 NOTE — CARE PLAN
Problem: Dressing  Goal: STG-Within one week, patient will dress UB w/ set-up for shirt and min-mod A for c-collar   Outcome: Not Met  Goal: STG-Within one week, patient will dress LB w/ set-up to  SBA  Outcome: Not Met     Problem: Functional Transfers  Goal: STG-Within one week, patient will transfer to toilet w/ consistent SBA  Outcome: Met  Goal: STG-Within one week, patient will transfer to step in shower w/ consistent SBA  Outcome: Met

## 2022-11-07 NOTE — THERAPY
Occupational Therapy  Daily Treatment     Patient Name: Nerissa Dennis  Age:  42 y.o., Sex:  female  Medical Record #: [id number]  Today's Date: 11/7/2022     Precautions  Precautions: Fall Risk, Posterior Hip Precautions, TLSO (Thoracolumbosacral orthosis), Cervical Collar  , Weight Bearing As Tolerated Left Lower Extremity  Comments: LSO OOB, cervical collar at all times, L ankle aircast         Subjective    Pt received seated EOB, agreeable to OT     Objective       11/07/22 1431   OT Charge Group   OT Therapy Activity 2   OT Total Time Spent   OT Individual Total Time Spent (Mins) 30     Seated at tabletop for shoulder ROM; armskate applied to RUE for reduced friction, pt complete horizontal abduction/adduction 1x15 reps and shoulder flex/ext 1x15 reps, removed armskate and replaced with washcloth, completed concentric circles 1x15 reps clockwise and counterclockwise    Assessment    Patient tolerated session well, focus on progression of AROM of shoulder, able to complete at tabletop for weight reduction and with use of armskate or washcloth to reduce friction, good tolerance but requires frequent rest due to fatigue.     Strengths: Able to follow instructions, Alert and oriented, Effective communication skills, Good insight into deficits/needs, Independent prior level of function, Motivated for self care and independence, Pleasant and cooperative, Supportive family, Willingly participates in therapeutic activities  Barriers: Decreased endurance, Generalized weakness, Home accessibility, Impaired activity tolerance, Impaired balance, Limited mobility, Pain    Plan    RUE neuro re-ed/sensory re-education (Saebo, AAROM as tolerated, vibration, TENS, NMES, WB as tolerated)    Occupational Therapy Goals (Active)       Problem: Dressing       Dates: Start: 11/03/22         Goal: STG-Within one week, patient will dress UB w/ set-up for shirt and min-mod A for c-collar        Dates: Start: 11/03/22            Goal:  STG-Within one week, patient will dress LB w/ set-up to  SBA       Dates: Start: 11/03/22               Problem: OT Long Term Goals       Dates: Start: 11/03/22         Goal: LTG-By discharge, patient will complete basic self care tasks w/ mod I       Dates: Start: 11/03/22            Goal: LTG-By discharge, patient will perform bathroom transfers w/ mod I       Dates: Start: 11/03/22            Goal: LTG-By discharge, patient will complete basic home management w/ mod I       Dates: Start: 11/03/22
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
## 2022-11-07 NOTE — PROGRESS NOTES
REHABILITATION PROGRESS NOTE    Date of Admission: 11/2/2022    [facility] Code / Diagnosis to Support: 0014.2 - Major Multiple Trauma: Brain + Multiple Fracture/Amputation  Etiologic Diagnosis: Trauma    SUBJECTIVE  Patient seen in room working with therapy  GI: continent, last BM 11/6, LFTs improving with lower tylenol dose  : continent  Psych: slept well for the first time last night  MSK: reports overall better pain control but nerve pain in right shoulder worsening - increase gabapentin    I have performed a physical exam and reviewed and updated history and plan today (11/7/2022).     I, Kandi Ramirez D.O., was present and led the interdisciplinary team conference on 11/7/2022.  I led the IDT conference and agree with the IDT conference documentation and plan of care as noted below.   Slept for the first time last night. Pain improving but worsening burning in right leg. Walking no assistive device with supervision. Independent at the wheelchair level in the room. Barrier is hypersensitivity - tolerating vibration.     Anticipated discharge date: 11/16/2022  Anticipated discharge destination: home with family  Prognosis: good  Equipment: TBD  Postdischarge therapies: TBD  Followup: ortho (Ministerio Watts MD), Neuro ([facility]), PCP  Precautions: Weight bearing as tolerated in left lower limb. LSO when out of bed (don at edge of bed). C collar at all times. posterior hip precautions  Code: full resuscitation    Patient was seen for 36 minutes on unit/floor of which > 50% of time was spent on counseling and coordination of care (including attending the interdisciplinary team conference) regarding the above, including prognosis, risk reduction, benefits of treatment, and options for next stage of care.    MEDICATIONS:  Current Facility-Administered Medications   Medication Dose    potassium chloride SA (Kdur) tablet 20 mEq  20 mEq    DULoxetine (CYMBALTA) capsule 30 mg  30 mg    melatonin tablet 6 mg  6 mg     "acetaminophen (TYLENOL) tablet 500 mg  500 mg    vitamin D2 (Ergocalciferol) (Drisdol) capsule 50,000 Units  50,000 Units    gabapentin (NEURONTIN) capsule 600 mg  600 mg    Respiratory Therapy Consult      Pharmacy Consult Request ...Pain Management Review 1 Each  1 Each    omeprazole (PRILOSEC) capsule 20 mg  20 mg    mag hydrox-al hydrox-simeth (MAALOX PLUS ES or MYLANTA DS) suspension 20 mL  20 mL    ondansetron (ZOFRAN ODT) dispertab 4 mg  4 mg    Or    ondansetron (ZOFRAN) syringe/vial injection 4 mg  4 mg    sodium chloride (OCEAN) 0.65 % nasal spray 2 Spray  2 Spray    therapeutic multivitamin-minerals (THERAGRAN-M) tablet 1 Tablet  1 Tablet    acetaminophen (TYLENOL) tablet 500 mg  500 mg    magnesium hydroxide (MILK OF MAGNESIA) suspension 30 mL  30 mL    polyethylene glycol/lytes (MIRALAX) PACKET 1 Packet  1 Packet    enoxaparin (Lovenox) inj 30 mg  30 mg    lidocaine (LIDODERM) 5 % 1-3 Patch  1-3 Patch    metaxalone (Skelaxin) tablet 800 mg  800 mg    oxyCODONE immediate-release (ROXICODONE) tablet 5 mg  5 mg    Or    oxyCODONE immediate release (ROXICODONE) tablet 10 mg  10 mg       PHYSICAL EXAM:     VITAL SIGNS:   height is 1.6 m (5' 2.99\") and weight is 70 kg (154 lb 5.2 oz). Her oral temperature is 36.5 °C (97.7 °F). Her blood pressure is 104/78 and her pulse is 70. Her respiration is 18 and oxygen saturation is 96%.     General: well-groomed in no acute distress, sitting up on edge of bed participating in therapies, LSO and C collar in place  Eyes: no scleral icterus or conjunctival injection  Ears, nose, mouth and throat: moist oral mucosa  Cardiovascular: good peripheral perfusion  Respiratory: breathing comfortably without use of accessory muscles  Gastrointestinal: nondistended  Genitourinary: no indwelling beck  Skin: no wounds seen on exposed skin    Neurologic:  Mental status:  A&Ox4 (person, place, date, situation) answers questions appropriately follows commands  Speech: fluent, no " aphasia or dysarthria  Good trunk support unassisted sitting in wheelchair  Tone: no spasticity noted  Psychiatric: appropriate affect  Hematologic/lymphatic/immunologic: no IV access    LABS:  Recent Labs     11/07/22  0533   SODIUM 138   POTASSIUM 3.5*   CHLORIDE 99   CO2 29   GLUCOSE 71   BUN 16   CREATININE 0.66   CALCIUM 9.0     Recent Labs     11/07/22  0533   WBC 7.3   RBC 3.72*   HEMOGLOBIN 12.3   HEMATOCRIT 37.9   .9*   MCH 33.1*   MCHC 32.5*   RDW 48.0   PLATELETCT 506*   MPV 8.6*             PRIMARY REHAB DIAGNOSIS:    This patient is a 42 y.o. female admitted for acute inpatient rehabilitation with   Trauma.    Medical management / Rehabilitation Issues/ Adverse Potential as part of rehabilitation plan     REHABILITATION ISSUES/ADVERSE POTENTIAL and MEDICAL CO-MORBIDITIES/ADVERSE POTENTIAL AFFECTING FUNCTION:    ##MSK  #Impaired ADLs and mobility  Patient is admitted to [facility] for comprehensive rehabilitation therapy as described.   Rehabilitation nursing monitors bowel and bladder control, educates on medication administration, co-morbidities and monitors patient safety.  Anticipated discharge date: 11/16/2022  Anticipated discharge destination: home with family  Prognosis: good  Equipment: TBD  Postdischarge therapies: TBD  Followup: ortho (Ministerio Watts MD), Neuro ([facility]), PCP  Precautions: Weight bearing as tolerated in left lower limb. LSO when out of bed (don at edge of bed). C collar at all times. posterior hip precautions  Code: full resuscitation    #Posttraumatic pain, acute, controlled  #Neuropathic pain, acute, uncontrolled  Continue lidoderm patch 5% 1-3 patches daily, skelaxin 800mg TID, oxycodone 5-10mg Q3hr PRN pain, cymbalta 30mg daily  Ordered increase to gabapentin 900mg TID    #Polytrauma  fracture of the distal fibula  C6 spinous process fracture  Right T12, L1, and L2 transverse process fractures  Left L4 superior facet fracture  Fracture through the  superolateral L4 superior endplate  Posterior left 10th and 11th rib fractures. Right posterior third rib fracture is seen. Right posterior 12th rib fracture at the costovertebral junction  Bilateral nasal bone fractures  Left hip dislocation  Right shoulder with Edema involving the soft tissues surrounding the scapular body possibly representing nondisplaced scapular body fracture; Strain/contusion of the trapezius, deltoid, supraspinatus, infraspinatus, subscapularis and teres minor muscles.  MRI Cspine interspinous lig stretch injury no overt Fx aside form C6 sp, no sign of nerve root injury on scan     ##NEURO  #Acute moderate traumatic brain injury  3.3 mm left posterior subdural hematoma, bilateral nasal bone fracture  LOC less than five minutes per documentation  Continue keppra 500mg BID for seizure prophylaxis until 11/5  SLP    #Possible right brachial plexus injury  Ordered dexamethasone taper  EMG followup if not improving   Monitor    #RESP  #Incidental pulmonary nodule  Patient works in [employer]  High risk - per radiology, optional CT in 12 months    Respiratory therapy: RT performs O2 management prn, breathing retraining, pulmonary hygiene and bronchospasm management prn to optimize participation in therapies.     ##CARDIAC  DVT prophylaxis: Continue lovenox 30mg BID. Encourage OOB. Monitor daily for signs and symptoms of DVT including but not limited to swelling and pain to prevent the development of DVT that may interfere with therapies.    ##GI  GI prophylaxis:  Continue prilosec 20mg daily to prevent gastritis/dyspepsia which may interfere with therapies.  #At risk of opioid induced constipation  Goal of one continent BM daily  Continue miralax 17g daily    #At high risk of malnutrition  Dietician monitors nutrient intake, recommend supplements prn and provide nutrition education to pt/family to promote optimal nutrition for wound healing/recovery.   Orders Placed This Encounter   Procedures    Diet  Order Diet: Regular     Standing Status:   Standing     Number of Occurrences:   1     Order Specific Question:   Diet:     Answer:   Regular [1]   Continue multivitamin daily  prealbumin levels 28.5    #Elevated liver enzymes  AST 98,   Monitor    ##HEME/ENDO  #Anemia  Monitor    #Vitamin D deficiency  Vit D 25 OH 12  Continue ergocalciferol 50,000 units weekly    ##SKIN  Skin/dermal ulcer prophylaxis: Monitor for new skin conditions with q.2 h. turns as required to prevent the development of skin breakdown.     Kandi Ramirez, DO  Physical Medicine and Rehabilitation

## 2022-11-07 NOTE — PROGRESS NOTES
Received shift report and assumed care of patient.  Patient awake, calm and stable, currently positioned in bed for comfort and safety; call light within reach.  4/10 neck and shoulder pain at this time.  Will continue to monitor.

## 2022-11-07 NOTE — CARE PLAN
The patient is Stable - Low risk of patient condition declining or worsening    Shift Goals  Clinical Goals: safety  Patient Goals: safety    Problem: Fall Risk - Rehab  Goal: Patient will remain free from falls  Outcome: Progressing  Note: Julia Aguero Fall risk Assessment Score: 7    Low fall risk interventions   - Call light within reach   - Yellow  socks   - Belongings within reach   - Bed in the lowest position     Problem: Skin Integrity  Goal: Patient's skin integrity will be maintained or improve  Outcome: Progressing  Note:   Bernardino Score: 19    Patient's skin remains intact and free from new or accidental injury this shift; no s/s of infection. RN wound protocol checked. Encouraged hydration and educated about the importance of nutrition to keep skin integrity. Will continue to monitor.

## 2022-11-07 NOTE — THERAPY
Occupational Therapy  Daily Treatment     Patient Name: Nerissa Dennis  Age:  42 y.o., Sex:  female  Medical Record #: [id number]  Today's Date: 11/7/2022     Precautions  Precautions: Fall Risk, Posterior Hip Precautions, TLSO (Thoracolumbosacral orthosis), Cervical Collar  , Weight Bearing As Tolerated Left Lower Extremity  Comments: LSO OOB, cervical collar at all times, L ankle aircast    Subjective    Patient in bed upon arrival, agreeable to participate in OT.      Objective     11/07/22 0831   OT Charge Group   OT Self Care / ADL 1   OT Neuromuscular Re-education / Balance 2   OT Therapy Activity 1   OT Total Time Spent   OT Individual Total Time Spent (Mins) 60   Precautions   Precautions Fall Risk;Posterior Hip Precautions;TLSO (Thoracolumbosacral orthosis);Cervical Collar  ;Weight Bearing As Tolerated Left Lower Extremity   Comments LSO OOB, cervical collar at all times, L ankle aircast   Cognition    Level of Consciousness Alert   Functional Level of Assist   Lower Body Dressing Standby Assist  (Set-up to don socks w/ sock aid (to adhere to L post hip precaution))   Bed, Chair, Wheelchair Transfer Independent  (no AD)   Interdisciplinary Plan of Care Collaboration   IDT Collaboration with  Physical Therapist   Patient Position at End of Therapy Seated  (EOM)   Collaboration Comments Transferred care to PT     Distant supervision for mobility without AD (room > therapy gym).    RUE positioned in Saebo MAS (level 5 tension); 2 sets x 10 protraction and horizontal abd/adduction exercises with min A provided for end range scap retraction.     Vibration applied to R scalene/levator scapulae muscles and proximal RUE. Patient cont to present w/ hypersensitivity brenda in mid-post delt region however able to tolerate vibration on level 1 and 2 frequencies. Vibration also applied to bicep while patient completed R elbow flexion/extension (AROM WFL) and anterior deltoid while patient completed shoulder flexion (1x10)  using interlaced hands technique.     Assessment    Patient tolerated OT session well with focus on RUE desensitization, ROM, strengthening. Patient now demo's AROM WFL in elbow against gravity and emerging AROM in R shoulder. Remains highly motivated/participatory. Reported improved tolerance of vibration w/ proximal RUE however cont to c/o burning/tingling sensation throughout RUE (proximal hypersensitivity > distal).   Strengths: Able to follow instructions, Alert and oriented, Effective communication skills, Good insight into deficits/needs, Independent prior level of function, Motivated for self care and independence, Pleasant and cooperative, Supportive family, Willingly participates in therapeutic activities  Barriers: Decreased endurance, Generalized weakness, Home accessibility, Impaired activity tolerance, Impaired balance, Limited mobility, Pain    Plan    RUE ROM/gentle strengthening/sensory re-education (Saebo, AAROM as tolerated, vibration, TENS, NMES, WB as tolerated)       Occupational Therapy Goals (Active)       Problem: Dressing       Dates: Start: 11/03/22         Goal: STG-Within one week, patient will dress UB w/ set-up for shirt and min-mod A for c-collar        Dates: Start: 11/03/22            Goal: STG-Within one week, patient will dress LB w/ set-up to  SBA       Dates: Start: 11/03/22               Problem: OT Long Term Goals       Dates: Start: 11/03/22         Goal: LTG-By discharge, patient will complete basic self care tasks w/ mod I       Dates: Start: 11/03/22            Goal: LTG-By discharge, patient will perform bathroom transfers w/ mod I       Dates: Start: 11/03/22            Goal: LTG-By discharge, patient will complete basic home management w/ mod I       Dates: Start: 11/03/22

## 2022-11-07 NOTE — CARE PLAN
Problem: Mobility  Goal: STG-Within one week, patient will ambulate household distance x 100 feet without AD and SBA  Outcome: Met     Problem: Mobility Transfers  Goal: STG-Within one week, patient will perform bed mobility using log roll method with SBA  Outcome: Met  Goal: STG-Within one week, patient will transfer bed to chair without AD and SPV  Outcome: Met

## 2022-11-07 NOTE — THERAPY
"Occupational Therapy  Daily Treatment     Patient Name: Nerissa Dennis  Age:  42 y.o., Sex:  female  Medical Record #: [id number]  Today's Date: 11/7/2022     Precautions  Precautions: Fall Risk, Posterior Hip Precautions, TLSO (Thoracolumbosacral orthosis), Cervical Collar  , Weight Bearing As Tolerated Left Lower Extremity  Comments: LSO OOB, cervical collar at all times, L ankle aircast         Subjective    \"Thank you that feels so good\"     Objective       11/07/22 1031   OT Charge Group   OT Manual Therapy Technique 2   OT Total Time Spent   OT Individual Total Time Spent (Mins) 30   Interdisciplinary Plan of Care Collaboration   Patient Position at End of Therapy Edge of Bed;Call Light within Reach;Tray Table within Reach     Pain mgmt with use of manual therapy including PROM, STM, trigger point release at B upper traps/periscapular and cervical regions, R shoulder/biceps/subscapularis. Gentle PROM: R shoulder circumduction, shoulder abduction to 90* with IR/ER    Assessment    Pt tolerated manual therapy techniques well, has significant tightness throughout UB. R upper trap with partial relief via trigger point release.     Strengths: Able to follow instructions, Alert and oriented, Effective communication skills, Good insight into deficits/needs, Independent prior level of function, Motivated for self care and independence, Pleasant and cooperative, Supportive family, Willingly participates in therapeutic activities  Barriers: Decreased endurance, Generalized weakness, Home accessibility, Impaired activity tolerance, Impaired balance, Limited mobility, Pain    Plan    Refer to Primary OT POC/goals     Occupational Therapy Goals (Active)       Problem: Dressing       Dates: Start: 11/03/22         Goal: STG-Within one week, patient will dress UB w/ set-up for shirt and min-mod A for c-collar        Dates: Start: 11/03/22            Goal: STG-Within one week, patient will dress LB w/ set-up to  SBA       Dates: " Start: 11/03/22               Problem: Functional Transfers       Dates: Start: 11/03/22         Goal: STG-Within one week, patient will transfer to toilet w/ consistent SBA       Dates: Start: 11/03/22            Goal: STG-Within one week, patient will transfer to step in shower w/ consistent SBA       Dates: Start: 11/03/22               Problem: OT Long Term Goals       Dates: Start: 11/03/22         Goal: LTG-By discharge, patient will complete basic self care tasks w/ mod I       Dates: Start: 11/03/22            Goal: LTG-By discharge, patient will perform bathroom transfers w/ mod I       Dates: Start: 11/03/22            Goal: LTG-By discharge, patient will complete basic home management w/ mod I       Dates: Start: 11/03/22

## 2022-11-07 NOTE — DISCHARGE PLANNING
CM  Attempted to meet w/ pt - in therapy.     [facility]; spoke with Michael who confirms Demarco delivered cervical collar earlier today; re LSO, [facility] was configuring appropriate brace   for her injury; he plans to come back to Rehab later today.

## 2022-11-07 NOTE — THERAPY
"Physical Therapy   Daily Treatment     Patient Name: Nerissa Dennis  Age:  42 y.o., Sex:  female  Medical Record #: [id number]  Today's Date: 11/7/2022     Precautions  Precautions: (P) Fall Risk, Posterior Hip Precautions, TLSO (Thoracolumbosacral orthosis), Cervical Collar  , Weight Bearing As Tolerated Left Lower Extremity  Comments: (P) LSO OOB, cervical collar at all times, L ankle aircast    Subjective    Pt reported that pain is tolerable. Pt wishes to continue without AD. Her biggest concern is her RUE.      Objective       11/07/22 0931   PT Charge Group   PT Neuromuscular Re-Education / Balance 1   PT Therapeutic Activities 1   PT Total Time Spent   PT Individual Total Time Spent (Mins) 30   Precautions   Precautions Fall Risk;Posterior Hip Precautions;TLSO (Thoracolumbosacral orthosis);Cervical Collar  ;Weight Bearing As Tolerated Left Lower Extremity   Comments LSO OOB, cervical collar at all times, L ankle aircast   Gait Functional Level of Assist    Gait Level Of Assist Supervised   Assistive Device None   Distance (Feet) 200   # of Times Distance was Traveled 1   Deviation Bradykinetic   Wheelchair Functional Level of Assist   Wheelchair Assist   (N/A- pt is ambulatory)   Stairs Functional Level of Assist   Level of Assist with Stairs Contact Guard Assist  (CGA no AD 8 6\" / steps/SBA 1 HR 8 6\")   # of Stairs Climbed 16   Stairs Description Extra time;Supervision for safety  (step to)   Transfer Functional Level of Assist   Bed, Chair, Wheelchair Transfer Independent   Bed Chair Wheelchair Transfer Description Other (comment)  (No AD)   Sitting Lower Body Exercises   Sit to Stand 1 set of 10  (Elevated mat table, no UE, slow and controlled)   Comments Scap squeezes 15x 2 sets   Bed Mobility    Sit to Stand Independent   Neuro-Muscular Treatments   Neuro-Muscular Treatments Weight Shift Left;Weight Shift Right;Verbal Cuing;Tactile Cuing;Sequencing   Interdisciplinary Plan of Care Collaboration   IDT " Collaboration with  Occupational Therapist;Family / Caregiver   Patient Position at End of Therapy Seated;Edge of Bed;Family / Friend in Room   Collaboration Comments Handoff from OT, POC, removal of heat pack   Strengths & Barriers   Strengths Able to follow instructions;Effective communication skills;Good carryover of learning;Good insight into deficits/needs;Independent prior level of function;Motivated for self care and independence;Pleasant and cooperative;Supportive family;Willingly participates in therapeutic activities   Barriers Decreased endurance;Fatigue;Home accessibility;Impaired activity tolerance;Limited mobility;Pain;Pain poorly managed       Assessment    Pt progressed to stairs without UE support, using step to pattern. Pt demonstrated inc indep with STS/ amb with no AD. Pt is ready for indep in-room assessment. Pt is highly motivated.     Strengths: (P) Able to follow instructions, Effective communication skills, Good carryover of learning, Good insight into deficits/needs, Independent prior level of function, Motivated for self care and independence, Pleasant and cooperative, Supportive family, Willingly participates in therapeutic activities  Barriers: (P) Decreased endurance, Fatigue, Home accessibility, Impaired activity tolerance, Limited mobility, Pain, Pain poorly managed    Plan    AAROM/ stretching as tolerated, progressive ambulation without device, general strength and endurance training. Repeat KWAN, and progress to indep in room.        Passport items to be completed:  Get in/out of bed safely, in/out of a vehicle, safely use mobility device, walk or wheel around home/community, navigate up and down stairs, show how to get up/down from the ground, ensure home is accessible, demonstrate HEP, complete caregiver training    Physical Therapy Problems (Active)       Problem: Mobility       Dates: Start: 11/07/22         Goal: STG-Within one week, patient will ambulate community distances  "500 ft indep over indoor surfaces, SPV over uneven surfaces       Dates: Start: 11/07/22            Goal: STG-Within one week, patient will ambulate up/down a 2 6\" steps with no AD SPV/indep       Dates: Start: 11/07/22               Problem: Mobility Transfers       Dates: Start: 11/07/22            Problem: PT-Long Term Goals       Dates: Start: 11/03/22         Goal: LTG-By discharge, patient will ambulate x 150 feet without AD and mod I for extra time       Dates: Start: 11/03/22            Goal: LTG-By discharge, patient will transfer one surface to another independent no AD       Dates: Start: 11/03/22            Goal: LTG-By discharge, patient will transfer in/out of a car without AD and SPV       Dates: Start: 11/03/22              "

## 2022-11-07 NOTE — THERAPY
Physical Therapy   Daily Treatment     Patient Name: Nerissa Dennis  Age:  42 y.o., Sex:  female  Medical Record #: [id number]  Today's Date: 11/7/2022     Precautions  Precautions: (P) Fall Risk, Posterior Hip Precautions, TLSO (Thoracolumbosacral orthosis), Cervical Collar  , Weight Bearing As Tolerated Left Lower Extremity  Comments: (P) LSO OOB, cervical collar at all times, L ankle aircast    Subjective    Pt and spouse felt encouraged by improving physical functioning, and improving pain management. Pt admits to generalized soreness, brenda at L ankle, but it remains well managed overall.       Objective       11/07/22 1331   PT Charge Group   PT Neuromuscular Re-Education / Balance 2   PT Therapeutic Activities 2   PT Total Time Spent   PT Individual Total Time Spent (Mins) 60   Precautions   Precautions Fall Risk;Posterior Hip Precautions;TLSO (Thoracolumbosacral orthosis);Cervical Collar  ;Weight Bearing As Tolerated Left Lower Extremity   Comments LSO OOB, cervical collar at all times, L ankle aircast   Gait Functional Level of Assist    Gait Level Of Assist Independent  (Able to pathfind to lobby, cafeteria, room)   Assistive Device None  (L ankle air splint)   Distance (Feet) 600  (+ 250)   # of Times Distance was Traveled 1   Deviation Bradykinetic   Transfer Functional Level of Assist   Bed, Chair, Wheelchair Transfer Independent   Bed Mobility    Sit to Stand Independent   Neuro-Muscular Treatments   Neuro-Muscular Treatments Weight Shift Left;Weight Shift Right;Verbal Cuing;Sequencing   Comments Education on heat vs ice, and ice application protocol.   Outcome Measures   Outcome Measures Utilized Yee Balance Scale   Yee Balance Scale   Sitting Unsupported (Score 0-4) 4   Change Of Positon: Sitting To Standing (Score 0-4) 4   Change Of Positon: Standing To Sitting (Score 0-4) 4   Transfers (Score 0-4) 4   Standing Unsupported (Score 0-4) 4   Standing With Eyes Closed (Score 0-4) 4   Standing With Feet  Together (Score 0-4) 4   Tandem Standing (Score 0-4) 3  (Avoided tandem position d/t hip precautions, staggered only)   Standing On One Leg (Score 0-4) 2   Turning Trunk (Feet Fixed) (Score 0-4) 4  (modified for spinal safety (no twisting), turning whole body to peer behind both directions)   Retrieving Objects From Floor (Score 0-4) 4  (Use of reacher for spinal and hip safety- modifed)   Turning 360 Degrees (Score 0-4) 2   Stool Stepping (Score 0-4) 2   Reaching Forward While Standing (Score 0-4) 4   Kwan Balance Total Score (0-56) 49   Interdisciplinary Plan of Care Collaboration   IDT Collaboration with  Family / Caregiver;Nursing   Patient Position at End of Therapy Family / Friend in Room;Other (Comments)  (handoff to OT, POC)   Collaboration Comments POC, orange wristband provided; IDT in agreement   Strengths & Barriers   Strengths Able to follow instructions;Effective communication skills;Good carryover of learning;Good insight into deficits/needs;Independent prior level of function;Motivated for self care and independence;Pleasant and cooperative;Supportive family;Willingly participates in therapeutic activities   Barriers Decreased endurance;Fatigue;Home accessibility;Impaired activity tolerance;Limited mobility;Pain;Pain poorly managed     Demo of spinal precautions/ body mechanics. Review on timeline and recommendations for precautions maintenance.     Assessment    Pt scored 49/56 on KWAN Balance Scale, which represents true clinical change since her original score of 38/56. Modifications were made to ensure compliance with hip precautions, and spinal precautions. Pt was able to path find around facility. Pt was progressed for indep at wc level in room, to indep at ambulatory level in room, and on unit, and provided with orange wristband. IDT in agreement.      Strengths: (P) Able to follow instructions, Effective communication skills, Good carryover of learning, Good insight into deficits/needs,  "Independent prior level of function, Motivated for self care and independence, Pleasant and cooperative, Supportive family, Willingly participates in therapeutic activities  Barriers: (P) Decreased endurance, Fatigue, Home accessibility, Impaired activity tolerance, Limited mobility, Pain, Pain poorly managed    Plan    Ongoing review of posterior hip precautions, spinal precautions, trial car transfer (weather permitting), cardiovascular/ activity tolerance. Passport. Dec PT to 60 min/ OT to inc to 120 min.      Passport items to be completed:  Get in/out of bed safely, in/out of a vehicle, safely use mobility device, walk or wheel around home/community, navigate up and down stairs, show how to get up/down from the ground, ensure home is accessible, demonstrate HEP, complete caregiver training    Physical Therapy Problems (Active)       Problem: Mobility       Dates: Start: 11/07/22         Goal: STG-Within one week, patient will ambulate community distances 500 ft indep over indoor surfaces, SPV over uneven surfaces       Dates: Start: 11/07/22            Goal: STG-Within one week, patient will ambulate up/down a 2 6\" steps with no AD SPV/indep       Dates: Start: 11/07/22               Problem: Mobility Transfers       Dates: Start: 11/07/22            Problem: PT-Long Term Goals       Dates: Start: 11/03/22         Goal: LTG-By discharge, patient will ambulate x 150 feet without AD and mod I for extra time       Dates: Start: 11/03/22            Goal: LTG-By discharge, patient will transfer one surface to another independent no AD       Dates: Start: 11/03/22            Goal: LTG-By discharge, patient will transfer in/out of a car without AD and SPV       Dates: Start: 11/03/22              "

## 2022-11-08 PROCEDURE — 700102 HCHG RX REV CODE 250 W/ 637 OVERRIDE(OP): Performed by: PHYSICAL MEDICINE & REHABILITATION

## 2022-11-08 PROCEDURE — A9270 NON-COVERED ITEM OR SERVICE: HCPCS | Performed by: PHYSICAL MEDICINE & REHABILITATION

## 2022-11-08 PROCEDURE — 770010 HCHG ROOM/CARE - REHAB SEMI PRIVAT*

## 2022-11-08 PROCEDURE — 97530 THERAPEUTIC ACTIVITIES: CPT

## 2022-11-08 PROCEDURE — 700111 HCHG RX REV CODE 636 W/ 250 OVERRIDE (IP): Performed by: PHYSICAL MEDICINE & REHABILITATION

## 2022-11-08 PROCEDURE — 99232 SBSQ HOSP IP/OBS MODERATE 35: CPT | Performed by: PHYSICAL MEDICINE & REHABILITATION

## 2022-11-08 PROCEDURE — 97110 THERAPEUTIC EXERCISES: CPT

## 2022-11-08 PROCEDURE — 700101 HCHG RX REV CODE 250: Performed by: PHYSICAL MEDICINE & REHABILITATION

## 2022-11-08 PROCEDURE — 97032 APPL MODALITY 1+ESTIM EA 15: CPT

## 2022-11-08 PROCEDURE — 97140 MANUAL THERAPY 1/> REGIONS: CPT

## 2022-11-08 PROCEDURE — 97112 NEUROMUSCULAR REEDUCATION: CPT

## 2022-11-08 RX ADMIN — Medication 1 TABLET: at 11:23

## 2022-11-08 RX ADMIN — GABAPENTIN 900 MG: 300 CAPSULE ORAL at 05:19

## 2022-11-08 RX ADMIN — METAXALONE 800 MG: 800 TABLET ORAL at 20:04

## 2022-11-08 RX ADMIN — POLYETHYLENE GLYCOL 3350 1 PACKET: 17 POWDER, FOR SOLUTION ORAL at 07:44

## 2022-11-08 RX ADMIN — GABAPENTIN 900 MG: 300 CAPSULE ORAL at 20:04

## 2022-11-08 RX ADMIN — ENOXAPARIN SODIUM 30 MG: 30 INJECTION SUBCUTANEOUS at 20:05

## 2022-11-08 RX ADMIN — DULOXETINE HYDROCHLORIDE 30 MG: 30 CAPSULE, DELAYED RELEASE ORAL at 07:44

## 2022-11-08 RX ADMIN — ENOXAPARIN SODIUM 30 MG: 30 INJECTION SUBCUTANEOUS at 07:44

## 2022-11-08 RX ADMIN — Medication 6 MG: at 20:04

## 2022-11-08 RX ADMIN — GABAPENTIN 900 MG: 300 CAPSULE ORAL at 15:45

## 2022-11-08 RX ADMIN — ACETAMINOPHEN 500 MG: 500 TABLET ORAL at 07:44

## 2022-11-08 RX ADMIN — OXYCODONE HYDROCHLORIDE 10 MG: 10 TABLET ORAL at 01:00

## 2022-11-08 RX ADMIN — ACETAMINOPHEN 500 MG: 500 TABLET ORAL at 20:04

## 2022-11-08 RX ADMIN — OXYCODONE HYDROCHLORIDE 10 MG: 10 TABLET ORAL at 20:04

## 2022-11-08 RX ADMIN — MAGNESIUM HYDROXIDE 30 ML: 1200 LIQUID ORAL at 17:13

## 2022-11-08 RX ADMIN — LIDOCAINE 3 PATCH: 50 PATCH CUTANEOUS at 07:46

## 2022-11-08 RX ADMIN — METAXALONE 800 MG: 800 TABLET ORAL at 15:45

## 2022-11-08 RX ADMIN — ACETAMINOPHEN 500 MG: 500 TABLET ORAL at 15:45

## 2022-11-08 RX ADMIN — OXYCODONE HYDROCHLORIDE 10 MG: 10 TABLET ORAL at 05:21

## 2022-11-08 RX ADMIN — METAXALONE 800 MG: 800 TABLET ORAL at 07:44

## 2022-11-08 RX ADMIN — OXYCODONE HYDROCHLORIDE 10 MG: 10 TABLET ORAL at 09:25

## 2022-11-08 RX ADMIN — OMEPRAZOLE 20 MG: 20 CAPSULE, DELAYED RELEASE ORAL at 07:44

## 2022-11-08 RX ADMIN — OXYCODONE HYDROCHLORIDE 10 MG: 10 TABLET ORAL at 15:45

## 2022-11-08 NOTE — CARE PLAN
Problem: Pain - Standard  Goal: Alleviation of pain or a reduction in pain to the patient’s comfort goal  Note: Pain is manageable with scheduled medication at this time.Will continue to monitor and assess pain level and medicate as needed.     Problem: Fall Risk - Rehab  Goal: Patient will remain free from falls  Note: Julia Aguero Fall risk Assessment Score: 7    Low fall risk interventions   - Call light within reach   - Yellow  socks   - Belongings within reach   - Bed in the lowest position

## 2022-11-08 NOTE — THERAPY
Occupational Therapy  Daily Treatment     Patient Name: Nerissa Dennis  Age:  42 y.o., Sex:  female  Medical Record #: [id number]  Today's Date: 11/8/2022     Precautions  Precautions: (P) Posterior Hip Precautions, TLSO (Thoracolumbosacral orthosis), Lumbosacral Corset, Weight Bearing As Tolerated Left Lower Extremity  Comments: (P) non-surgical, Dane collar for showers, L ankle aircast    Subjective    Patient seated EOB upon arrival w/ spouse bedside. Agreeable to participate in OT.      Objective     11/08/22 0931   OT Charge Group   OT Electrical Stimulation Attended 1   OT Neuromuscular Re-education / Balance 4   OT Therapeutic Exercise  1   OT Total Time Spent   OT Individual Total Time Spent (Mins) 90   Precautions   Precautions Posterior Hip Precautions;TLSO (Thoracolumbosacral orthosis);Lumbosacral Corset;Weight Bearing As Tolerated Left Lower Extremity   Comments non-surgical, Dane collar for showers, L ankle aircast   Cognition    Level of Consciousness Alert   Sleep/Wake Cycle   Sleep & Rest Awake   Interdisciplinary Plan of Care Collaboration   Patient Position at End of Therapy Seated     Pt set up on RT-300 FES for RUE neuro re-ed (peripheral nerve injury), ROM and sensory input.  Electrodes applied to R scap stabilizers, anterior deltoid, biceps and  triceps. Muscle stimulation parameters assessed for each muscle group to pt tolerance to e-stim and muscle contraction observed.  Pt tolerated 20 min 1 sec of cycling, including warm up and cool down periods with results as follows: distance travelled: 1.65 miles, energy expended: 0.1 kCal, energy per hour: 0.4 kCal/hr, avg power: 0.4 W.  Cycled using RUE only. Pt tolerated well with no c/o pain.     Towel slides using BUEs; tabletop; 2 sets x 10 shoulder flexion/extension, shoulder circles (clockwise and counter clockwise).    Vibration applied to RUE for desensitization (R levator scapulae, upper traps, anterior/mid/post deltoid, biceps,  triceps and briefly along dorsal aspect of forearm).     Assessment    Patient tolerated OT session well with focus on progression w/ RUE desensitization, neuro re-ed and overall function. Patient tolerated initial RT-300 trial well w/ no c/o pain. Patient cont to demo consistent progress w/ proximal RUE motor return, R shoulder flexion AROM now ~45 degrees. Remains highly motivated and participatory.   Strengths: Able to follow instructions, Alert and oriented, Effective communication skills, Good insight into deficits/needs, Independent prior level of function, Motivated for self care and independence, Pleasant and cooperative, Supportive family, Willingly participates in therapeutic activities  Barriers: Decreased endurance, Generalized weakness, Home accessibility, Impaired activity tolerance, Impaired balance, Limited mobility, Pain    Plan    Progression with RUE ROM, sensory re-education/function (for brachial plexus injury).      Occupational Therapy Goals (Active)       Problem: Dressing       Dates: Start: 11/03/22         Goal: STG-Within one week, patient will dress UB w/ set-up for shirt and min-mod A for c-collar        Dates: Start: 11/03/22            Goal: STG-Within one week, patient will dress LB w/ set-up to  SBA       Dates: Start: 11/03/22               Problem: OT Long Term Goals       Dates: Start: 11/03/22         Goal: LTG-By discharge, patient will complete basic self care tasks w/ mod I       Dates: Start: 11/03/22            Goal: LTG-By discharge, patient will perform bathroom transfers w/ mod I       Dates: Start: 11/03/22            Goal: LTG-By discharge, patient will complete basic home management w/ mod I       Dates: Start: 11/03/22

## 2022-11-08 NOTE — PROGRESS NOTES
"REHABILITATION PROGRESS NOTE    Date of Admission: 11/2/2022    [facility] Code / Diagnosis to Support: 0014.2 - Major Multiple Trauma: Brain + Multiple Fracture/Amputation  Etiologic Diagnosis: Trauma    SUBJECTIVE  Patient seen in room resting in bed  GI: continent, last BM 11/6  : continent  Psych: slept well last night  MSK: pain improving - patient reporting she can tell the increased gabapentin is helping her arm pain. She also reports ongoing improvement in her functional use of her right arm.    I have performed a physical exam and reviewed and updated history and plan today (11/8/2022).     MEDICATIONS:  Current Facility-Administered Medications   Medication Dose    gabapentin (NEURONTIN) capsule 900 mg  900 mg    DULoxetine (CYMBALTA) capsule 30 mg  30 mg    melatonin tablet 6 mg  6 mg    acetaminophen (TYLENOL) tablet 500 mg  500 mg    vitamin D2 (Ergocalciferol) (Drisdol) capsule 50,000 Units  50,000 Units    Respiratory Therapy Consult      Pharmacy Consult Request ...Pain Management Review 1 Each  1 Each    omeprazole (PRILOSEC) capsule 20 mg  20 mg    mag hydrox-al hydrox-simeth (MAALOX PLUS ES or MYLANTA DS) suspension 20 mL  20 mL    ondansetron (ZOFRAN ODT) dispertab 4 mg  4 mg    Or    ondansetron (ZOFRAN) syringe/vial injection 4 mg  4 mg    sodium chloride (OCEAN) 0.65 % nasal spray 2 Spray  2 Spray    therapeutic multivitamin-minerals (THERAGRAN-M) tablet 1 Tablet  1 Tablet    acetaminophen (TYLENOL) tablet 500 mg  500 mg    magnesium hydroxide (MILK OF MAGNESIA) suspension 30 mL  30 mL    polyethylene glycol/lytes (MIRALAX) PACKET 1 Packet  1 Packet    enoxaparin (Lovenox) inj 30 mg  30 mg    lidocaine (LIDODERM) 5 % 1-3 Patch  1-3 Patch    metaxalone (Skelaxin) tablet 800 mg  800 mg    oxyCODONE immediate-release (ROXICODONE) tablet 5 mg  5 mg    Or    oxyCODONE immediate release (ROXICODONE) tablet 10 mg  10 mg       PHYSICAL EXAM:     VITAL SIGNS:   height is 1.6 m (5' 2.99\") and weight is 70 kg " (154 lb 5.2 oz). Her oral temperature is 36.6 °C (97.9 °F). Her blood pressure is 120/70 and her pulse is 71. Her respiration is 18 and oxygen saturation is 97%.     General: well-groomed in no acute distress, resting in bed, no visitor present  Eyes: no scleral icterus or conjunctival injection  Ears, nose, mouth and throat: moist oral mucosa  Cardiovascular: good peripheral perfusion, no pallor  Respiratory: breathing comfortably without use of accessory muscles  Gastrointestinal: nondistended  Genitourinary: no indwelling ebck  Skin: no wounds seen on exposed skin    Neurologic:  Mental status:  A&Ox4 (person, place, date, situation) answers questions appropriately follows commands  Speech: fluent, no aphasia or dysarthria  Good trunk support unassisted sitting in wheelchair  Tone: no spasticity noted  Psychiatric: appropriate affect  Hematologic/lymphatic/immunologic: no IV access    LABS:  Recent Labs     11/07/22  0533   SODIUM 138   POTASSIUM 3.5*   CHLORIDE 99   CO2 29   GLUCOSE 71   BUN 16   CREATININE 0.66   CALCIUM 9.0     Recent Labs     11/07/22  0533   WBC 7.3   RBC 3.72*   HEMOGLOBIN 12.3   HEMATOCRIT 37.9   .9*   MCH 33.1*   MCHC 32.5*   RDW 48.0   PLATELETCT 506*   MPV 8.6*             PRIMARY REHAB DIAGNOSIS:    This patient is a 42 y.o. female admitted for acute inpatient rehabilitation with   Trauma.    Medical management / Rehabilitation Issues/ Adverse Potential as part of rehabilitation plan     REHABILITATION ISSUES/ADVERSE POTENTIAL and MEDICAL CO-MORBIDITIES/ADVERSE POTENTIAL AFFECTING FUNCTION:    ##MSK  #Impaired ADLs and mobility  Patient is admitted to [facility] for comprehensive rehabilitation therapy as described.   Rehabilitation nursing monitors bowel and bladder control, educates on medication administration, co-morbidities and monitors patient safety.  Anticipated discharge date: 11/16/2022  Anticipated discharge destination: home with  family  Prognosis: good  Equipment: TBD  Postdischarge therapies: TBD  Followup: ortho (Ministerio Watts MD), Neuro ([facility]), PCP, neurosurgery  Precautions: Weight bearing as tolerated in left lower limb. LSO when out of bed (don at edge of bed). C collar at all times. posterior hip precautions  Code: full resuscitation    #Posttraumatic pain, acute, controlled  #Neuropathic pain, acute, uncontrolled  Continue lidoderm patch 5% 1-3 patches daily, skelaxin 800mg TID, oxycodone 5-10mg Q3hr PRN pain, cymbalta 30mg daily, gabapentin 900mg TID    #Polytrauma  fracture of the distal fibula  C6 spinous process fracture  Right T12, L1, and L2 transverse process fractures  Left L4 superior facet fracture  Fracture through the superolateral L4 superior endplate  Posterior left 10th and 11th rib fractures. Right posterior third rib fracture is seen. Right posterior 12th rib fracture at the costovertebral junction  Bilateral nasal bone fractures  Left hip dislocation  Right shoulder with Edema involving the soft tissues surrounding the scapular body possibly representing nondisplaced scapular body fracture; Strain/contusion of the trapezius, deltoid, supraspinatus, infraspinatus, subscapularis and teres minor muscles.  MRI Cspine interspinous lig stretch injury no overt Fx aside form C6 sp, no sign of nerve root injury on scan     ##NEURO  #Acute moderate traumatic brain injury  3.3 mm left posterior subdural hematoma, bilateral nasal bone fracture  LOC less than five minutes per documentation  keppra 500mg BID for seizure prophylaxis completed on 11/5  SLP    #Possible right brachial plexus injury  Ordered dexamethasone taper  EMG followup if not improving   Monitor    #RESP  #Incidental pulmonary nodule  Patient works in [employer]  High risk - per radiology, optional CT in 12 months    Respiratory therapy: RT performs O2 management prn, breathing retraining, pulmonary hygiene and bronchospasm management prn to optimize participation  in therapies.     ##CARDIAC  DVT prophylaxis: Continue lovenox 30mg BID. Encourage OOB. Monitor daily for signs and symptoms of DVT including but not limited to swelling and pain to prevent the development of DVT that may interfere with therapies.    ##GI  GI prophylaxis:  Continue prilosec 20mg daily to prevent gastritis/dyspepsia which may interfere with therapies.  #At risk of opioid induced constipation  Goal of one continent BM daily  Continue miralax 17g daily    #At high risk of malnutrition  Dietician monitors nutrient intake, recommend supplements prn and provide nutrition education to pt/family to promote optimal nutrition for wound healing/recovery.   Orders Placed This Encounter   Procedures    Diet Order Diet: Regular     Standing Status:   Standing     Number of Occurrences:   1     Order Specific Question:   Diet:     Answer:   Regular [1]   Continue multivitamin daily  prealbumin levels 28.5    #Elevated liver enzymes  AST 98,   Monitor    ##HEME/ENDO  #Anemia  Monitor    #Vitamin D deficiency  Vit D 25 OH 12  Continue ergocalciferol 50,000 units weekly    ##SKIN  Skin/dermal ulcer prophylaxis: Monitor for new skin conditions with q.2 h. turns as required to prevent the development of skin breakdown.     Kandi Ramirez, DO  Physical Medicine and Rehabilitation

## 2022-11-08 NOTE — DISCHARGE PLANNING
CM  Met with pt / spouse Kamran; they report they prefer to dc on Friday and spoke with MD about dc date.   They have info on follow up appts w/ Neurosurgeon and Ortho.   Shower chair to be ordered.   Out patient OT @ [facility].   Spouse to obtain fmla forms; will assist.   Pharmacy - [facility] uncertain as to which pharmacy @ this time.

## 2022-11-08 NOTE — CARE PLAN
The patient is Watcher - Medium risk of patient condition declining or worsening    Shift Goals  Clinical Goals: pain control    Problem: Pain - Standard  Goal: Alleviation of pain or a reduction in pain to the patient’s comfort goal  Note: Patient is having generalized 7/10 pain, PRN oxy has been given, will continue to monitor.      Problem: Skin Integrity  Goal: Patient's skin integrity will be maintained or improve  Note: Bruising and slight swelling to LLE, ankle brace in place, will continue to monitor.

## 2022-11-08 NOTE — THERAPY
Physical Therapy   Daily Treatment     Patient Name: Nerissa Dennis  Age:  42 y.o., Sex:  female  Medical Record #: [id number]  Today's Date: 11/8/2022     Precautions  Precautions: Fall Risk, Posterior Hip Precautions, TLSO (Thoracolumbosacral orthosis), Lumbosacral Corset, Weight Bearing As Tolerated Left Lower Extremity  Comments: LSO OOB, cervical collar at all times, L ankle aircast    Subjective    Pt was standing in room upon arrival and agreeable to treatment.  Pt's spouse present during session.  Pt reported continued significant pain.      Objective       11/08/22 0831   PT Charge Group   PT Therapeutic Activities 2   PT Total Time Spent   PT Individual Total Time Spent (Mins) 30   Precautions   Precautions Fall Risk;Posterior Hip Precautions;TLSO (Thoracolumbosacral orthosis);Lumbosacral Corset;Weight Bearing As Tolerated Left Lower Extremity   Interdisciplinary Plan of Care Collaboration   Patient Position at End of Therapy Seated;Edge of Bed;Call Light within Reach;Tray Table within Reach;Phone within Reach;Family / Friend in Room     Trialed use of TENS unit to R upper quadrant x 10 min with good results.  Pt educated on TENS rationale and indications.     Assessment    Pt with good response to use of TENS, but would benefit from small electrodes when available.  Pt verbalized all education.    Strengths: Able to follow instructions, Effective communication skills, Good carryover of learning, Good insight into deficits/needs, Independent prior level of function, Motivated for self care and independence, Pleasant and cooperative, Supportive family, Willingly participates in therapeutic activities  Barriers: Decreased endurance, Fatigue, Home accessibility, Impaired activity tolerance, Limited mobility, Pain, Pain poorly managed    Plan    Ongoing review of posterior hip precautions, spinal precautions, trial car transfer (weather permitting), cardiovascular/ activity tolerance. Passport. Dec PT to 60 min/  "OT to inc to 120 min.       Passport items to be completed:  Get in/out of bed safely, in/out of a vehicle, safely use mobility device, walk or wheel around home/community, navigate up and down stairs, show how to get up/down from the ground, ensure home is accessible, demonstrate HEP, complete caregiver training       Physical Therapy Problems (Active)       Problem: Mobility       Dates: Start: 11/07/22         Goal: STG-Within one week, patient will ambulate community distances 500 ft indep over indoor surfaces, SPV over uneven surfaces       Dates: Start: 11/07/22            Goal: STG-Within one week, patient will ambulate up/down a 2 6\" steps with no AD SPV/indep       Dates: Start: 11/07/22               Problem: Mobility Transfers       Dates: Start: 11/07/22            Problem: PT-Long Term Goals       Dates: Start: 11/03/22         Goal: LTG-By discharge, patient will ambulate x 150 feet without AD and mod I for extra time       Dates: Start: 11/03/22            Goal: LTG-By discharge, patient will transfer one surface to another independent no AD       Dates: Start: 11/03/22            Goal: LTG-By discharge, patient will transfer in/out of a car without AD and SPV       Dates: Start: 11/03/22              "

## 2022-11-08 NOTE — DISCHARGE PLANNING
Case Managament   Pt has the following appts:   1.  Dr Schreiber Neurosurgeon    [address] [id number]  Tu 11/15 @ 1:15pm with a 1:00pm check in     2.  Dr Ministerio Watts Ortho    [address] [facility] [place]  [address]  Wed 11/16 @ 11:15am with a 10:45am check in

## 2022-11-09 LAB
ALBUMIN SERPL BCP-MCNC: 4.2 G/DL (ref 3.2–4.9)
ALP SERPL-CCNC: 96 U/L (ref 30–99)
ALT SERPL-CCNC: 93 U/L (ref 2–50)
ANION GAP SERPL CALC-SCNC: 7 MMOL/L (ref 7–16)
AST SERPL-CCNC: 40 U/L (ref 12–45)
BASOPHILS # BLD AUTO: 0.7 % (ref 0–1.8)
BASOPHILS # BLD: 0.04 K/UL (ref 0–0.12)
BILIRUB CONJ SERPL-MCNC: <0.2 MG/DL (ref 0.1–0.5)
BILIRUB INDIRECT SERPL-MCNC: ABNORMAL MG/DL (ref 0–1)
BILIRUB SERPL-MCNC: 0.3 MG/DL (ref 0.1–1.5)
BUN SERPL-MCNC: 12 MG/DL (ref 8–22)
CALCIUM SERPL-MCNC: 9 MG/DL (ref 8.5–10.5)
CHLORIDE SERPL-SCNC: 100 MMOL/L (ref 96–112)
CO2 SERPL-SCNC: 30 MMOL/L (ref 20–33)
CREAT SERPL-MCNC: 0.62 MG/DL (ref 0.5–1.4)
EOSINOPHIL # BLD AUTO: 0.3 K/UL (ref 0–0.51)
EOSINOPHIL NFR BLD: 4.9 % (ref 0–6.9)
ERYTHROCYTE [DISTWIDTH] IN BLOOD BY AUTOMATED COUNT: 50.4 FL (ref 35.9–50)
GFR SERPLBLD CREATININE-BSD FMLA CKD-EPI: 114 ML/MIN/1.73 M 2
GLUCOSE SERPL-MCNC: 87 MG/DL (ref 65–99)
HCT VFR BLD AUTO: 39.2 % (ref 37–47)
HGB BLD-MCNC: 12.5 G/DL (ref 12–16)
IMM GRANULOCYTES # BLD AUTO: 0.05 K/UL (ref 0–0.11)
IMM GRANULOCYTES NFR BLD AUTO: 0.8 % (ref 0–0.9)
LYMPHOCYTES # BLD AUTO: 1.92 K/UL (ref 1–4.8)
LYMPHOCYTES NFR BLD: 31.3 % (ref 22–41)
MCH RBC QN AUTO: 32.9 PG (ref 27–33)
MCHC RBC AUTO-ENTMCNC: 31.9 G/DL (ref 33.6–35)
MCV RBC AUTO: 103.2 FL (ref 81.4–97.8)
MONOCYTES # BLD AUTO: 0.53 K/UL (ref 0–0.85)
MONOCYTES NFR BLD AUTO: 8.6 % (ref 0–13.4)
NEUTROPHILS # BLD AUTO: 3.29 K/UL (ref 2–7.15)
NEUTROPHILS NFR BLD: 53.7 % (ref 44–72)
NRBC # BLD AUTO: 0 K/UL
NRBC BLD-RTO: 0 /100 WBC
PLATELET # BLD AUTO: 500 K/UL (ref 164–446)
PMV BLD AUTO: 8.4 FL (ref 9–12.9)
POTASSIUM SERPL-SCNC: 4.2 MMOL/L (ref 3.6–5.5)
PROT SERPL-MCNC: 6.1 G/DL (ref 6–8.2)
RBC # BLD AUTO: 3.8 M/UL (ref 4.2–5.4)
SODIUM SERPL-SCNC: 137 MMOL/L (ref 135–145)
WBC # BLD AUTO: 6.1 K/UL (ref 4.8–10.8)

## 2022-11-09 PROCEDURE — 97032 APPL MODALITY 1+ESTIM EA 15: CPT

## 2022-11-09 PROCEDURE — 700111 HCHG RX REV CODE 636 W/ 250 OVERRIDE (IP): Performed by: PHYSICAL MEDICINE & REHABILITATION

## 2022-11-09 PROCEDURE — 97530 THERAPEUTIC ACTIVITIES: CPT

## 2022-11-09 PROCEDURE — A9270 NON-COVERED ITEM OR SERVICE: HCPCS | Performed by: PHYSICAL MEDICINE & REHABILITATION

## 2022-11-09 PROCEDURE — 36415 COLL VENOUS BLD VENIPUNCTURE: CPT

## 2022-11-09 PROCEDURE — 80048 BASIC METABOLIC PNL TOTAL CA: CPT

## 2022-11-09 PROCEDURE — 700102 HCHG RX REV CODE 250 W/ 637 OVERRIDE(OP): Performed by: PHYSICAL MEDICINE & REHABILITATION

## 2022-11-09 PROCEDURE — 97112 NEUROMUSCULAR REEDUCATION: CPT

## 2022-11-09 PROCEDURE — 85025 COMPLETE CBC W/AUTO DIFF WBC: CPT

## 2022-11-09 PROCEDURE — 99232 SBSQ HOSP IP/OBS MODERATE 35: CPT | Performed by: PHYSICAL MEDICINE & REHABILITATION

## 2022-11-09 PROCEDURE — 770010 HCHG ROOM/CARE - REHAB SEMI PRIVAT*

## 2022-11-09 PROCEDURE — 80076 HEPATIC FUNCTION PANEL: CPT

## 2022-11-09 PROCEDURE — 97110 THERAPEUTIC EXERCISES: CPT

## 2022-11-09 RX ORDER — POLYETHYLENE GLYCOL 3350 17 G/17G
1 POWDER, FOR SOLUTION ORAL 2 TIMES DAILY
Status: DISCONTINUED | OUTPATIENT
Start: 2022-11-09 | End: 2022-11-11 | Stop reason: HOSPADM

## 2022-11-09 RX ADMIN — OXYCODONE HYDROCHLORIDE 10 MG: 10 TABLET ORAL at 08:46

## 2022-11-09 RX ADMIN — ENOXAPARIN SODIUM 30 MG: 30 INJECTION SUBCUTANEOUS at 07:56

## 2022-11-09 RX ADMIN — ACETAMINOPHEN 500 MG: 500 TABLET ORAL at 20:27

## 2022-11-09 RX ADMIN — OMEPRAZOLE 20 MG: 20 CAPSULE, DELAYED RELEASE ORAL at 07:57

## 2022-11-09 RX ADMIN — DULOXETINE HYDROCHLORIDE 30 MG: 30 CAPSULE, DELAYED RELEASE ORAL at 07:58

## 2022-11-09 RX ADMIN — ACETAMINOPHEN 500 MG: 500 TABLET ORAL at 07:58

## 2022-11-09 RX ADMIN — POLYETHYLENE GLYCOL 3350 1 PACKET: 17 POWDER, FOR SOLUTION ORAL at 20:27

## 2022-11-09 RX ADMIN — METAXALONE 800 MG: 800 TABLET ORAL at 07:57

## 2022-11-09 RX ADMIN — ACETAMINOPHEN 500 MG: 500 TABLET ORAL at 14:31

## 2022-11-09 RX ADMIN — OXYCODONE HYDROCHLORIDE 10 MG: 10 TABLET ORAL at 20:27

## 2022-11-09 RX ADMIN — ENOXAPARIN SODIUM 30 MG: 30 INJECTION SUBCUTANEOUS at 20:31

## 2022-11-09 RX ADMIN — OXYCODONE HYDROCHLORIDE 10 MG: 10 TABLET ORAL at 14:30

## 2022-11-09 RX ADMIN — METAXALONE 800 MG: 800 TABLET ORAL at 14:32

## 2022-11-09 RX ADMIN — METAXALONE 800 MG: 800 TABLET ORAL at 20:27

## 2022-11-09 RX ADMIN — MAGNESIUM HYDROXIDE 30 ML: 1200 LIQUID ORAL at 05:16

## 2022-11-09 RX ADMIN — Medication 1 TABLET: at 10:57

## 2022-11-09 RX ADMIN — Medication 6 MG: at 20:27

## 2022-11-09 RX ADMIN — GABAPENTIN 900 MG: 300 CAPSULE ORAL at 05:22

## 2022-11-09 RX ADMIN — GABAPENTIN 900 MG: 300 CAPSULE ORAL at 14:32

## 2022-11-09 RX ADMIN — GABAPENTIN 900 MG: 300 CAPSULE ORAL at 20:26

## 2022-11-09 RX ADMIN — OXYCODONE HYDROCHLORIDE 10 MG: 10 TABLET ORAL at 05:22

## 2022-11-09 RX ADMIN — POLYETHYLENE GLYCOL 3350 1 PACKET: 17 POWDER, FOR SOLUTION ORAL at 07:56

## 2022-11-09 RX ADMIN — MAGNESIUM HYDROXIDE 30 ML: 1200 LIQUID ORAL at 18:17

## 2022-11-09 ASSESSMENT — GAIT ASSESSMENTS
DISTANCE (FEET): 300
GAIT LEVEL OF ASSIST: STANDBY ASSIST
GAIT LEVEL OF ASSIST: INDEPENDENT
DISTANCE (FEET): 500
DEVIATION: BRADYKINETIC
DEVIATION: BRADYKINETIC

## 2022-11-09 ASSESSMENT — PAIN SCALES - WONG BAKER
WONGBAKER_NUMERICALRESPONSE: HURTS JUST A LITTLE BIT
WONGBAKER_NUMERICALRESPONSE: HURTS JUST A LITTLE BIT

## 2022-11-09 NOTE — PROGRESS NOTES
Patient care assumed. Report received from [facility] IVORY Alan. Patient is alert and calm, resting in bed. Call light and bedside table within reach. Will continue to monitor.

## 2022-11-09 NOTE — PROGRESS NOTES
REHABILITATION PROGRESS NOTE    Date of Admission: 11/2/2022    [facility] Code / Diagnosis to Support: 0014.2 - Major Multiple Trauma: Brain + Multiple Fracture/Amputation  Etiologic Diagnosis: Trauma    SUBJECTIVE  Patient seen in room. Worker's comp  arrived toward the end of my meeting with patient  GI: continent, last BM 11/6 - increase miralax  : continent  Psych: sleep not as good as the night before, she reports she sleeps about four hours and then has difficulty staying asleep, she does not want anything for it at this time  MSK: pain doing better, feeling tight in the morning but after some therapies it feels better. The nerve pain in right arm is well controlled with current gabapentin dose and she does not want this increased today    Patient reporting she would like to leave earlier. Had discussion with her in the presence of the workers' comp . We were all in agreement to leave on 11/11 as she is medically safe to be home.    I have performed a physical exam and reviewed and updated history and plan today (11/9/2022).     MEDICATIONS:  Current Facility-Administered Medications   Medication Dose    gabapentin (NEURONTIN) capsule 900 mg  900 mg    DULoxetine (CYMBALTA) capsule 30 mg  30 mg    melatonin tablet 6 mg  6 mg    acetaminophen (TYLENOL) tablet 500 mg  500 mg    vitamin D2 (Ergocalciferol) (Drisdol) capsule 50,000 Units  50,000 Units    Respiratory Therapy Consult      Pharmacy Consult Request ...Pain Management Review 1 Each  1 Each    omeprazole (PRILOSEC) capsule 20 mg  20 mg    mag hydrox-al hydrox-simeth (MAALOX PLUS ES or MYLANTA DS) suspension 20 mL  20 mL    ondansetron (ZOFRAN ODT) dispertab 4 mg  4 mg    Or    ondansetron (ZOFRAN) syringe/vial injection 4 mg  4 mg    sodium chloride (OCEAN) 0.65 % nasal spray 2 Spray  2 Spray    therapeutic multivitamin-minerals (THERAGRAN-M) tablet 1 Tablet  1 Tablet    acetaminophen (TYLENOL) tablet 500 mg  500 mg    magnesium  "hydroxide (MILK OF MAGNESIA) suspension 30 mL  30 mL    polyethylene glycol/lytes (MIRALAX) PACKET 1 Packet  1 Packet    enoxaparin (Lovenox) inj 30 mg  30 mg    lidocaine (LIDODERM) 5 % 1-3 Patch  1-3 Patch    metaxalone (Skelaxin) tablet 800 mg  800 mg    oxyCODONE immediate-release (ROXICODONE) tablet 5 mg  5 mg    Or    oxyCODONE immediate release (ROXICODONE) tablet 10 mg  10 mg       PHYSICAL EXAM:     VITAL SIGNS:   height is 1.6 m (5' 2.99\") and weight is 70 kg (154 lb 5.2 oz). Her oral temperature is 36.7 °C (98 °F). Her blood pressure is 123/79 and her pulse is 80. Her respiration is 18 and oxygen saturation is 98%.     General: well-groomed in no acute distress, resting in bed, +visitor present, C collar in place  Eyes: no scleral icterus or conjunctival injection  Ears, nose, mouth and throat: moist oral mucosa  Cardiovascular: good peripheral perfusion  Respiratory: breathing comfortably without use of accessory muscles  Gastrointestinal: nondistended  Genitourinary: no indwelling beck  Skin: no wounds seen on exposed skin    Neurologic:  Mental status:  A&Ox4 (person, place, date, situation) answers questions appropriately follows commands  Speech: fluent, no aphasia or dysarthria  Good trunk support unassisted sitting in wheelchair  Tone: no spasticity noted  Psychiatric: appropriate affect  Hematologic/lymphatic/immunologic: no IV access    LABS:  Recent Labs     11/07/22  0533 11/09/22  0555   SODIUM 138 137   POTASSIUM 3.5* 4.2   CHLORIDE 99 100   CO2 29 30   GLUCOSE 71 87   BUN 16 12   CREATININE 0.66 0.62   CALCIUM 9.0 9.0     Recent Labs     11/07/22  0533 11/09/22  0555   WBC 7.3 6.1   RBC 3.72* 3.80*   HEMOGLOBIN 12.3 12.5   HEMATOCRIT 37.9 39.2   .9* 103.2*   MCH 33.1* 32.9   MCHC 32.5* 31.9*   RDW 48.0 50.4*   PLATELETCT 506* 500*   MPV 8.6* 8.4*             PRIMARY REHAB DIAGNOSIS:    This patient is a 42 y.o. female admitted for acute inpatient rehabilitation with "   Trauma.    Medical management / Rehabilitation Issues/ Adverse Potential as part of rehabilitation plan     REHABILITATION ISSUES/ADVERSE POTENTIAL and MEDICAL CO-MORBIDITIES/ADVERSE POTENTIAL AFFECTING FUNCTION:    ##MSK  #Impaired ADLs and mobility  Patient is admitted to [facility] for comprehensive rehabilitation therapy as described.   Rehabilitation nursing monitors bowel and bladder control, educates on medication administration, co-morbidities and monitors patient safety.  Anticipated discharge date: 11/11/2022  Anticipated discharge destination: home with family  Prognosis: good  Equipment: TBD  Postdischarge therapies: TBD  Followup: ortho (Ministerio Watts MD), Neuro ([facility]), PCP, neurosurgery, physiatry (worker's comp preference is [facility])  Precautions: Weight bearing as tolerated in left lower limb. LSO when out of bed (don at edge of bed). C collar at all times. posterior hip precautions  Code: full resuscitation    #Posttraumatic pain, acute, controlled  #Neuropathic pain, acute, controlled  Continue lidoderm patch 5% 1-3 patches daily, skelaxin 800mg TID, oxycodone 5-10mg Q3hr PRN pain, cymbalta 30mg daily, gabapentin 900mg TID    #Polytrauma  fracture of the distal fibula  C6 spinous process fracture  Right T12, L1, and L2 transverse process fractures  Left L4 superior facet fracture  Fracture through the superolateral L4 superior endplate  Posterior left 10th and 11th rib fractures. Right posterior third rib fracture is seen. Right posterior 12th rib fracture at the costovertebral junction  Bilateral nasal bone fractures  Left hip dislocation  Right shoulder with Edema involving the soft tissues surrounding the scapular body possibly representing nondisplaced scapular body fracture; Strain/contusion of the trapezius, deltoid, supraspinatus, infraspinatus, subscapularis and teres minor muscles.  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     ##CARDIAC  DVT prophylaxis: Continue lovenox 30mg BID. Encourage OOB. Monitor daily for signs and symptoms of DVT including but not limited to swelling and pain to prevent the development of DVT that may interfere with therapies.    ##GI  GI prophylaxis:  Continue prilosec 20mg daily to prevent gastritis/dyspepsia which may interfere with therapies.  #At risk of opioid induced constipation  Goal of one continent BM daily  Ordered increase to miralax 17g BID    #At high risk of malnutrition  Dietician monitors nutrient intake, recommend supplements prn and provide nutrition education to pt/family to promote optimal nutrition for wound healing/recovery.   Orders Placed This Encounter   Procedures    Diet Order Diet: Regular     Standing Status:   Standing     Number of Occurrences:   1     Order Specific Question:   Diet:     Answer:   Regular [1]   Continue multivitamin daily  prealbumin levels 28.5    #Elevated liver enzymes, improving  AST 98,   Monitor    ##HEME/ENDO  #Anemia  Monitor    #Vitamin D deficiency  Vit D 25 OH 12  Continue ergocalciferol 50,000 units weekly    ##SKIN  Skin/dermal ulcer prophylaxis: Monitor for new skin conditions with q.2 h. turns as required to prevent the development of skin breakdown.     Kandi Ramirez, DO  Physical  Medicine and Rehabilitation

## 2022-11-09 NOTE — THERAPY
Occupational Therapy  Daily Treatment     Patient Name: Nerissa Dennis  Age:  42 y.o., Sex:  female  Medical Record #: [id number]  Today's Date: 11/9/2022     Precautions  Precautions: Fall Risk, Posterior Hip Precautions, Weight Bearing As Tolerated Left Lower Extremity, Cervical Collar  , Lumbosacral Orthosis, Spinal / Back Precautions   Comments: non-surgical, [place] collar for showers, L ankle aircast    Subjective    Patient seated EOB upon arrival, agreeable to participate in OT.      Objective     11/09/22 1301   OT Charge Group   OT Neuromuscular Re-education / Balance 3   OT Therapy Activity 1   OT Total Time Spent   OT Individual Total Time Spent (Mins) 60   Precautions   Precautions Fall Risk;Posterior Hip Precautions;Weight Bearing As Tolerated Left Lower Extremity;Cervical Collar  ;Lumbosacral Orthosis;Spinal / Back Precautions    Comments non-surgical, [place] collar for showers, L ankle aircast   Vitals   O2 Delivery Device None - Room Air   Cognition    Level of Consciousness Alert   Sleep/Wake Cycle   Sleep & Rest Awake   Interdisciplinary Plan of Care Collaboration   IDT Collaboration with  Family / Caregiver;Physical Therapist   Patient Position at End of Therapy Seated;Family / Friend in Room   Collaboration Comments Transferred care to PT     Towel slides, table top; patient completed 2 sets x 10 of the following: shoulder flexion/extension, shoulder circles (clockwise and counter clockwise). Rest breaks taken between sets due to RUE fatigue    RUE positioned in [place] MAS (level 4-5 tension), patient completed 2 sets x 10 of the following: shoulder flexion/extension, horizontal abd/adduction and scapular protraction/retraction. Patient w/ full ROM for horizontal abd/adduction exercises however required min A to achieve full retraction (@ end range). Shoulder flexion limited to ~120 degrees in MAS.     Vibration applied to R levator scapulae, upper trap, deltoid (anterior, posterior, mid),  level 1 frequency only due to increased hypersensitivity.     Discussed w/ patient and spouse exercises that patient can perform @ home to maximize RUE function (including towel slides, wall walking, wall push ups/WB as tolerated, AROM/AAROM). Patient and spouse verbalized understanding, aware to be cautious w/ going > 90 degrees due to cervical spine precautions.    Assessment    Patient tolerated OT session well with focus on maximizing RUE function, ROM, strength. Patient w/ increased proximal RUE hypersensitivity today, only able to tolerate vibration @ level 1 frequency. Patient w/ improved R horizontal abd/adduction AROM (w/ [place] MAS) this session, able to achieve full ROM without therapist assist or compensating w/ proximal muscles.   Strengths: Able to follow instructions, Alert and oriented, Effective communication skills, Good insight into deficits/needs, Independent prior level of function, Motivated for self care and independence, Pleasant and cooperative, Supportive family, Willingly participates in therapeutic activities  Barriers: Decreased endurance, Generalized weakness, Home accessibility, Impaired activity tolerance, Impaired balance, Limited mobility, Pain    Plan    DC IRF [facility] tomorrow in preparation for DC home on Friday (w/ spouse support).     Occupational Therapy Goals (Active)       Problem: Dressing       Dates: Start: 11/03/22         Goal: STG-Within one week, patient will dress UB w/ set-up for shirt and min-mod A for c-collar        Dates: Start: 11/03/22            Goal: STG-Within one week, patient will dress LB w/ set-up to  SBA       Dates: Start: 11/03/22               Problem: OT Long Term Goals       Dates: Start: 11/03/22         Goal: LTG-By discharge, patient will complete basic self care tasks w/ mod I       Dates: Start: 11/03/22            Goal: LTG-By discharge, patient will perform bathroom transfers w/ mod I       Dates: Start: 11/03/22            Goal: LTG-By  discharge, patient will complete basic home management w/ mod I       Dates: Start: 11/03/22

## 2022-11-09 NOTE — THERAPY
Occupational Therapy  Daily Treatment     Patient Name: Nerissa Dennis  Age:  42 y.o., Sex:  female  Medical Record #: [id number]  Today's Date: 11/9/2022     Precautions  Precautions: (P) Posterior Hip Precautions, TLSO (Thoracolumbosacral orthosis), Lumbosacral Corset, Weight Bearing As Tolerated Left Lower Extremity  Comments: (P) non-surgical, [place] collar for showers, L ankle aircast    Subjective    Patient seated EOB upon arrival, agreeable to participate in OT. Reported hypersensitivity in proximal RUE improved.      Objective     11/09/22 0931   OT Charge Group   OT Electrical Stimulation Attended 1   OT Neuromuscular Re-education / Balance 2   OT Therapy Activity 1   OT Total Time Spent   OT Individual Total Time Spent (Mins) 60   Precautions   Precautions Posterior Hip Precautions;TLSO (Thoracolumbosacral orthosis);Lumbosacral Corset;Weight Bearing As Tolerated Left Lower Extremity   Comments non-surgical, [place] collar for showers, L ankle aircast   Vitals   O2 Delivery Device None - Room Air   Cognition    Level of Consciousness Alert   Sleep/Wake Cycle   Sleep & Rest Awake   Functional Level of Assist   Bed, Chair, Wheelchair Transfer Independent   Interdisciplinary Plan of Care Collaboration   Patient Position at End of Therapy Seated;Call Light within Reach   Collaboration Comments independent in room, no AD     Pt set up on RT-300 FES for RUE neuro re-ed, ROM and sensory input. Electrodes applied to R scap stabilizers, anterior deltoid, biceps, triceps. Muscle stimulation parameters assessed to patient tolerance/modified from initial FES trial (as patient expressed she would like slight increase in stimulation intensity). Pt tolerated 19 min 5 sec of cycling, including warm up and cool down periods with results as follows: distance travelled: 1.57 miles, energy expended: 0.1 kCal, energy per hour: 0.4 kCal/hr, avg power: 0.4 W.  Pt tolerated well overall except c/o discomfort in bicep  "midway through FES activity, thus parameters modified to tolerance.     Reviewed wall walking and wall push-up exercises w/ goal of maximizing RUE ROM and proprioception, patient w/ successful return demo. Dycem provided to prevent RUE from sliding.     Independent for mobility on unit without AD.     Assessment    Patient tolerated FES well w/ modification made to bicep parameters due to discomfort. Patient w/ successful return demo of wall-walking exercise in standing, \"walked\" w/ R fingers up to ~140 degrees shoulder flexion. No c/o pain, tolerable tightness only.   Strengths: Able to follow instructions, Alert and oriented, Effective communication skills, Good insight into deficits/needs, Independent prior level of function, Motivated for self care and independence, Pleasant and cooperative, Supportive family, Willingly participates in therapeutic activities  Barriers: Decreased endurance, Generalized weakness, Home accessibility, Impaired activity tolerance, Impaired balance, Limited mobility, Pain    Plan    Review HEP and complete DC IR[facility] in preparation for DC home Friday (w/ spouse support).       Occupational Therapy Goals (Active)       Problem: Dressing       Dates: Start: 11/03/22         Goal: STG-Within one week, patient will dress UB w/ set-up for shirt and min-mod A for c-collar        Dates: Start: 11/03/22            Goal: STG-Within one week, patient will dress LB w/ set-up to  SBA       Dates: Start: 11/03/22               Problem: OT Long Term Goals       Dates: Start: 11/03/22         Goal: LTG-By discharge, patient will complete basic self care tasks w/ mod I       Dates: Start: 11/03/22            Goal: LTG-By discharge, patient will perform bathroom transfers w/ mod I       Dates: Start: 11/03/22            Goal: LTG-By discharge, patient will complete basic home management w/ mod I       Dates: Start: 11/03/22              "

## 2022-11-09 NOTE — THERAPY
Physical Therapy   Daily Treatment     Patient Name: Nerissa Dennis  Age:  42 y.o., Sex:  female  Medical Record #: [id number]  Today's Date: 11/8/2022     Precautions  Precautions: Posterior Hip Precautions, TLSO (Thoracolumbosacral orthosis), Lumbosacral Corset, Weight Bearing As Tolerated Left Lower Extremity  Comments: non-surgical, [place] collar for showers, L ankle aircast    Subjective    Pt was standing in room upon arrival and agreeable to treatment.  Pt's spouse present during session.      Objective       11/08/22 1401   PT Charge Group   PT Therapeutic Exercise 1   PT Therapeutic Activities 1   PT Manual Therapy 2   PT Total Time Spent   PT Individual Total Time Spent (Mins) 60   Precautions   Precautions Posterior Hip Precautions;TLSO (Thoracolumbosacral orthosis);Lumbosacral Corset;Weight Bearing As Tolerated Left Lower Extremity   Transfer Functional Level of Assist   Bed, Chair, Wheelchair Transfer Independent   Bed Mobility    Supine to Sit Independent   Sit to Supine Independent   Sit to Stand Independent   Scooting Independent   Rolling Modified Independent   Interdisciplinary Plan of Care Collaboration   Patient Position at End of Therapy Seated;Edge of Bed;Call Light within Reach;Tray Table within Reach;Phone within Reach;Family / Friend in Room     Completed manual therapy R upper quadrant: very gentle scapular mobilization - protraction/retraction/upward rotation x 10 reps, STM: upper trap, levator scapulae, teres minor, lat, rhomboids, x 25 min total.  RUE ther ex: bicep curls with focus on essentric contraction, shoulder IR/ER, shoulder flexion in gravity minimized position all x 10 reps each. Completed discussion on D/C planning, POC.     Assessment    Pt with good response to manual therapy with decreased pain reported.  Pt demonstrated overall improving AROM with RUE.   Strengths: Able to follow instructions, Effective communication skills, Good carryover of learning, Good insight into  "deficits/needs, Independent prior level of function, Motivated for self care and independence, Pleasant and cooperative, Supportive family, Willingly participates in therapeutic activities  Barriers: Decreased endurance, Fatigue, Home accessibility, Impaired activity tolerance, Limited mobility, Pain, Pain poorly managed    Plan    Ongoing review of posterior hip precautions, spinal precautions, trial car transfer (weather permitting), cardiovascular/ activity tolerance. Passport. Dec PT to 60 min/ OT to inc to 120 min.       Passport items to be completed:  Get in/out of bed safely, in/out of a vehicle, safely use mobility device, walk or wheel around home/community, navigate up and down stairs, show how to get up/down from the ground, ensure home is accessible, demonstrate HEP, complete caregiver training       Physical Therapy Problems (Active)       Problem: Mobility       Dates: Start: 11/07/22         Goal: STG-Within one week, patient will ambulate community distances 500 ft indep over indoor surfaces, SPV over uneven surfaces       Dates: Start: 11/07/22            Goal: STG-Within one week, patient will ambulate up/down a 2 6\" steps with no AD SPV/indep       Dates: Start: 11/07/22               Problem: Mobility Transfers       Dates: Start: 11/07/22            Problem: PT-Long Term Goals       Dates: Start: 11/03/22         Goal: LTG-By discharge, patient will ambulate x 150 feet without AD and mod I for extra time       Dates: Start: 11/03/22            Goal: LTG-By discharge, patient will transfer one surface to another independent no AD       Dates: Start: 11/03/22            Goal: LTG-By discharge, patient will transfer in/out of a car without AD and SPV       Dates: Start: 11/03/22              "

## 2022-11-09 NOTE — CARE PLAN
Problem: Pain - Standard  Goal: Alleviation of pain or a reduction in pain to the patient’s comfort goal  Note: Medicated per request for  pain, 7/10.Will continue to monitor and assess pain level and medicate as needed.     Problem: Fall Risk - Rehab  Goal: Patient will remain free from falls  Note: Julia Aguero Fall risk Assessment Score: 7    Low fall risk interventions   - Call light within reach   - Yellow  socks   - Belongings within reach   - Bed in the lowest position     Appropriately uses call light to make needs known.Will continue to monitor and assess needs and safety.

## 2022-11-09 NOTE — CARE PLAN
The patient is Stable - Low risk of patient condition declining or worsening    Shift Goals  Clinical Goals: safety  Patient Goals: pain management    Problem: Pain - Standard  Goal: Alleviation of pain or a reduction in pain to the patient’s comfort goal  Outcome: Progressing Patient able to verbalize pain level and verbalize an acceptable level of pain.      Problem: Fall Risk - Rehab  Goal: Patient will remain free from falls  Outcome: Progressing patient has good safety awareness and is Mod I on the unit.

## 2022-11-09 NOTE — THERAPY
Physical Therapy   Daily Treatment     Patient Name: Nerissa Dennis  Age:  42 y.o., Sex:  female  Medical Record #: [id number]  Today's Date: 11/9/2022     Precautions  Precautions: Posterior Hip Precautions, TLSO (Thoracolumbosacral orthosis), Lumbosacral Corset, Weight Bearing As Tolerated Left Lower Extremity  Comments: non-surgical, [place] collar for showers, L ankle aircast    Subjective    Pt and spouse reported readiness to D/C home Friday. They will continue with OP OT for RUE care. They reported gratefulness for care and progress thus far. Pt was pleased with donated shoe, that accommodated swollen foot and air splint. Spouse plans to return for car transfer training this afternoon.      Objective       11/09/22 0831   PT Charge Group   PT Therapeutic Exercise 2   PT Neuromuscular Re-Education / Balance 1   PT Therapeutic Activities 1   PT Total Time Spent   PT Individual Total Time Spent (Mins) 60   Precautions   Precautions Posterior Hip Precautions;TLSO (Thoracolumbosacral orthosis);Lumbosacral Corset;Weight Bearing As Tolerated Left Lower Extremity   Comments non-surgical, [place] collar for showers, L ankle aircast   Pain 0 - 10 Group   Therapist Pain Assessment Prior to Activity;Nurse Notified   Gait Functional Level of Assist    Gait Level Of Assist Independent   Assistive Device None   Distance (Feet) 300   # of Times Distance was Traveled 2   Deviation Bradykinetic   Transfer Functional Level of Assist   Bed, Chair, Wheelchair Transfer Independent   Standing Lower Body Exercises   Hamstring Curl Bilateral ;1 set of 15   Hip Extension Bilateral ;1 set of 15   Hip Abduction Bilateral;1 set of 15   Marching 1 set of 15   Heel Rise Bilateral;1 set of 15   Comments Emphasis on tolerable ROM, and postural stability  (HEP handout provided)   Bed Mobility    Supine to Sit Independent   Sit to Stand Independent   Scooting Independent   Neuro-Muscular Treatments   Neuro-Muscular Treatments Weight  Shift Left;Weight Shift Right;Verbal Cuing;Sequencing   Comments Pt provided with L shoe- donated to wear with air splint for shock absorption and support. Review of post hip precautions. Review of spinal precautions, demo of body mechanics. Pt instructed on use of TheraCane, since she has one for home use, demo'ed/ practice.   Interdisciplinary Plan of Care Collaboration   IDT Collaboration with  Family / Caregiver;Nursing   Collaboration Comments Request for pain meds, POC- car transfer this afternoon, shoe recommendations to accomodate air splint. Home safety/ fall prevention handouts reviewed with patient and spouse. D/C recommendations reviewed.   Strengths & Barriers   Strengths Able to follow instructions;Effective communication skills;Good carryover of learning;Good insight into deficits/needs;Independent prior level of function;Motivated for self care and independence;Pleasant and cooperative;Supportive family;Willingly participates in therapeutic activities   Barriers Decreased endurance;Fatigue;Home accessibility;Impaired activity tolerance;Limited mobility;Pain;Pain poorly managed       Assessment    Pt continues to demonstrate good safety awareness. Pt was able to recall 3:3 hip precautions, but requires intermittent vc to maintain spinal precautions. Pt was able to complete all standing HEP exercises with SPV/mod I after demonstration. Pt demonstrates a high level of motivation. Spouse remains very supportive.      Strengths: (P) Able to follow instructions, Effective communication skills, Good carryover of learning, Good insight into deficits/needs, Independent prior level of function, Motivated for self care and independence, Pleasant and cooperative, Supportive family, Willingly participates in therapeutic activities  Barriers: (P) Decreased endurance, Fatigue, Home accessibility, Impaired activity tolerance, Limited mobility, Pain, Pain poorly managed    Plan    Car transfer training with spouse to  "complete family training, review stairs. D/C data collection.     Passport items to be completed: In/out of a vehicle    Physical Therapy Problems (Active)       Problem: Mobility       Dates: Start: 11/07/22         Goal: STG-Within one week, patient will ambulate community distances 500 ft indep over indoor surfaces, SPV over uneven surfaces       Dates: Start: 11/07/22            Goal: STG-Within one week, patient will ambulate up/down a 2 6\" steps with no AD SPV/indep       Dates: Start: 11/07/22               Problem: Mobility Transfers       Dates: Start: 11/07/22            Problem: PT-Long Term Goals       Dates: Start: 11/03/22         Goal: LTG-By discharge, patient will ambulate x 150 feet without AD and mod I for extra time       Dates: Start: 11/03/22            Goal: LTG-By discharge, patient will transfer one surface to another independent no AD       Dates: Start: 11/03/22            Goal: LTG-By discharge, patient will transfer in/out of a car without AD and SPV       Dates: Start: 11/03/22              "

## 2022-11-09 NOTE — DISCHARGE PLANNING
Cm  Referral for out pt PT/out pt Hand Therapy/dme for TENS unit and shower chair all sent to [employer] plus referral to [facility] in preparation for dc on 11/11

## 2022-11-10 PROCEDURE — 700102 HCHG RX REV CODE 250 W/ 637 OVERRIDE(OP): Performed by: PHYSICAL MEDICINE & REHABILITATION

## 2022-11-10 PROCEDURE — 770010 HCHG ROOM/CARE - REHAB SEMI PRIVAT*

## 2022-11-10 PROCEDURE — 97110 THERAPEUTIC EXERCISES: CPT

## 2022-11-10 PROCEDURE — 700111 HCHG RX REV CODE 636 W/ 250 OVERRIDE (IP): Performed by: PHYSICAL MEDICINE & REHABILITATION

## 2022-11-10 PROCEDURE — RXMED WILLOW AMBULATORY MEDICATION CHARGE: Performed by: PHYSICAL MEDICINE & REHABILITATION

## 2022-11-10 PROCEDURE — 97116 GAIT TRAINING THERAPY: CPT

## 2022-11-10 PROCEDURE — 97535 SELF CARE MNGMENT TRAINING: CPT

## 2022-11-10 PROCEDURE — 97112 NEUROMUSCULAR REEDUCATION: CPT

## 2022-11-10 PROCEDURE — A9270 NON-COVERED ITEM OR SERVICE: HCPCS | Performed by: PHYSICAL MEDICINE & REHABILITATION

## 2022-11-10 PROCEDURE — 97530 THERAPEUTIC ACTIVITIES: CPT

## 2022-11-10 PROCEDURE — 99232 SBSQ HOSP IP/OBS MODERATE 35: CPT | Performed by: PHYSICAL MEDICINE & REHABILITATION

## 2022-11-10 RX ORDER — M-VIT,TX,IRON,MINS/CALC/FOLIC 27MG-0.4MG
1 TABLET ORAL
Qty: 30 TABLET | Refills: 11 | Status: SHIPPED | OUTPATIENT
Start: 2022-11-11

## 2022-11-10 RX ORDER — DULOXETIN HYDROCHLORIDE 30 MG/1
30 CAPSULE, DELAYED RELEASE ORAL DAILY
Qty: 30 CAPSULE | Refills: 2 | Status: SHIPPED | OUTPATIENT
Start: 2022-11-11

## 2022-11-10 RX ORDER — LIDOCAINE 4 G/G
1-3 PATCH TOPICAL EVERY 24 HOURS
Qty: 10 PATCH | Refills: 3 | Status: SHIPPED | OUTPATIENT
Start: 2022-11-10

## 2022-11-10 RX ORDER — GABAPENTIN 300 MG/1
900 CAPSULE ORAL 3 TIMES DAILY
Qty: 270 CAPSULE | Refills: 3 | Status: SHIPPED | OUTPATIENT
Start: 2022-11-10

## 2022-11-10 RX ORDER — ERGOCALCIFEROL 1.25 MG/1
50000 CAPSULE ORAL
Qty: 5 CAPSULE | Refills: 0 | Status: SHIPPED | OUTPATIENT
Start: 2022-11-17

## 2022-11-10 RX ORDER — METAXALONE 800 MG/1
800 TABLET ORAL 3 TIMES DAILY
Qty: 90 TABLET | Refills: 3 | Status: SHIPPED | OUTPATIENT
Start: 2022-11-10

## 2022-11-10 RX ORDER — ACETAMINOPHEN 325 MG/1
650 TABLET ORAL EVERY 4 HOURS PRN
Qty: 30 TABLET | Refills: 3 | Status: SHIPPED | OUTPATIENT
Start: 2022-11-10

## 2022-11-10 RX ORDER — POLYETHYLENE GLYCOL 3350 17 G/17G
17 POWDER, FOR SOLUTION ORAL 2 TIMES DAILY
Qty: 30 EACH | Refills: 3 | Status: SHIPPED | OUTPATIENT
Start: 2022-11-10

## 2022-11-10 RX ORDER — LANOLIN ALCOHOL/MO/W.PET/CERES
6 CREAM (GRAM) TOPICAL
Qty: 30 TABLET | Refills: 2 | Status: SHIPPED | OUTPATIENT
Start: 2022-11-10

## 2022-11-10 RX ORDER — HYDROCODONE BITARTRATE AND ACETAMINOPHEN 5; 325 MG/1; MG/1
.5-1 TABLET ORAL EVERY 6 HOURS PRN
Qty: 56 TABLET | Refills: 0 | Status: SHIPPED | OUTPATIENT
Start: 2022-11-10 | End: 2022-11-25

## 2022-11-10 RX ADMIN — OXYCODONE HYDROCHLORIDE 10 MG: 10 TABLET ORAL at 17:10

## 2022-11-10 RX ADMIN — ACETAMINOPHEN 500 MG: 500 TABLET ORAL at 20:20

## 2022-11-10 RX ADMIN — OXYCODONE HYDROCHLORIDE 10 MG: 10 TABLET ORAL at 11:02

## 2022-11-10 RX ADMIN — Medication 1 TABLET: at 11:02

## 2022-11-10 RX ADMIN — Medication 6 MG: at 20:19

## 2022-11-10 RX ADMIN — OMEPRAZOLE 20 MG: 20 CAPSULE, DELAYED RELEASE ORAL at 07:55

## 2022-11-10 RX ADMIN — GABAPENTIN 900 MG: 300 CAPSULE ORAL at 14:24

## 2022-11-10 RX ADMIN — POLYETHYLENE GLYCOL 3350 1 PACKET: 17 POWDER, FOR SOLUTION ORAL at 07:55

## 2022-11-10 RX ADMIN — ERGOCALCIFEROL 50000 UNITS: 1.25 CAPSULE ORAL at 11:02

## 2022-11-10 RX ADMIN — METAXALONE 800 MG: 800 TABLET ORAL at 20:19

## 2022-11-10 RX ADMIN — ACETAMINOPHEN 500 MG: 500 TABLET ORAL at 07:55

## 2022-11-10 RX ADMIN — ACETAMINOPHEN 500 MG: 500 TABLET ORAL at 14:24

## 2022-11-10 RX ADMIN — GABAPENTIN 900 MG: 300 CAPSULE ORAL at 20:19

## 2022-11-10 RX ADMIN — ENOXAPARIN SODIUM 30 MG: 30 INJECTION SUBCUTANEOUS at 07:55

## 2022-11-10 RX ADMIN — METAXALONE 800 MG: 800 TABLET ORAL at 14:24

## 2022-11-10 RX ADMIN — GABAPENTIN 900 MG: 300 CAPSULE ORAL at 05:38

## 2022-11-10 RX ADMIN — METAXALONE 800 MG: 800 TABLET ORAL at 07:56

## 2022-11-10 RX ADMIN — DULOXETINE HYDROCHLORIDE 30 MG: 30 CAPSULE, DELAYED RELEASE ORAL at 07:55

## 2022-11-10 RX ADMIN — OXYCODONE HYDROCHLORIDE 10 MG: 10 TABLET ORAL at 20:19

## 2022-11-10 RX ADMIN — POLYETHYLENE GLYCOL 3350 1 PACKET: 17 POWDER, FOR SOLUTION ORAL at 20:21

## 2022-11-10 RX ADMIN — OXYCODONE HYDROCHLORIDE 10 MG: 10 TABLET ORAL at 05:38

## 2022-11-10 ASSESSMENT — ACTIVITIES OF DAILY LIVING (ADL)
TOILET_TRANSFER_LEVEL_OF_ASSIST: ABLE TO COMPLETE TOILET TRANSFER WITHOUT ASSIST
TOILETING_LEVEL_OF_ASSIST: ABLE TO COMPLETE TOILETING WITHOUT ASSIST
SHOWER_TRANSFER_LEVEL_OF_ASSIST: ABLE TO COMPLETE SHOWER TRANSFER WITHOUT ASSIST

## 2022-11-10 ASSESSMENT — BRIEF INTERVIEW FOR MENTAL STATUS (BIMS)
WHAT MONTH IS IT: ACCURATE WITHIN 5 DAYS
ASKED TO RECALL BLUE: YES, NO CUE REQUIRED
INITIAL REPETITION OF BED BLUE SOCK - FIRST ATTEMPT: 3
BIMS SUMMARY SCORE: 15
WHAT YEAR IS IT: CORRECT
WHAT DAY OF THE WEEK IS IT: CORRECT
ASKED TO RECALL SOCK: YES, NO CUE REQUIRED
ASKED TO RECALL BED: YES, NO CUE REQUIRED

## 2022-11-10 ASSESSMENT — PAIN SCALES - WONG BAKER: WONGBAKER_NUMERICALRESPONSE: HURTS JUST A LITTLE BIT

## 2022-11-10 ASSESSMENT — GAIT ASSESSMENTS
DISTANCE (FEET): 250
GAIT LEVEL OF ASSIST: MODIFIED INDEPENDENT
DEVIATION: ANTALGIC;BRADYKINETIC
DEVIATION: ANTALGIC;BRADYKINETIC
GAIT LEVEL OF ASSIST: MODIFIED INDEPENDENT
DISTANCE (FEET): 500

## 2022-11-10 NOTE — CARE PLAN
The patient is Stable - Low risk of patient condition declining or worsening    Shift Goals  Clinical Goals: safety  Patient Goals: pain management    Progress made toward(s) clinical / shift goals:      Problem: Pain - Standard  Goal: Alleviation of pain or a reduction in pain to the patient’s comfort goal  Outcome: Progressing  Note: Roxicodone 10mg given PRN per MAR for c/o generalized aches with adequate relief. Pt sleeps good. Will continue to monitor.     Problem: Fall Risk - Rehab  Goal: Patient will remain free from falls  Outcome: Progressing  Note: Julia Aguero Fall risk Assessment Score: 7      Low fall risk interventions   - Call light within reach   - Yellow  socks   - Belongings within reach   - Bed in the lowest position     Pt transfers mod I on unit. She calls appropriately when in need of assistance.             Patient is not progressing towards the following goals:

## 2022-11-10 NOTE — THERAPY
"Physical Therapy   Daily Treatment     Patient Name: Nerissa Dennis  Age:  42 y.o., Sex:  female  Medical Record #: [id number]  Today's Date: 11/9/2022     Precautions  Precautions: (P) Fall Risk, Posterior Hip Precautions, Weight Bearing As Tolerated Left Lower Extremity, Cervical Collar  , Lumbosacral Orthosis, Spinal / Back Precautions   Comments: (P) non-surgical, [place] collar for showers, L ankle aircast    Subjective    Pt appreciated outdoor ambulation. Pt was happy that she could enter/ exit Jeep, but realizes that \"her\" car will be easier.      Objective       11/09/22 1401   PT Charge Group   PT Therapeutic Activities 2   PT Total Time Spent   PT Individual Total Time Spent (Mins) 30   Precautions   Precautions Fall Risk;Posterior Hip Precautions;Weight Bearing As Tolerated Left Lower Extremity;Cervical Collar  ;Lumbosacral Orthosis;Spinal / Back Precautions    Comments non-surgical, [place] collar for showers, L ankle aircast   Gait Functional Level of Assist    Gait Level Of Assist Standby Assist  (outdoors through grass, up/down curbs, through planter beds, across curb cuts)   Assistive Device None   Distance (Feet) 500  (+ 300 indoors)   # of Times Distance was Traveled 1   Deviation Bradykinetic   Bed Mobility    Sit to Stand Independent   Neuro-Muscular Treatments   Neuro-Muscular Treatments Weight Shift Left;Weight Shift Right;Verbal Cuing;Tactile Cuing;Sequencing   Comments Jeep transfers x 3 with spouse to ensure spinal and hip precautions compliance, min A, pushing up with RLE.   Interdisciplinary Plan of Care Collaboration   IDT Collaboration with  Family / Caregiver;Occupational Therapist   Patient Position at End of Therapy Family / Friend in Room   Collaboration Comments Handoff from OT/ POC. Spouse participated in hands on family training for Jeep transfer, including safety, review of hip/ spinal precautions/ compliance, and guarding.   Strengths & Barriers   Strengths Able to follow " "instructions;Effective communication skills;Good carryover of learning;Good insight into deficits/needs;Independent prior level of function;Motivated for self care and independence;Pleasant and cooperative;Supportive family;Willingly participates in therapeutic activities   Barriers Decreased endurance;Fatigue;Home accessibility;Impaired activity tolerance;Limited mobility;Pain;Pain poorly managed       Assessment    Pt demonstrated ability to get into a high Jeep-like vehicle with min A, with min A vc for spinal and hip precaution compliance. Spouse participated in hands on family training, and was receptive to recommendations. Pt participated in outdoor ambulation through various terrain, with good safety awareness SBA/SPV, including grass, uneven surfaces, and curbs.     Strengths: (P) Able to follow instructions, Effective communication skills, Good carryover of learning, Good insight into deficits/needs, Independent prior level of function, Motivated for self care and independence, Pleasant and cooperative, Supportive family, Willingly participates in therapeutic activities  Barriers: (P) Decreased endurance, Fatigue, Home accessibility, Impaired activity tolerance, Limited mobility, Pain, Pain poorly managed    Plan    D/C data collection, reassess stairs without HR (2 [place]). Complete Passport (Car transfer completed 11/9- needs sign off).     Passport items to be completed: In/out of a vehicle      Physical Therapy Problems (Active)       Problem: Mobility       Dates: Start: 11/07/22         Goal: STG-Within one week, patient will ambulate community distances 500 ft indep over indoor surfaces, SPV over uneven surfaces       Dates: Start: 11/07/22            Goal: STG-Within one week, patient will ambulate up/down a 2 6\" steps with no AD SPV/indep       Dates: Start: 11/07/22               Problem: Mobility Transfers       Dates: Start: 11/07/22            Problem: PT-Long Term Goals       Dates: Start: " 11/03/22         Goal: LTG-By discharge, patient will ambulate x 150 feet without AD and mod I for extra time       Dates: Start: 11/03/22            Goal: LTG-By discharge, patient will transfer one surface to another independent no AD       Dates: Start: 11/03/22            Goal: LTG-By discharge, patient will transfer in/out of a car without AD and SPV       Dates: Start: 11/03/22

## 2022-11-10 NOTE — DISCHARGE PLANNING
Case Management/ Follow-up Information:     Josias Schreiber M.D.  Neurosurgery  [address]  [facility] [address]  [phone]   Follow up on 11/15/2022 Tuesday @ 1:15pm     Ministerio Watts M.D. Ortho  [address], [address]  [facility] [address]  [phone]   Follow up on 11/16/2022 Wednesday @ 11:15am      [facility] -Out Patient Physiatry / Rehab Medicine.  [address] [facility] [address]  [facility] [address]  [phone]  [employer] will make follow up appointment for you.         [facility] - Occupational Therapy  [address], [address]  [facility] [id number] [phone]  Please follow up for hand therapy.     [facility]  [address] [facility] [place] [address]  [phone]  Provider for out patient therapy.  Please call to schedule follow up appointment.       [facility]  [address]  [facility] [address]  [phone]  Provider for shower chiar.     Hip Kit recommended - item will need to be ordered on [employer] and submit to [employer] for reimbursement.      Prescriptions obtained through [id number]

## 2022-11-10 NOTE — THERAPY
Occupational Therapy  Daily Treatment     Patient Name: Nerissa Dennis  Age:  42 y.o., Sex:  female  Medical Record #: [id number]  Today's Date: 11/10/2022     Precautions  Precautions: Fall Risk, Posterior Hip Precautions, Weight Bearing As Tolerated Left Lower Extremity, Spinal / Back Precautions , Lumbosacral Orthosis  Comments: non-surgical, Churchill collar for showers, L ankle aircast    Subjective    Patient seated EOB w/ spouse upon arrival, agreeable to participate in OT. Eager to DC home tomorrow.      Objective     11/10/22 0931   OT Charge Group   OT Self Care / ADL 3   OT Neuromuscular Re-education / Balance 2   OT Total Time Spent   OT Individual Total Time Spent (Mins) 60   Precautions   Precautions Fall Risk;Posterior Hip Precautions;Weight Bearing As Tolerated Left Lower Extremity;Spinal / Back Precautions ;Lumbosacral Orthosis   Comments non-surgical, Churchill collar for showers, L ankle aircast   Vitals   O2 Delivery Device None - Room Air   Cognition    Level of Consciousness Alert   Sleep/Wake Cycle   Sleep & Rest Awake   Functional Level of Assist   Eating Independent   Grooming Modified Independent  (to comb hair using LUE)   Bathing Modified Independent  (mod I for all bathing tasks w/ use of LH sponge, incorporated sitting on fold down shower bench and standing w/ GB, increased time required due to RUE weakness)   Upper Body Dressing Minimal Assist  (Set-up/increased time to don sports bra and t-shirt while seated, min A for C-collar due to RUE weakness)   Lower Body Dressing Modified Independent  (to don underwear, elastic waist pants and non skid socks w/ AE (reacher and sock aid))   Tub / Shower Transfers Independent  (no AD)   Interdisciplinary Plan of Care Collaboration   Patient Position at End of Therapy Seated  (EOB, independent on unit)     Independent to return w/c and FWW to DME room.     Assessment    Patient tolerated OT session well with focus on ADLs for DC [facility]  assessment. Patient olga's ability to complete ADL routine independently overall except requires min A to don C-collar due to RUE weakness. Spouse verbalized understanding how to properly don c-collar. Patient olga's readiness to DC home tomorrow.   Strengths: Able to follow instructions, Alert and oriented, Effective communication skills, Good insight into deficits/needs, Independent prior level of function, Motivated for self care and independence, Pleasant and cooperative, Supportive family, Willingly participates in therapeutic activities  Barriers: Decreased endurance, Generalized weakness, Home accessibility, Impaired activity tolerance, Impaired balance, Limited mobility, Pain    Plan    DC home tomorrow w/ spouse support.     Occupational Therapy Goals (Active)       Problem: Dressing       Dates: Start: 11/03/22         Goal: STG-Within one week, patient will dress UB w/ set-up for shirt and min-mod A for c-collar        Dates: Start: 11/03/22            Goal: STG-Within one week, patient will dress LB w/ set-up to  SBA       Dates: Start: 11/03/22               Problem: OT Long Term Goals       Dates: Start: 11/03/22         Goal: LTG-By discharge, patient will complete basic self care tasks w/ mod I       Dates: Start: 11/03/22            Goal: LTG-By discharge, patient will perform bathroom transfers w/ mod I       Dates: Start: 11/03/22            Goal: LTG-By discharge, patient will complete basic home management w/ mod I       Dates: Start: 11/03/22

## 2022-11-10 NOTE — DISCHARGE INSTRUCTIONS
[facility] NURSING DISCHARGE INSTRUCTIONS    Blood Pressure: 119/52  Weight: 70 kg (154 lb 5.2 oz)  Nursing recommendations for Nerissa Dennis at time of discharge are as follows:  Client and Family Member verbalized understanding of all discharge instructions and prescriptions.     Review all your home medications and newly ordered medications with your doctor and/or pharmacist. Follow medication instructions as directed by your doctor and/or pharmacist.    Pain Management:   Discharge Pain Medication Instructions:  Comfort Goal: Comfort with Movement, Sleep Comfortably  Notify your primary care provider if pain is unrelieved with these measures, if the pain is new, or increased in intensity.    Discharge Skin Characteristics: Warm, Dry  Discharge Skin Exam: Clear  Wound 11/02/22 Face Anterior Right abrasion (Active)   Site Assessment Clean;Dry;Intact 11/10/22 2020   Periwound Assessment Clean;Dry;Intact 11/10/22 2020   Closure Open to air 11/10/22 2020   Drainage Amount None 11/10/22 2020   Dressing Options Open to Air 11/10/22 2020   Dressing Status Open to Air 11/10/22 2020   NEXT Weekly Photo (Inpatient Only) 11/17/22 11/10/22 2020   Wound Odor None 11/04/22 0820     Skin / Wound Care Instructions: Please contact your primary care physician for any change in skin integrity.     If You Have Surgical Incisions / Wounds:  Monitor surgical site(s) for signs of increased swelling, redness or symptoms of drainage from the site or fever as this could indicate signs and symptoms of infection. If these symptoms are noted, notifiy your primary care provider.      Discharge Safety Instructions: No Supervision Needed     Discharge Safety Concerns: No Concerns Noted  The interdisciplinary team has made recommendation that you do not require supervision in the house due to demonstration of safety with ADL's and IADLS and problem solving skills  Anti-embolic stockings are not required to increase  circulation to the lower extremities.    Discharge Diet: Regular     Discharge Liquids: Thin  Discharge Bowel Function: Continent  Please contact your primary care physician for any changes in bowel habits.  Discharge Bowel Program:    Discharge Bladder Function: Continent  Discharge Urinary Devices: None      Nursing Discharge Plan:        Case Management Discharge Instructions:   Discharge Location:    Agency Name/Address/Phone:    Home Health:    Outpatient Services:    DME Provider/Phone:    Medical Equipment Ordered:    Prescription Faxed to:        Discharge Medication Instructions:  Below are the medications your physician expects you to take upon discharge:    Cast or Splint Care, Adult  Casts and splints are supports that are worn to protect broken bones and other injuries. A cast or splint may hold a bone still and in the correct position while it heals. Casts and splints may also help ease pain, swelling, and muscle spasms.  A cast is a hardened support that is usually made of fiberglass or plaster. It is custom-fit to the body and it offers more protection than a splint. It cannot be taken off and put back on. A splint is a type of soft support that is usually made from cloth and elastic. It can be adjusted or taken off as needed.  You may need a cast or a splint if you:  Have a broken bone.  Have a soft-tissue injury.  Need to keep an injured body part from moving (keep it immobile) after surgery.  How is this treated?  If you have a cast:    Do not stick anything inside the cast to scratch your skin. Sticking something in the cast increases your risk of infection.  Check the skin around the cast every day. Tell your health care provider about any concerns.  You may put lotion on dry skin around the edges of the cast. Do not put lotion on the skin underneath the cast.  Keep the cast clean.  If the cast is not waterproof:  Do not let it get wet.  Cover it with a watertight covering when you take a bath  or a shower.  If you have a splint:    Wear it as told by your health care provider. Remove it only as told by your health care provider.  Loosen the splint if your fingers or toes tingle, become numb, or turn cold and blue.  Keep the splint clean.  If the splint is not waterproof:  Do not let it get wet.  Cover it with a watertight covering when you take a bath or a shower.  Bathing  Do not take baths or swim until your health care provider approves. Ask your health care provider if you can take showers. You may only be allowed to take sponge baths for bathing.  If your cast or splint is not waterproof, cover it with a watertight covering when you take a bath or shower.  Managing pain, stiffness, and swelling  Move your fingers or toes often to avoid stiffness and to lessen swelling.  Raise (elevate) the injured area above the level of your heart while sitting or lying down.  Safety  Do not use the injured limb to support your body weight until your health care provider says that it is okay.  Use crutches or other assistive devices as told by your health care provider.  General instructions  Do not put pressure on any part of the cast or splint until it is fully hardened. This may take several hours.  Return to your normal activities as told by your health care provider. Ask your health care provider what activities are safe for you.  Take over-the-counter and prescription medicines only as told by your health care provider.  Keep all follow-up visits as told by your health care provider. This is important.  Contact a health care provider if:  Your cast or splint gets damaged.  The skin around the cast gets red or raw.  The skin under the cast is extremely itchy or painful.  Your cast or splint feels very uncomfortable.  Your cast or splint is too tight or too loose.  Your cast becomes wet or it develops a soft spot or area.  You get an object stuck under your cast.  Get help right away if:  Your pain is getting  worse.  The injured area tingles, becomes numb, or turns cold and blue.  The part of your body above or below the cast is swollen and discolored.  You cannot feel or move your fingers or toes.  There is fluid leaking through the cast.  You have severe pain or pressure under the cast.  You have trouble breathing.  You have shortness of breath.  You have chest pain.  This information is not intended to replace advice given to you by your health care provider. Make sure you discuss any questions you have with your health care provider.  Document Released: 12/15/2001 Document Revised: 10/15/2018 Document Reviewed: 06/10/2017  [employer] Patient Education © 2020 [employer]    Traumatic Brain Injury  Traumatic brain injury (TBI) is an injury to the brain that results from:  A hard, direct blow to the head (closed injury).  An object penetrating the skull and entering the brain (open injury).  Traumatic brain injury is also called a head injury or a concussion. TBI can be mild, moderate, or severe.  What are the causes?  Common causes of this condition include:  Falls.  Motor vehicle accidents.  Sports injuries.  Assaults.  What increases the risk?  You are more likely to develop this condition if you:  Are 75 years old or older.  Are a man.  Play contact sports, especially football, hockey, or soccer.  Are in the .  Are a victim of violence.  Abuse drugs or alcohol.  Have had a previous TBI.  What are the signs or symptoms?  Symptoms may vary from person to person, and may include:  Loss of consciousness.  Headache.  Confusion.  Fatigue.  Changes in sleep.  Dizziness.  Mood or personality changes.  Memory problems.  Nausea or vomiting or both.  Seizures.  Clumsiness.  Slurred speech.  Depression and anxiety.  Anger.  Trouble concentrating, organizing, or making decisions.  Inability to control emotions or actions (impulse control).  Loss of or dulling of the senses, such as hearing, vision, and touch. This can  include:  Blurred vision.  Ringing in your ears.  How is this diagnosed?  This condition may be diagnosed based on:  Medical history and physical exam.  Neurologic exam. This checks for brain and nervous system function, including your reflexes, memory, and coordination.  CT scan.  Your TBI may be described as mild, moderate, or severe.  How is this treated?  Treatment depends on the severity of your brain injury and may include:  Breathing support (mechanical ventilation).  Blood pressure medicines.  Pain medicines.  Treatments to decrease the swelling in your brain.  Brain surgery. This may be needed to:  Remove a blood clot.  Repair bleeding.  Remove an object that has penetrated the brain, such as a skull fragment or a bullet.  Treatment of TBI also includes:  Physical and mental rest.  Careful observation.  Medicine. You may be prescribed medicines to help with symptoms such as headaches, nausea, or difficulty sleeping.  Physical, occupational, and speech therapy.  Referral to a concussion clinic or rehabilitation center.  Follow these instructions at home:  Medicines  Take over-the-counter and prescription medicines only as told by your health care provider.  Do not take blood thinners (anticoagulants), aspirin, or other anti-inflammatory medicines such as ibuprofen or naproxen unless approved by your health care provider.  Activity  Rest. Rest helps the brain to heal. Make sure you:  Get plenty of sleep. Most adults should get 7-9 hours of sleep each night.  Rest during the day. Take daytime naps or rest breaks when you feel tired.  Do not do high-risk activities that could cause a second concussion, such as riding a bike or playing sports. Having another concussion before the first one has healed can be dangerous.  Avoid a lot of visual stimulation. This includes work on the computer or phone, watching TV, and reading.  Ask your health care provider what kind of activities are safe for you. Your ability to  react may be slower after a brain injury. Never do these activities if you are dizzy. Your health care provider will likely give you a plan for gradually returning to activities.  General instructions  Do not drink alcohol.  Watch your symptoms and tell others to do the same. Complications sometimes occur after a brain injury. Older adults with a brain injury may have a higher risk of serious complications.  Seek support from friends and family.  Keep all follow-up visits as directed by your health care provider. This is important.  Contact a health care provider if:  Your symptoms get worse or they do not improve.  You have new symptoms.  You have another injury.  Get help right away if:  You have:  Severe, persistent headaches that are not relieved by medicine.  Weakness or numbness in any part of your body.  Confusion.  Slurred speech.  Difficulty waking up.  Nausea or persistent vomiting.  A feeling like you are moving when you are not (vertigo).  Seizures or you faint.  Changes in your vision.  Clear or bloody discharge from your nose or ears.  You cannot use your arms or legs normally.  Summary  Traumatic brain injury happens when there is a hard, direct blow to the head or when an object penetrates the skull and enters the brain.  Traumatic brain injuries may be mild, moderate, or severe. Treatment depends on the severity of your injury.  Get help right away if you have a head injury and you develop seizures, confusion, vomiting, weakness in the arms or legs, slurred speech, and other symptoms.  Rest is one of the best treatments. Do not return to activity until your health care provider approves.  This information is not intended to replace advice given to you by your health care provider. Make sure you discuss any questions you have with your health care provider.  Document Released: 12/08/2003 Document Revised: 04/24/2019 Document Reviewed: 02/04/2019  [employer] Patient Education © 2020 [employer]  Fall  Prevention in the Home, Adult  Falls can cause injuries. They can happen to people of all ages. There are many things you can do to make your home safe and to help prevent falls. Ask for help when making these changes, if needed.  What actions can I take to prevent falls?  General Instructions  Use good lighting in all rooms. Replace any light bulbs that burn out.  Turn on the lights when you go into a dark area. Use night-lights.  Keep items that you use often in easy-to-reach places. Lower the shelves around your home if necessary.  Set up your furniture so you have a clear path. Avoid moving your furniture around.  Do not have throw rugs and other things on the floor that can make you trip.  Avoid walking on wet floors.  If any of your floors are uneven, fix them.  Add color or contrast paint or tape to clearly christopher and help you see:  Any grab bars or handrails.  First and last steps of stairways.  Where the edge of each step is.  If you use a stepladder:  Make sure that it is fully opened. Do not climb a closed stepladder.  Make sure that both sides of the stepladder are locked into place.  Ask someone to hold the stepladder for you while you use it.  If there are any pets around you, be aware of where they are.  What can I do in the bathroom?         Keep the floor dry. Clean up any water that spills onto the floor as soon as it happens.  Remove soap buildup in the tub or shower regularly.  Use non-skid mats or decals on the floor of the tub or shower.  Attach bath mats securely with double-sided, non-slip rug tape.  If you need to sit down in the shower, use a plastic, non-slip stool.  Install grab bars by the toilet and in the tub and shower. Do not use towel bars as grab bars.  What can I do in the bedroom?  Make sure that you have a light by your bed that is easy to reach.  Do not use any sheets or blankets that are too big for your bed. They should not hang down onto the floor.  Have a firm chair that has  side arms. You can use this for support while you get dressed.  What can I do in the kitchen?  Clean up any spills right away.  If you need to reach something above you, use a strong step stool that has a grab bar.  Keep electrical cords out of the way.  Do not use floor polish or wax that makes floors slippery. If you must use wax, use non-skid floor wax.  What can I do with my stairs?  Do not leave any items on the stairs.  Make sure that you have a light switch at the top of the stairs and the bottom of the stairs. If you do not have them, ask someone to add them for you.  Make sure that there are handrails on both sides of the stairs, and use them. Fix handrails that are broken or loose. Make sure that handrails are as long as the stairways.  Install non-slip stair treads on all stairs in your home.  Avoid having throw rugs at the top or bottom of the stairs. If you do have throw rugs, attach them to the floor with carpet tape.  Choose a carpet that does not hide the edge of the steps on the stairway.  Check any carpeting to make sure that it is firmly attached to the stairs. Fix any carpet that is loose or worn.  What can I do on the outside of my home?  Use bright outdoor lighting.  Regularly fix the edges of walkways and driveways and fix any cracks.  Remove anything that might make you trip as you walk through a door, such as a raised step or threshold.  Trim any bushes or trees on the path to your home.  Regularly check to see if handrails are loose or broken. Make sure that both sides of any steps have handrails.  Install guardrails along the edges of any raised decks and porches.  Clear walking paths of anything that might make someone trip, such as tools or rocks.  Have any leaves, snow, or ice cleared regularly.  Use sand or salt on walking paths during winter.  Clean up any spills in your garage right away. This includes grease or oil spills.  What other actions can I take?  Wear shoes that:  Have a  low heel. Do not wear high heels.  Have rubber bottoms.  Are comfortable and fit you well.  Are closed at the toe. Do not wear open-toe sandals.  Use tools that help you move around (mobility aids) if they are needed. These include:  Canes.  Walkers.  Scooters.  Crutches.  Review your medicines with your doctor. Some medicines can make you feel dizzy. This can increase your chance of falling.  Ask your doctor what other things you can do to help prevent falls.  Where to find more information  Centers for Disease Control and Prevention, [place]: https://cdc.gov  National [place] on Aging: https://fl8kngn.jayden.nih.gov  Contact a doctor if:  You are afraid of falling at home.  You feel weak, drowsy, or dizzy at home.  You fall at home.  Summary  There are many simple things that you can do to make your home safe and to help prevent falls.  Ways to make your home safe include removing tripping hazards and installing grab bars in the bathroom.  Ask for help when making these changes in your home.  This information is not intended to replace advice given to you by your health care provider. Make sure you discuss any questions you have with your health care provider.  Document Released: 10/14/2010 Document Revised: 04/09/2020 Document Reviewed: 08/02/2018  [employer] Patient Education © 2020 [employer]    Pain Medicine Instructions  You may need pain medicine after an injury or illness. Two common types of pain medicine are:  Opioid pain medicine. These may be called opioids.  Non-opioid pain medicine. This includes NSAIDs.  It is important to follow your doctor's instructions when you are taking pain medicine. Doing this can keep yourself and others safe.  How can pain medicine affect me?  Pain medicine may not make all of your pain go away. It should make you comfortable enough to:  Move.  Breathe.  Do normal activities.  Opioids can cause side effects, such as:  Trouble pooping (constipation).  Feeling sick to your  stomach (nausea).  Throwing up (vomiting).  Feeling very sleepy.  Confusion.  Taking the medicine for nonmedical reasons even though taking it hurts your health and well-being (opioid use disorder).  Trouble breathing (respiratory depression).  Taking opioids for longer than 3 days raises your risk of these side effects.  Taking opioids for a long time can affect how well you can do daily tasks. Taking them for a long time also puts you at risk for:  Car crashes.  Depression.  Suicide.  Heart attack.  Taking too much of the medicine (overdose). This can lead to death.  What should I do to stay safe while taking pain medicine?  Take your medicine as told  Take pain medicine exactly as told by your doctor. Take it only when you need it.  Write down the times when you take your pain medicine. Look at the times before you take your next dose.  Take other over-the-counter or prescription medicines only as told by your doctor.  If your pain medicine has acetaminophen in it, do not take any other acetaminophen while you are taking this medicine. Too much can damage the liver.  Get pain medicine prescriptions from only one doctor.  Avoid certain activities  While you are taking prescription pain medicine, and for 8 hours after your last dose:  Do not drive.  Do not use machinery.  Do not use power tools.  Do not sign legal documents.  Do not drink alcohol.  Do not take sleeping pills.  Do not take care of children by yourself.  Do not do any activities that involve climbing or being in high places.  Do not go into any body of water unless there is an adult nearby who can watch you and help you if needed. This includes:  Lakes.  Rivers.  Oceans.  Spas.  Swimming pools.    Keep others safe  Store your medicine as told by your doctor. Keep it where children and pets cannot reach it.  Do not share your pain medicine with anyone.  Do not save any leftover pills. If you have leftover pills, you can:  Bring them to a take-back  program.  Bring them to a pharmacy that has a drug disposal container.  Throw them in the trash. Check the medicine label or package insert to see if it is safe to throw it out. If it is safe, take the medicine out of the container. Mix it with something that makes it unusable, such as pet waste. Then put the medicine in the trash.  General instructions  Talk with your doctor about other ways to manage your pain.  If you have trouble pooping:  Drink enough fluid to keep your pee (urine) pale yellow.  Use a poop (stool) softener as told by your doctor.  Eat more fruits and vegetables.  Keep all follow-up visits as told by your doctor. This is important.  Contact a doctor if:  Your medicine is not helping with your pain.  You have a rash.  You feel depressed.  Get help right away if:  Seek medical care right away if you are taking pain medicines and you (or people close to you) notice any of the following:  Trouble breathing.  Breathing that is shorter than normal.  Breathing that is more shallow than normal.  Confusion.  Sleepiness.  Trouble staying awake.  Feeling sick to your stomach.  Throwing up.  Your skin or lips turning pale or bluish in color.  Tongue swelling.  If you ever feel like you may hurt yourself or others, or have thoughts about taking your own life, get help right away. Go to your nearest emergency department or call:  Your local emergency services (911 in the [place]  A suicide crisis helpline, such as the National Suicide Prevention Lifeline at 1-387.846.7115. This is open 24 hours a day.  Summary  Take your pain medicine exactly as told by your doctor.  Pain medicine can help lower your pain. It may also cause side effects.  Talk with your doctor about other ways to manage your pain.  Follow your doctor's instructions about how to take your pain medicine and keep others safe. Ask what activities you should avoid while taking pain medicine.  This information is not intended to replace advice given  to you by your health care provider. Make sure you discuss any questions you have with your health care provider.  Document Released: 06/05/2009 Document Revised: 11/30/2018 Document Reviewed: 07/30/2018  [employer] Patient Education © 2020 [employer]    Depression, Adult  Depression is feeling sad, low, down in the dumps, blue, gloomy, or empty. In general, there are two kinds of depression:  Normal sadness or grief. This can happen after something upsetting. It often goes away on its own within 2 weeks. After losing a loved one (bereavement), normal sadness and grief may last longer than two weeks. It usually gets better with time.  Clinical depression. This kind lasts longer than normal sadness or grief. It keeps you from doing the things you normally do in life. It is often hard to function at home, work, or at school. It may affect your relationships with others. Treatment is often needed.  GET HELP RIGHT AWAY IF:  You have thoughts about hurting yourself or others.  You lose touch with reality (psychotic symptoms). You may:  See or hear things that are not real.  Have untrue beliefs about your life or people around you.  Your medicine is giving you problems.  MAKE SURE YOU:  Understand these instructions.  Will watch your condition.  Will get help right away if you are not doing well or get worse.     This information is not intended to replace advice given to you by your health care provider. Make sure you discuss any questions you have with your health care provider.     Document Released: 01/20/2012 Document Revised: 01/08/2016 Document Reviewed: 04/18/2013  [employer] Interactive Patient Education ©2016 [employer]          Physical Therapy Discharge Instructions for Nerissa Dennis    11/10/2022    Weight Bearing Status - Patient Should: Bear Weight as Tolerated Left Leg  Level of Assist Required for Ambulation: No Assist on Flat Surfaces, Supervision on Curbs, Supervision on Stairs  Distance Patient May  Ambulate: 500' +  Device Recommended for Ambulation: None  Level of Assist Required for Transfers: Requires No Assist  Device Recommended for Transfers: None  Home Exercise Program: Refer to Home Exercise Program Handout for Details    Thank you for all your hard work, take care at home!    Occupational Therapy Discharge Instructions for Nerissa Dennis    11/10/2022    Level of Assist Required for Eating: Able to Complete Eating without Assist  Level of Assist Required for Grooming: Able to Complete Grooming without Assist  Level of Assist Required for Dressing: Able to Complete Dressing without Assist  Equipment for Dressing: Sock Aid, Reacher  Level of Assist Required for Toileting: Able to Complete Toileting without Assist  Level of Assist Required for Toilet Transfer: Able to Complete Toilet Transfer without Assist  Level of Assist Required for Bathing: Able to Complete Bathing without Assist  Equipment for Bathing: Shower Chair, Hand Held Shower Head, Long Handled Sponge  Level of Assist Required for Shower Transfer: Able to Complete Shower Transfer without Assist  Equipment for Shower Transfer: Shower Chair  Level of Assist Required for Home Mgmt: Requires Supervision with Home Management  Level of Assist Required for Meal Prep: Requires Supervision with Meal Preparation  Driving: Please Contact Physician Prior to Driving  Home Exercise Program: Refer to Home Exercise Program Handout for Details (stretches, R arm exercises (ie wall walking, shrugs, pinch shoulder blades back))    Congratulations on graduating from rehabNerissa ! It has been such a pleasure working with you and witnessing you make such huge gains each day. You should be so proud of yourself. I cannot wait to see/hear about your future accomplishments! Remember to have patience with yourself and your body. I know you will continue to do amazing things! Best wishes with your recovery!  [email or web address]

## 2022-11-10 NOTE — THERAPY
Occupational Therapy  Daily Treatment     Patient Name: Nerissa Dennis  Age:  42 y.o., Sex:  female  Medical Record #: [id number]  Today's Date: 11/10/2022     Precautions  Precautions: Fall Risk, Posterior Hip Precautions, Weight Bearing As Tolerated Left Lower Extremity, Spinal / Back Precautions , Lumbosacral Orthosis  Comments: non-surgical, Carlisle collar for showers, L ankle aircast    Subjective    Received patient standing in hallway, agreeable to participate in OT.      Objective       11/10/22 1431   OT Charge Group   OT Therapeutic Exercise  2   OT Total Time Spent   OT Individual Total Time Spent (Mins) 30     Reviewed RUE HEP including wall walking, scapular pro/retraction, shoulder flexion/extension, shoulder circles, elbow stretches. Reviewed various position options (ie supine, table top, seated in chair) and safety considerations. Patient verbalized and demo'd understanding.     Assessment    Patient tolerated OT session well with focus on RUE HEP. Patient w/ successful demo of RUE exercises. Benefits from using mirror for visual feedback. Handouts provided to maximize carryover of exercises.   Strengths: Able to follow instructions, Alert and oriented, Effective communication skills, Good insight into deficits/needs, Independent prior level of function, Motivated for self care and independence, Pleasant and cooperative, Supportive family, Willingly participates in therapeutic activities  Barriers: Decreased endurance, Generalized weakness, Home accessibility, Impaired activity tolerance, Impaired balance, Limited mobility, Pain    Plan    DC home tomorrow w/ spouse support. Hand therapy f/u recommended.     Occupational Therapy Goals (Active)       Problem: Dressing       Dates: Start: 11/03/22         Goal: STG-Within one week, patient will dress UB w/ set-up for shirt and min-mod A for c-collar        Dates: Start: 11/03/22            Goal: STG-Within one week, patient will dress LB w/ set-up to   SBA       Dates: Start: 11/03/22

## 2022-11-10 NOTE — PROGRESS NOTES
"REHABILITATION PROGRESS NOTE    Date of Admission: 11/2/2022    [facility] Code / Diagnosis to Support: 0014.2 - Major Multiple Trauma: Brain + Multiple Fracture/Amputation  Etiologic Diagnosis: Trauma    SUBJECTIVE  Patient seen in room resting in bed.  GI: had BM today, loose due to milk of mag  : continent  Psych: sleep unchanged  MSK: pain improving, patient reports nerve pain is well controlled    I have performed a physical exam and reviewed and updated history and plan today (11/10/2022).     MEDICATIONS:  Current Facility-Administered Medications   Medication Dose    polyethylene glycol/lytes (MIRALAX) PACKET 1 Packet  1 Packet    gabapentin (NEURONTIN) capsule 900 mg  900 mg    DULoxetine (CYMBALTA) capsule 30 mg  30 mg    melatonin tablet 6 mg  6 mg    acetaminophen (TYLENOL) tablet 500 mg  500 mg    vitamin D2 (Ergocalciferol) (Drisdol) capsule 50,000 Units  50,000 Units    Respiratory Therapy Consult      Pharmacy Consult Request ...Pain Management Review 1 Each  1 Each    omeprazole (PRILOSEC) capsule 20 mg  20 mg    mag hydrox-al hydrox-simeth (MAALOX PLUS ES or MYLANTA DS) suspension 20 mL  20 mL    ondansetron (ZOFRAN ODT) dispertab 4 mg  4 mg    Or    ondansetron (ZOFRAN) syringe/vial injection 4 mg  4 mg    sodium chloride (OCEAN) 0.65 % nasal spray 2 Spray  2 Spray    therapeutic multivitamin-minerals (THERAGRAN-M) tablet 1 Tablet  1 Tablet    acetaminophen (TYLENOL) tablet 500 mg  500 mg    magnesium hydroxide (MILK OF MAGNESIA) suspension 30 mL  30 mL    enoxaparin (Lovenox) inj 30 mg  30 mg    lidocaine (LIDODERM) 5 % 1-3 Patch  1-3 Patch    metaxalone (Skelaxin) tablet 800 mg  800 mg    oxyCODONE immediate-release (ROXICODONE) tablet 5 mg  5 mg    Or    oxyCODONE immediate release (ROXICODONE) tablet 10 mg  10 mg       PHYSICAL EXAM:     VITAL SIGNS:   height is 1.6 m (5' 2.99\") and weight is 70 kg (154 lb 5.2 oz). Her oral temperature is 36.9 °C (98.4 °F). Her blood pressure is 114/78 and her " pulse is 84. Her respiration is 18 and oxygen saturation is 99%.     General: well-groomed in no acute distress, resting in bed, no visitor present, C collar in place  Eyes: no scleral icterus or conjunctival injection  Ears, nose, mouth and throat: moist oral mucosa  Cardiovascular: good peripheral perfusion  Respiratory: breathing comfortably without use of accessory muscles  Gastrointestinal: nondistended  Genitourinary: no indwelling beck  Skin: no wounds seen on exposed skin    Neurologic:  Mental status:  A&Ox4 (person, place, date, situation) answers questions appropriately follows commands  Speech: fluent, no aphasia or dysarthria  Good trunk support unassisted sitting in wheelchair  Tone: no spasticity noted  Psychiatric: appropriate affect  Hematologic/lymphatic/immunologic: no IV access    LABS:  Recent Labs     11/09/22  0555   SODIUM 137   POTASSIUM 4.2   CHLORIDE 100   CO2 30   GLUCOSE 87   BUN 12   CREATININE 0.62   CALCIUM 9.0     Recent Labs     11/09/22  0555   WBC 6.1   RBC 3.80*   HEMOGLOBIN 12.5   HEMATOCRIT 39.2   .2*   MCH 32.9   MCHC 31.9*   RDW 50.4*   PLATELETCT 500*   MPV 8.4*             PRIMARY REHAB DIAGNOSIS:    This patient is a 42 y.o. female admitted for acute inpatient rehabilitation with   Trauma.    Medical management / Rehabilitation Issues/ Adverse Potential as part of rehabilitation plan     REHABILITATION ISSUES/ADVERSE POTENTIAL and MEDICAL CO-MORBIDITIES/ADVERSE POTENTIAL AFFECTING FUNCTION:    ##MSK  #Impaired ADLs and mobility  Patient is admitted to [facility] for comprehensive rehabilitation therapy as described.   Rehabilitation nursing monitors bowel and bladder control, educates on medication administration, co-morbidities and monitors patient safety.  Anticipated discharge date: 11/11/2022  Anticipated discharge destination: home with family  Prognosis: good  Postdischarge therapies: outpatient PT and OT  Followup: ortho (Ministerio Bradford,  MD), Neuro ([facility]), PCP, neurosurgery, physiatry (worker's comp preference is [facility])  Precautions: Weight bearing as tolerated in left lower limb. LSO when out of bed (don at edge of bed). C collar at all times. posterior hip precautions  Code: full resuscitation    #Posttraumatic pain, acute, controlled  #Neuropathic pain, acute, controlled  Continue lidoderm patch 5% 1-3 patches daily, skelaxin 800mg TID, oxycodone 5-10mg Q3hr PRN pain, cymbalta 30mg daily, gabapentin 900mg TID  Discharged patient with norco 5/325mg 56 tablets for two week supply    #Polytrauma  fracture of the distal fibula  C6 spinous process fracture  Right T12, L1, and L2 transverse process fractures  Left L4 superior facet fracture  Fracture through the superolateral L4 superior endplate  Posterior left 10th and 11th rib fractures. Right posterior third rib fracture is seen. Right posterior 12th rib fracture at the costovertebral junction  Bilateral nasal bone fractures  Left hip dislocation  Right shoulder with Edema involving the soft tissues surrounding the scapular body possibly representing nondisplaced scapular body fracture; Strain/contusion of the trapezius, deltoid, supraspinatus, infraspinatus, subscapularis and teres minor muscles.  MRI Cspine interspinous lig stretch injury no overt Fx aside form C6 sp, no sign of nerve root injury on scan     ##NEURO  #Acute moderate traumatic brain injury  3.3 mm left posterior subdural hematoma, bilateral nasal bone fracture  LOC less than five minutes per documentation  keppra 500mg BID for seizure prophylaxis completed on 11/5  SLP    #Possible right brachial plexus injury  Ordered dexamethasone taper  EMG followup if not improving   Monitor    #RESP  #Incidental pulmonary nodule  Patient works in [employer]  High risk - per radiology, optional CT in 12 months    Respiratory therapy: RT performs O2 management prn, breathing retraining, pulmonary hygiene and bronchospasm management prn to  optimize participation in therapies.     ##CARDIAC  DVT prophylaxis: Continue lovenox 30mg BID. Encourage OOB. Monitor daily for signs and symptoms of DVT including but not limited to swelling and pain to prevent the development of DVT that may interfere with therapies.    ##GI  GI prophylaxis:  Continue prilosec 20mg daily to prevent gastritis/dyspepsia which may interfere with therapies.  #At risk of opioid induced constipation  Goal of one continent BM daily  Continue miralax 17g BID    #At high risk of malnutrition  Dietician monitors nutrient intake, recommend supplements prn and provide nutrition education to pt/family to promote optimal nutrition for wound healing/recovery.   Orders Placed This Encounter   Procedures    Diet Order Diet: Regular     Standing Status:   Standing     Number of Occurrences:   1     Order Specific Question:   Diet:     Answer:   Regular [1]   Continue multivitamin daily  prealbumin levels 28.5    #Elevated liver enzymes, improving  AST 98,   Monitor    ##HEME/ENDO  #Anemia  Monitor    #Vitamin D deficiency  Vit D 25 OH 12  Continue ergocalciferol 50,000 units weekly    ##SKIN  Skin/dermal ulcer prophylaxis: Monitor for new skin conditions with q.2 h. turns as required to prevent the development of skin breakdown.     Kandi Ramirez, DO  Physical Medicine and Rehabilitation

## 2022-11-10 NOTE — THERAPY
Physical Therapy   Daily Treatment     Patient Name: Nerissa Dennis  Age:  42 y.o., Sex:  female  Medical Record #: [id number]  Today's Date: 11/10/2022     Precautions  Precautions: Fall Risk, Posterior Hip Precautions, Weight Bearing As Tolerated Left Lower Extremity, Spinal / Back Precautions , Lumbosacral Orthosis  Comments: non-surgical, Clarence collar for showers, L ankle aircast    Subjective    Patient very pleasant and motivated to participate. Patient's , Tahir, present and supportive throughout session.      Objective       11/10/22 0831   PT Charge Group   PT Gait Training 1   PT Therapeutic Exercise 2   PT Therapeutic Activities 1   PT Total Time Spent   PT Individual Total Time Spent (Mins) 60   Gait Functional Level of Assist    Gait Level Of Assist Modified Independent   Assistive Device None   Distance (Feet) 250   # of Times Distance was Traveled 2   Deviation Antalgic;Bradykinetic   Stairs Functional Level of Assist   Level of Assist with Stairs Modified Independent   # of Stairs Climbed 12   Stairs Description   (step-to pattern; no rails)   Transfer Functional Level of Assist   Bed, Chair, Wheelchair Transfer Independent   Bed Chair Wheelchair Transfer Description   (no AD; log roll)   Sitting Lower Body Exercises   Hip Abduction 2 sets of 10;Bilateral;Light Resistance Theraband   Hip Adduction 2 sets of 10;Bilateral  (isometric ball squeeze)   Long Arc Quad 3 sets of 15;Bilateral  (5 second hold in extension; slow eccentric phase)   Standing Lower Body Exercises   Hip Extension 2 sets of 10  (with L LE)   Hip Abduction 2 sets of 10  (with L LE)   Comments Lateral stepping  6 x 8' in // bars for safety; Retro gait 6 x 8' in // bars for safety   Bed Mobility    Supine to Sit Independent   Sit to Supine Independent   Sit to Stand Independent   Rolling Independent   Interdisciplinary Plan of Care Collaboration   IDT Collaboration with  Family / Caregiver   Patient Position at End of Therapy  Seated;Edge of Bed;Tray Table within Reach;Call Light within Reach;Family / Friend in Room   Collaboration Comments Patient's  present throughout session and very supportive     Completed D/C IRF-[facility] this session except for car transfer (completed yesterday with primary therapist; patient required CGA for safety).     Continued education re: hip precautions and spinal precautions. Reviewed bed mobility (patient has tall mattress, recommended utilizing a small step stool to assist with scooting back on the bed in order to safety perform log roll).     Assessment    Patient demonstrates independence with all basic functional mobility with good safety awareness. She showed no LOB when negotiating stairs with a railing, with appropriate step-to pattern throughout. Patient's  very supportive and receptive to all education.     Strengths: Able to follow instructions, Effective communication skills, Good carryover of learning, Good insight into deficits/needs, Independent prior level of function, Motivated for self care and independence, Pleasant and cooperative, Supportive family, Willingly participates in therapeutic activities  Barriers: Decreased endurance, Fatigue, Home accessibility, Impaired activity tolerance, Limited mobility, Pain, Pain poorly managed    Plan    D/C home tomorrow 11/11, continued work on activity tolerance/endurance and balance

## 2022-11-10 NOTE — PROGRESS NOTES
Patient care assumed. Report received from [facility] IVORY Rodriguez. Patient is alert and calm, resting in bed. Call light and bedside table within reach. Will continue to monitor.    Please return call to patient [phone]. He is using [facility] pharmacy now.     Thanks,  Jameson Sweeney

## 2022-11-11 ENCOUNTER — PHARMACY VISIT (OUTPATIENT)
Dept: PHARMACY | Facility: MEDICAL CENTER | Age: 42
End: 2022-11-11
Payer: COMMERCIAL

## 2022-11-11 VITALS
WEIGHT: 154.32 LBS | TEMPERATURE: 97.9 F | SYSTOLIC BLOOD PRESSURE: 100 MMHG | OXYGEN SATURATION: 94 % | DIASTOLIC BLOOD PRESSURE: 65 MMHG | RESPIRATION RATE: 18 BRPM | HEART RATE: 70 BPM | BODY MASS INDEX: 27.34 KG/M2 | HEIGHT: 63 IN

## 2022-11-11 PROCEDURE — RXMED WILLOW AMBULATORY MEDICATION CHARGE: Performed by: PHYSICAL MEDICINE & REHABILITATION

## 2022-11-11 PROCEDURE — 700111 HCHG RX REV CODE 636 W/ 250 OVERRIDE (IP): Performed by: PHYSICAL MEDICINE & REHABILITATION

## 2022-11-11 PROCEDURE — 99239 HOSP IP/OBS DSCHRG MGMT >30: CPT | Performed by: PHYSICAL MEDICINE & REHABILITATION

## 2022-11-11 PROCEDURE — 700102 HCHG RX REV CODE 250 W/ 637 OVERRIDE(OP): Performed by: PHYSICAL MEDICINE & REHABILITATION

## 2022-11-11 PROCEDURE — A9270 NON-COVERED ITEM OR SERVICE: HCPCS | Performed by: PHYSICAL MEDICINE & REHABILITATION

## 2022-11-11 RX ADMIN — OMEPRAZOLE 20 MG: 20 CAPSULE, DELAYED RELEASE ORAL at 08:12

## 2022-11-11 RX ADMIN — OXYCODONE HYDROCHLORIDE 10 MG: 10 TABLET ORAL at 10:27

## 2022-11-11 RX ADMIN — DULOXETINE HYDROCHLORIDE 30 MG: 30 CAPSULE, DELAYED RELEASE ORAL at 08:12

## 2022-11-11 RX ADMIN — OXYCODONE HYDROCHLORIDE 10 MG: 10 TABLET ORAL at 05:34

## 2022-11-11 RX ADMIN — Medication 1 TABLET: at 10:27

## 2022-11-11 RX ADMIN — METAXALONE 800 MG: 800 TABLET ORAL at 08:12

## 2022-11-11 RX ADMIN — GABAPENTIN 900 MG: 300 CAPSULE ORAL at 05:34

## 2022-11-11 RX ADMIN — POLYETHYLENE GLYCOL 3350 1 PACKET: 17 POWDER, FOR SOLUTION ORAL at 08:12

## 2022-11-11 RX ADMIN — ACETAMINOPHEN 500 MG: 500 TABLET ORAL at 08:12

## 2022-11-11 ASSESSMENT — PATIENT HEALTH QUESTIONNAIRE - PHQ9
6. FEELING BAD ABOUT YOURSELF - OR THAT YOU ARE A FAILURE OR HAVE LET YOURSELF OR YOUR FAMILY DOWN: SEVERAL DAYS
2. FEELING DOWN, DEPRESSED OR HOPELESS: SEVERAL DAYS
9. THOUGHTS THAT YOU WOULD BE BETTER OFF DEAD, OR OF HURTING YOURSELF: NOT AT ALL
1. LITTLE INTEREST OR PLEASURE IN DOING THINGS: NOT AT ALL
4. FEELING TIRED OR HAVING LITTLE ENERGY: SEVERAL DAYS
SUM OF ALL RESPONSES TO PHQ9 QUESTIONS 1 & 2: 1
SUM OF ALL RESPONSES TO PHQ QUESTIONS 1-9: 5
8. MOVING OR SPEAKING SO SLOWLY THAT OTHER PEOPLE COULD HAVE NOTICED. OR THE OPPOSITE, BEING SO FIGETY OR RESTLESS THAT YOU HAVE BEEN MOVING AROUND A LOT MORE THAN USUAL: SEVERAL DAYS
3. TROUBLE FALLING OR STAYING ASLEEP OR SLEEPING TOO MUCH: SEVERAL DAYS
5. POOR APPETITE OR OVEREATING: NOT AT ALL
7. TROUBLE CONCENTRATING ON THINGS, SUCH AS READING THE NEWSPAPER OR WATCHING TELEVISION: NOT AT ALL

## 2022-11-11 NOTE — PROGRESS NOTES
Patient care assumed. Report received from [facility] IVORY Rodriguez. Patient is alert and calm, resting in bed. Call light and bedside table within reach. Will continue to monitor.
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
## 2022-11-11 NOTE — PROGRESS NOTES
Patient discharged to home per order.  Discharge instructions reviewed with patient and ; they verbalize understanding and signed copies placed in chart.  Patient has all belongings; signed copy of form in chart.  Patient left facility at 1049 via ambulating accompanied by rehab staff and .  Have enjoyed working with this pleasant patient.

## 2022-11-11 NOTE — THERAPY
Physical Therapy   Daily Treatment     Patient Name: Nerissa Dennis  Age:  42 y.o., Sex:  female  Medical Record #: [id number]  Today's Date: 11/10/2022     Precautions  Precautions: Fall Risk, Posterior Hip Precautions, Weight Bearing As Tolerated Left Lower Extremity, Spinal / Back Precautions , Lumbosacral Orthosis  Comments: non-surgical, [place] collar for showers, L ankle aircast    Subjective    Patient pleasant and motivated to participate. Requests to go outside and walk.      Objective       11/10/22 1531   PT Charge Group   PT Gait Training 1   PT Therapeutic Exercise 1   PT Total Time Spent   PT Individual Total Time Spent (Mins) 30   Gait Functional Level of Assist    Gait Level Of Assist Modified Independent   Assistive Device None   Distance (Feet) 500  (outside on varied terrain)   # of Times Distance was Traveled 2   Deviation Antalgic;Bradykinetic   Transfer Functional Level of Assist   Bed, Chair, Wheelchair Transfer Independent   Bed Chair Wheelchair Transfer Description   (no AD)   Bed Mobility    Sit to Stand Independent   Interdisciplinary Plan of Care Collaboration   IDT Collaboration with  Occupational Therapist   Patient Position at End of Therapy Seated;Edge of Bed;Tray Table within Reach;Phone within Reach;Call Light within Reach   Collaboration Comments hand-off of care from OT       Assessment    Patient demonstrates independence with ambulation over uneven ground but recommended to patient to have supervision from her  for safety initially. Patient very pleased with her results and feels ready to discharge home tomorrow.     Strengths: Able to follow instructions, Effective communication skills, Good carryover of learning, Good insight into deficits/needs, Independent prior level of function, Motivated for self care and independence, Pleasant and cooperative, Supportive family, Willingly participates in therapeutic activities  Barriers: Decreased endurance, Fatigue, Home  accessibility, Impaired activity tolerance, Limited mobility, Pain, Pain poorly managed    Plan    D/C home tomorrow 11/11 with  to [facility]

## 2022-11-11 NOTE — CARE PLAN
The patient is Stable - Low risk of patient condition declining or worsening    Shift Goals  Clinical Goals: safety  Patient Goals: pain management    Progress made toward(s) clinical / shift goals:      Problem: Pain - Standard  Goal: Alleviation of pain or a reduction in pain to the patient’s comfort goal  Outcome: Progressing  Note: Roxicodone 10mg given PRN per MAR for c/o neck and back pain with adequate relief. Pt sleeps good.Will continue to monitor.     Problem: VTE Prevention  Goal: Patient will remain free from venous thromboembolism (VTE)  Outcome: Progressing  Note: Pt refused her scheduled lovenox injection tonight. She transfers mod I on unit and ambulates in the hallways before bedtime tonight without any assistive device.Will continue to monitor.       Patient is not progressing towards the following goals:

## 2022-11-11 NOTE — DISCHARGE PLANNING
Cm  Pt to dc today.   Home w/ spouse in [place].   Workman's comp has made follow up appts and made arrangements for shower chair/ hip kit.   No further intervention.      Follow-up Information:     Josias Schreiber M.D.  [address]  [facility] [address]  [phone]     Follow up on 11/15/2022  Tuesday @ 1:15pm     Ministerio Watts M.D.  [address]  [address]  [facility] [id number] [phone]     Follow up on 11/16/2022 Wednesday @ 11:15am     [facility]  [address], [address]  [facility] [id number] [phone]  Follow up  Please follow up for hand therapy.     [facility]  [address] [place] [address]  [phone]  Follow up  Provider for out patient therapy.  Please call to schedule follow up appointment.     [facility]  [address] [facility] [address]  [phone]  Follow up  Provider for shower chiar.     [facility]  [address] [facility] [address]  [facility] [address]  [phone]  Follow up  Out Patient Physiatry (Rehabilitation Medicine)  Workman's Comp will make follow up appointment for you.

## 2023-02-02 ENCOUNTER — APPOINTMENT (OUTPATIENT)
Dept: RADIOLOGY | Facility: MEDICAL CENTER | Age: 43
End: 2023-02-02
Attending: PHYSICAL MEDICINE & REHABILITATION
Payer: COMMERCIAL

## 2023-02-02 DIAGNOSIS — S14.3XXA INJURY OF BRACHIAL PLEXUS, INITIAL ENCOUNTER: ICD-10-CM

## 2023-02-02 PROCEDURE — 71550 MRI CHEST W/O DYE: CPT
